# Patient Record
Sex: MALE | Race: OTHER | NOT HISPANIC OR LATINO | URBAN - METROPOLITAN AREA
[De-identification: names, ages, dates, MRNs, and addresses within clinical notes are randomized per-mention and may not be internally consistent; named-entity substitution may affect disease eponyms.]

---

## 2017-08-23 ENCOUNTER — EMERGENCY (EMERGENCY)
Facility: HOSPITAL | Age: 32
LOS: 1 days | Discharge: HOME | End: 2017-08-23
Attending: EMERGENCY MEDICINE
Payer: MEDICAID

## 2017-08-23 VITALS
HEART RATE: 80 BPM | HEIGHT: 68 IN | TEMPERATURE: 99 F | OXYGEN SATURATION: 99 % | RESPIRATION RATE: 18 BRPM | WEIGHT: 184.97 LBS | SYSTOLIC BLOOD PRESSURE: 113 MMHG | DIASTOLIC BLOOD PRESSURE: 69 MMHG

## 2017-08-23 LAB — ETHANOL SERPL-MCNC: 88 MG/DL — SIGNIFICANT CHANGE UP

## 2017-08-23 PROCEDURE — 80307 DRUG TEST PRSMV CHEM ANLYZR: CPT

## 2017-08-23 PROCEDURE — 73130 X-RAY EXAM OF HAND: CPT | Mod: 26,50

## 2017-08-23 PROCEDURE — 99283 EMERGENCY DEPT VISIT LOW MDM: CPT

## 2017-08-23 PROCEDURE — 99285 EMERGENCY DEPT VISIT HI MDM: CPT | Mod: 25

## 2017-08-23 PROCEDURE — 36415 COLL VENOUS BLD VENIPUNCTURE: CPT

## 2017-08-23 PROCEDURE — 73130 X-RAY EXAM OF HAND: CPT

## 2017-08-23 NOTE — SBIRT NOTE. - NSSBIRTSERVICES_GEN_A_ED_FT
Provided SBIRT services: Full screen positive. Referral to Treatment Performed. Screening results were reviewed with the patient and patient was provided information about healthy guidelines and potential negative consequences associated with level of risk. Motivation and readiness to reduce or stop use was discussed and goals and activities to make changes were suggested/offered.  Referral for complete assessment and level of care determination at a certified treatment facility was completed by contacting the treatment facility via phone, and add apt info as noted below:  Appt confirmed at: [facility]- 8/25/17 9:30am  Audit Score: 16  DAST Score: 3  Duration = 60 Minutes

## 2017-08-23 NOTE — ED PROVIDER NOTE - PROGRESS NOTE DETAILS
pt is walking and talking and understanding normally and is safe for discharge at this time will rec f/u pmd and d/c as per sign out and reeval
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
## 2018-02-01 ENCOUNTER — OUTPATIENT (OUTPATIENT)
Dept: OUTPATIENT SERVICES | Facility: HOSPITAL | Age: 33
LOS: 1 days | End: 2018-02-01
Payer: MEDICAID

## 2018-02-01 PROCEDURE — G9001: CPT

## 2018-02-10 ENCOUNTER — EMERGENCY (EMERGENCY)
Facility: HOSPITAL | Age: 33
LOS: 1 days | Discharge: HOME | End: 2018-02-10
Attending: EMERGENCY MEDICINE
Payer: MEDICAID

## 2018-02-10 VITALS
HEART RATE: 86 BPM | HEIGHT: 69 IN | DIASTOLIC BLOOD PRESSURE: 68 MMHG | RESPIRATION RATE: 22 BRPM | SYSTOLIC BLOOD PRESSURE: 113 MMHG | WEIGHT: 210.1 LBS | OXYGEN SATURATION: 98 % | TEMPERATURE: 98 F

## 2018-02-10 VITALS
OXYGEN SATURATION: 100 % | HEART RATE: 88 BPM | SYSTOLIC BLOOD PRESSURE: 134 MMHG | TEMPERATURE: 99 F | RESPIRATION RATE: 20 BRPM | DIASTOLIC BLOOD PRESSURE: 88 MMHG

## 2018-02-10 DIAGNOSIS — F10.10 ALCOHOL ABUSE, UNCOMPLICATED: ICD-10-CM

## 2018-02-10 DIAGNOSIS — F43.10 POST-TRAUMATIC STRESS DISORDER, UNSPECIFIED: ICD-10-CM

## 2018-02-10 DIAGNOSIS — F12.10 CANNABIS ABUSE, UNCOMPLICATED: ICD-10-CM

## 2018-02-10 DIAGNOSIS — F31.70 BIPOLAR DISORDER, CURRENTLY IN REMISSION, MOST RECENT EPISODE UNSPECIFIED: ICD-10-CM

## 2018-02-10 LAB
ALBUMIN SERPL ELPH-MCNC: 4.6 G/DL — SIGNIFICANT CHANGE UP (ref 3.3–5.2)
ALP SERPL-CCNC: 78 U/L — SIGNIFICANT CHANGE UP (ref 40–120)
ALT FLD-CCNC: 22 U/L — SIGNIFICANT CHANGE UP
ANION GAP SERPL CALC-SCNC: 18 MMOL/L — HIGH (ref 5–17)
AST SERPL-CCNC: 23 U/L — SIGNIFICANT CHANGE UP
BASOPHILS # BLD AUTO: 0 K/UL — SIGNIFICANT CHANGE UP (ref 0–0.2)
BASOPHILS NFR BLD AUTO: 0.2 % — SIGNIFICANT CHANGE UP (ref 0–2)
BILIRUB SERPL-MCNC: 0.5 MG/DL — SIGNIFICANT CHANGE UP (ref 0.4–2)
BUN SERPL-MCNC: 12 MG/DL — SIGNIFICANT CHANGE UP (ref 8–20)
CALCIUM SERPL-MCNC: 8.9 MG/DL — SIGNIFICANT CHANGE UP (ref 8.6–10.2)
CHLORIDE SERPL-SCNC: 105 MMOL/L — SIGNIFICANT CHANGE UP (ref 98–107)
CO2 SERPL-SCNC: 21 MMOL/L — LOW (ref 22–29)
CREAT SERPL-MCNC: 0.73 MG/DL — SIGNIFICANT CHANGE UP (ref 0.5–1.3)
EOSINOPHIL # BLD AUTO: 0.1 K/UL — SIGNIFICANT CHANGE UP (ref 0–0.5)
EOSINOPHIL NFR BLD AUTO: 1.6 % — SIGNIFICANT CHANGE UP (ref 0–5)
ETHANOL SERPL-MCNC: 245 MG/DL — SIGNIFICANT CHANGE UP
GLUCOSE SERPL-MCNC: 122 MG/DL — HIGH (ref 70–115)
HCT VFR BLD CALC: 44.8 % — SIGNIFICANT CHANGE UP (ref 42–52)
HGB BLD-MCNC: 15.8 G/DL — SIGNIFICANT CHANGE UP (ref 14–18)
LYMPHOCYTES # BLD AUTO: 3.1 K/UL — SIGNIFICANT CHANGE UP (ref 1–4.8)
LYMPHOCYTES # BLD AUTO: 51.1 % — SIGNIFICANT CHANGE UP (ref 20–55)
MCHC RBC-ENTMCNC: 29.3 PG — SIGNIFICANT CHANGE UP (ref 27–31)
MCHC RBC-ENTMCNC: 35.3 G/DL — SIGNIFICANT CHANGE UP (ref 32–36)
MCV RBC AUTO: 83 FL — SIGNIFICANT CHANGE UP (ref 80–94)
MONOCYTES # BLD AUTO: 0.3 K/UL — SIGNIFICANT CHANGE UP (ref 0–0.8)
MONOCYTES NFR BLD AUTO: 5.2 % — SIGNIFICANT CHANGE UP (ref 3–10)
NEUTROPHILS # BLD AUTO: 2.5 K/UL — SIGNIFICANT CHANGE UP (ref 1.8–8)
NEUTROPHILS NFR BLD AUTO: 41.7 % — SIGNIFICANT CHANGE UP (ref 37–73)
PLATELET # BLD AUTO: 252 K/UL — SIGNIFICANT CHANGE UP (ref 150–400)
POTASSIUM SERPL-MCNC: 3.8 MMOL/L — SIGNIFICANT CHANGE UP (ref 3.5–5.3)
POTASSIUM SERPL-SCNC: 3.8 MMOL/L — SIGNIFICANT CHANGE UP (ref 3.5–5.3)
PROT SERPL-MCNC: 7.7 G/DL — SIGNIFICANT CHANGE UP (ref 6.6–8.7)
RBC # BLD: 5.4 M/UL — SIGNIFICANT CHANGE UP (ref 4.6–6.2)
RBC # FLD: 12.9 % — SIGNIFICANT CHANGE UP (ref 11–15.6)
SODIUM SERPL-SCNC: 144 MMOL/L — SIGNIFICANT CHANGE UP (ref 135–145)
WBC # BLD: 6.1 K/UL — SIGNIFICANT CHANGE UP (ref 4.8–10.8)
WBC # FLD AUTO: 6.1 K/UL — SIGNIFICANT CHANGE UP (ref 4.8–10.8)

## 2018-02-10 PROCEDURE — 96375 TX/PRO/DX INJ NEW DRUG ADDON: CPT | Mod: XU

## 2018-02-10 PROCEDURE — 70486 CT MAXILLOFACIAL W/O DYE: CPT

## 2018-02-10 PROCEDURE — 99285 EMERGENCY DEPT VISIT HI MDM: CPT | Mod: 25

## 2018-02-10 PROCEDURE — 85027 COMPLETE CBC AUTOMATED: CPT

## 2018-02-10 PROCEDURE — 96374 THER/PROPH/DIAG INJ IV PUSH: CPT | Mod: XU

## 2018-02-10 PROCEDURE — 80307 DRUG TEST PRSMV CHEM ANLYZR: CPT

## 2018-02-10 PROCEDURE — 80053 COMPREHEN METABOLIC PANEL: CPT

## 2018-02-10 PROCEDURE — 70486 CT MAXILLOFACIAL W/O DYE: CPT | Mod: 26

## 2018-02-10 PROCEDURE — 12011 RPR F/E/E/N/L/M 2.5 CM/<: CPT

## 2018-02-10 PROCEDURE — 70450 CT HEAD/BRAIN W/O DYE: CPT | Mod: 26

## 2018-02-10 PROCEDURE — 72125 CT NECK SPINE W/O DYE: CPT

## 2018-02-10 PROCEDURE — 72125 CT NECK SPINE W/O DYE: CPT | Mod: 26

## 2018-02-10 PROCEDURE — 36415 COLL VENOUS BLD VENIPUNCTURE: CPT

## 2018-02-10 PROCEDURE — 90792 PSYCH DIAG EVAL W/MED SRVCS: CPT

## 2018-02-10 PROCEDURE — 70450 CT HEAD/BRAIN W/O DYE: CPT

## 2018-02-10 RX ORDER — CEPHALEXIN 500 MG
1 CAPSULE ORAL
Qty: 14 | Refills: 0 | OUTPATIENT
Start: 2018-02-10 | End: 2018-02-16

## 2018-02-10 RX ORDER — CEFAZOLIN SODIUM 1 G
1000 VIAL (EA) INJECTION ONCE
Qty: 0 | Refills: 0 | Status: COMPLETED | OUTPATIENT
Start: 2018-02-10 | End: 2018-02-10

## 2018-02-10 RX ORDER — SODIUM CHLORIDE 9 MG/ML
3 INJECTION INTRAMUSCULAR; INTRAVENOUS; SUBCUTANEOUS ONCE
Qty: 0 | Refills: 0 | Status: COMPLETED | OUTPATIENT
Start: 2018-02-10 | End: 2018-02-10

## 2018-02-10 RX ORDER — HALOPERIDOL DECANOATE 100 MG/ML
5 INJECTION INTRAMUSCULAR ONCE
Qty: 0 | Refills: 0 | Status: COMPLETED | OUTPATIENT
Start: 2018-02-10 | End: 2018-02-10

## 2018-02-10 RX ORDER — DIPHENHYDRAMINE HCL 50 MG
25 CAPSULE ORAL ONCE
Qty: 0 | Refills: 0 | Status: COMPLETED | OUTPATIENT
Start: 2018-02-10 | End: 2018-02-10

## 2018-02-10 RX ADMIN — HALOPERIDOL DECANOATE 5 MILLIGRAM(S): 100 INJECTION INTRAMUSCULAR at 03:15

## 2018-02-10 RX ADMIN — Medication 2 MILLIGRAM(S): at 03:15

## 2018-02-10 RX ADMIN — Medication 25 MILLIGRAM(S): at 03:15

## 2018-02-10 RX ADMIN — Medication 100 MILLIGRAM(S): at 06:26

## 2018-02-10 RX ADMIN — SODIUM CHLORIDE 3 MILLILITER(S): 9 INJECTION INTRAMUSCULAR; INTRAVENOUS; SUBCUTANEOUS at 03:15

## 2018-02-10 NOTE — ED ADULT NURSE REASSESSMENT NOTE - NS ED NURSE REASSESS COMMENT FT1
Assumed care of patient at this time, patient is resting in bed at this time, patient denies any pain. NAD noted. Patient is calm and cooperative. Will continue to monitor. Patient given warm blankets
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
## 2018-02-10 NOTE — ED PROVIDER NOTE - PROGRESS NOTE DETAILS
Lac repaired. Pt reports up to date tetanus. Lac repaired. Pt reports up to date tetanus. Pt now clinically sober;  laceration repaired by me at patient's request.  Pt given suture care instructions and I reviewed patient's results with patient and allowed the opportunity for questions.  I provided the patient with anticipatory guidance, advised followup with PCP, and gave return precautions. Patient reported that they were feeling well for discharge and expressed understanding of instructions.   PSych called for clearance. Pt cleared by psychiatry. Denies SI/ HI. Pending SW for appointment with [facility].
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
## 2018-02-10 NOTE — ED BEHAVIORAL HEALTH ASSESSMENT NOTE - RISK ASSESSMENT
Pt has suicidal and legal hx, he is using substances, acts impulsively and recklessly, but has no intent or plan to harm self and others. he is future oriented and wants to get help.  PT is not at imminent risk to harm self  and others, His long term risk is high and he needs treatment in community.

## 2018-02-10 NOTE — ED BEHAVIORAL HEALTH ASSESSMENT NOTE - SUICIDE RISK FACTORS
Highly impulsive behavior/Substance abuse/dependence/Access to means (pills, firearms, etc.)/History of abuse/trauma/Mood episode

## 2018-02-14 DIAGNOSIS — R69 ILLNESS, UNSPECIFIED: ICD-10-CM
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
## 2019-02-24 ENCOUNTER — EMERGENCY (EMERGENCY)
Facility: HOSPITAL | Age: 34
LOS: 1 days | Discharge: HOME | End: 2019-02-24
Attending: STUDENT IN AN ORGANIZED HEALTH CARE EDUCATION/TRAINING PROGRAM
Payer: MEDICAID

## 2019-02-24 VITALS
RESPIRATION RATE: 18 BRPM | TEMPERATURE: 98 F | HEART RATE: 78 BPM | SYSTOLIC BLOOD PRESSURE: 130 MMHG | DIASTOLIC BLOOD PRESSURE: 72 MMHG | OXYGEN SATURATION: 97 %

## 2019-02-24 LAB
ALBUMIN SERPL ELPH-MCNC: 4.4 G/DL — SIGNIFICANT CHANGE UP (ref 3.3–5.2)
ALP SERPL-CCNC: 61 U/L — SIGNIFICANT CHANGE UP (ref 40–120)
ALT FLD-CCNC: 20 U/L — SIGNIFICANT CHANGE UP
ANION GAP SERPL CALC-SCNC: 13 MMOL/L — SIGNIFICANT CHANGE UP (ref 5–17)
AST SERPL-CCNC: 19 U/L — SIGNIFICANT CHANGE UP
BASOPHILS # BLD AUTO: 0 K/UL — SIGNIFICANT CHANGE UP (ref 0–0.2)
BASOPHILS NFR BLD AUTO: 0.2 % — SIGNIFICANT CHANGE UP (ref 0–2)
BILIRUB SERPL-MCNC: 0.3 MG/DL — LOW (ref 0.4–2)
BUN SERPL-MCNC: 13 MG/DL — SIGNIFICANT CHANGE UP (ref 8–20)
CALCIUM SERPL-MCNC: 8.9 MG/DL — SIGNIFICANT CHANGE UP (ref 8.6–10.2)
CHLORIDE SERPL-SCNC: 110 MMOL/L — HIGH (ref 98–107)
CO2 SERPL-SCNC: 21 MMOL/L — LOW (ref 22–29)
CREAT SERPL-MCNC: 0.63 MG/DL — SIGNIFICANT CHANGE UP (ref 0.5–1.3)
EOSINOPHIL # BLD AUTO: 0.1 K/UL — SIGNIFICANT CHANGE UP (ref 0–0.5)
EOSINOPHIL NFR BLD AUTO: 2 % — SIGNIFICANT CHANGE UP (ref 0–5)
ETHANOL SERPL-MCNC: 229 MG/DL — SIGNIFICANT CHANGE UP
GLUCOSE SERPL-MCNC: 102 MG/DL — SIGNIFICANT CHANGE UP (ref 70–115)
HCT VFR BLD CALC: 41.1 % — LOW (ref 42–52)
HGB BLD-MCNC: 14.3 G/DL — SIGNIFICANT CHANGE UP (ref 14–18)
LYMPHOCYTES # BLD AUTO: 1.6 K/UL — SIGNIFICANT CHANGE UP (ref 1–4.8)
LYMPHOCYTES # BLD AUTO: 31.6 % — SIGNIFICANT CHANGE UP (ref 20–55)
MCHC RBC-ENTMCNC: 29.4 PG — SIGNIFICANT CHANGE UP (ref 27–31)
MCHC RBC-ENTMCNC: 34.8 G/DL — SIGNIFICANT CHANGE UP (ref 32–36)
MCV RBC AUTO: 84.6 FL — SIGNIFICANT CHANGE UP (ref 80–94)
MONOCYTES # BLD AUTO: 0.2 K/UL — SIGNIFICANT CHANGE UP (ref 0–0.8)
MONOCYTES NFR BLD AUTO: 3.3 % — SIGNIFICANT CHANGE UP (ref 3–10)
NEUTROPHILS # BLD AUTO: 3.1 K/UL — SIGNIFICANT CHANGE UP (ref 1.8–8)
NEUTROPHILS NFR BLD AUTO: 62.7 % — SIGNIFICANT CHANGE UP (ref 37–73)
PLATELET # BLD AUTO: 225 K/UL — SIGNIFICANT CHANGE UP (ref 150–400)
POTASSIUM SERPL-MCNC: 3.7 MMOL/L — SIGNIFICANT CHANGE UP (ref 3.5–5.3)
POTASSIUM SERPL-SCNC: 3.7 MMOL/L — SIGNIFICANT CHANGE UP (ref 3.5–5.3)
PROT SERPL-MCNC: 7.3 G/DL — SIGNIFICANT CHANGE UP (ref 6.6–8.7)
RBC # BLD: 4.86 M/UL — SIGNIFICANT CHANGE UP (ref 4.6–6.2)
RBC # FLD: 12.8 % — SIGNIFICANT CHANGE UP (ref 11–15.6)
SODIUM SERPL-SCNC: 144 MMOL/L — SIGNIFICANT CHANGE UP (ref 135–145)
WBC # BLD: 4.9 K/UL — SIGNIFICANT CHANGE UP (ref 4.8–10.8)
WBC # FLD AUTO: 4.9 K/UL — SIGNIFICANT CHANGE UP (ref 4.8–10.8)

## 2019-02-24 PROCEDURE — 99284 EMERGENCY DEPT VISIT MOD MDM: CPT

## 2019-02-24 PROCEDURE — 36415 COLL VENOUS BLD VENIPUNCTURE: CPT

## 2019-02-24 PROCEDURE — 99285 EMERGENCY DEPT VISIT HI MDM: CPT | Mod: 25

## 2019-02-24 PROCEDURE — 80053 COMPREHEN METABOLIC PANEL: CPT

## 2019-02-24 PROCEDURE — 96372 THER/PROPH/DIAG INJ SC/IM: CPT

## 2019-02-24 PROCEDURE — 85027 COMPLETE CBC AUTOMATED: CPT

## 2019-02-24 PROCEDURE — 80307 DRUG TEST PRSMV CHEM ANLYZR: CPT

## 2019-02-24 RX ADMIN — Medication 2 MILLIGRAM(S): at 19:49

## 2019-02-24 NOTE — ED ADULT NURSE NOTE - NSIMPLEMENTINTERV_GEN_ALL_ED
Implemented All Fall with Harm Risk Interventions:  [place] to call system. Call bell, personal items and telephone within reach. Instruct patient to call for assistance. Room bathroom lighting operational. Non-slip footwear when patient is off stretcher. Physically safe environment: no spills, clutter or unnecessary equipment. Stretcher in lowest position, wheels locked, appropriate side rails in place. Provide visual cue, wrist band, yellow gown, etc. Monitor gait and stability. Monitor for mental status changes and reorient to person, place, and time. Review medications for side effects contributing to fall risk. Reinforce activity limits and safety measures with patient and family. Provide visual clues: red socks.

## 2019-02-24 NOTE — ED PROVIDER NOTE - CLINICAL SUMMARY MEDICAL DECISION MAKING FREE TEXT BOX
Pt brought in for alcohol intoxication and agitation.  will check labs and give ativan and watch until sober. Pt brought in for alcohol intoxication and agitation.  will check labs and give ativan and watch until sober.  Pt clinically sober and ambulating in ED without difficulty and acting appropriately. will d/c to home with instructions to f/up with pcp in 1-2 days. instructed to return for any new/concerning symptoms.
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
## 2019-02-24 NOTE — ED PROVIDER NOTE - PROGRESS NOTE DETAILS
labs reviewed. Pt states that he still feels foggy from the ativan. Pt signed out to DR. Palma pending sobriety and discharge.
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
## 2019-02-25 VITALS
RESPIRATION RATE: 19 BRPM | DIASTOLIC BLOOD PRESSURE: 66 MMHG | SYSTOLIC BLOOD PRESSURE: 135 MMHG | HEART RATE: 74 BPM | OXYGEN SATURATION: 98 % | TEMPERATURE: 98 F

## 2019-02-25 PROBLEM — F41.9 ANXIETY DISORDER, UNSPECIFIED: Chronic | Status: ACTIVE | Noted: 2017-08-23

## 2019-02-25 PROBLEM — F43.10 POST-TRAUMATIC STRESS DISORDER, UNSPECIFIED: Chronic | Status: ACTIVE | Noted: 2017-08-23

## 2019-03-01 ENCOUNTER — OUTPATIENT (OUTPATIENT)
Dept: OUTPATIENT SERVICES | Facility: HOSPITAL | Age: 34
LOS: 1 days | End: 2019-03-01
Payer: MEDICAID

## 2019-03-01 PROCEDURE — G9001: CPT

## 2019-03-04 DIAGNOSIS — Z71.89 OTHER SPECIFIED COUNSELING: ICD-10-CM

## 2019-03-06 ENCOUNTER — INPATIENT (INPATIENT)
Facility: HOSPITAL | Age: 34
LOS: 5 days | Discharge: PSYCHIATRIC OR SUBSTANCE USE FACILITY | DRG: 580 | End: 2019-03-12
Attending: SURGERY | Admitting: SURGERY
Payer: MEDICAID

## 2019-03-06 VITALS
HEART RATE: 96 BPM | SYSTOLIC BLOOD PRESSURE: 119 MMHG | RESPIRATION RATE: 17 BRPM | OXYGEN SATURATION: 99 % | DIASTOLIC BLOOD PRESSURE: 89 MMHG

## 2019-03-06 DIAGNOSIS — S11.91XA LACERATION WITHOUT FOREIGN BODY OF UNSPECIFIED PART OF NECK, INITIAL ENCOUNTER: ICD-10-CM

## 2019-03-06 LAB
ALBUMIN SERPL ELPH-MCNC: 4.9 G/DL — SIGNIFICANT CHANGE UP (ref 3.3–5.2)
ALP SERPL-CCNC: 82 U/L — SIGNIFICANT CHANGE UP (ref 40–120)
ALT FLD-CCNC: 33 U/L — SIGNIFICANT CHANGE UP
AMPHET UR-MCNC: NEGATIVE — SIGNIFICANT CHANGE UP
ANION GAP SERPL CALC-SCNC: 15 MMOL/L — SIGNIFICANT CHANGE UP (ref 5–17)
APTT BLD: 31.4 SEC — SIGNIFICANT CHANGE UP (ref 27.5–36.3)
AST SERPL-CCNC: 42 U/L — HIGH
BARBITURATES UR SCN-MCNC: NEGATIVE — SIGNIFICANT CHANGE UP
BASE EXCESS BLDV CALC-SCNC: 0.7 MMOL/L — SIGNIFICANT CHANGE UP (ref -2–2)
BASOPHILS # BLD AUTO: 0 K/UL — SIGNIFICANT CHANGE UP (ref 0–0.2)
BASOPHILS NFR BLD AUTO: 0.2 % — SIGNIFICANT CHANGE UP (ref 0–2)
BENZODIAZ UR-MCNC: NEGATIVE — SIGNIFICANT CHANGE UP
BILIRUB SERPL-MCNC: 0.4 MG/DL — SIGNIFICANT CHANGE UP (ref 0.4–2)
BLD GP AB SCN SERPL QL: SIGNIFICANT CHANGE UP
BUN SERPL-MCNC: 13 MG/DL — SIGNIFICANT CHANGE UP (ref 8–20)
CA-I SERPL-SCNC: 1.06 MMOL/L — LOW (ref 1.15–1.33)
CALCIUM SERPL-MCNC: 8.9 MG/DL — SIGNIFICANT CHANGE UP (ref 8.6–10.2)
CHLORIDE BLDV-SCNC: 110 MMOL/L — HIGH (ref 98–107)
CHLORIDE SERPL-SCNC: 106 MMOL/L — SIGNIFICANT CHANGE UP (ref 98–107)
CO2 SERPL-SCNC: 23 MMOL/L — SIGNIFICANT CHANGE UP (ref 22–29)
COCAINE METAB.OTHER UR-MCNC: NEGATIVE — SIGNIFICANT CHANGE UP
CREAT SERPL-MCNC: 0.78 MG/DL — SIGNIFICANT CHANGE UP (ref 0.5–1.3)
EOSINOPHIL # BLD AUTO: 0.2 K/UL — SIGNIFICANT CHANGE UP (ref 0–0.5)
EOSINOPHIL NFR BLD AUTO: 2.7 % — SIGNIFICANT CHANGE UP (ref 0–5)
ETHANOL SERPL-MCNC: 348 MG/DL — SIGNIFICANT CHANGE UP
GAS PNL BLDV: 149 MMOL/L — HIGH (ref 135–145)
GAS PNL BLDV: SIGNIFICANT CHANGE UP
GAS PNL BLDV: SIGNIFICANT CHANGE UP
GLUCOSE BLDV-MCNC: 128 MG/DL — HIGH (ref 70–99)
GLUCOSE SERPL-MCNC: 137 MG/DL — HIGH (ref 70–115)
HCO3 BLDV-SCNC: 25 MMOL/L — SIGNIFICANT CHANGE UP (ref 20–26)
HCT VFR BLD CALC: 44.2 % — SIGNIFICANT CHANGE UP (ref 42–52)
HCT VFR BLDA CALC: 49 — SIGNIFICANT CHANGE UP (ref 39–50)
HGB BLD CALC-MCNC: 16.1 G/DL — SIGNIFICANT CHANGE UP (ref 13–17)
HGB BLD-MCNC: 15.6 G/DL — SIGNIFICANT CHANGE UP (ref 14–18)
INR BLD: 0.96 RATIO — SIGNIFICANT CHANGE UP (ref 0.88–1.16)
LACTATE BLDV-MCNC: 2.3 MMOL/L — HIGH (ref 0.5–2)
LIDOCAIN IGE QN: 85 U/L — HIGH (ref 22–51)
LYMPHOCYTES # BLD AUTO: 2.3 K/UL — SIGNIFICANT CHANGE UP (ref 1–4.8)
LYMPHOCYTES # BLD AUTO: 38.8 % — SIGNIFICANT CHANGE UP (ref 20–55)
MCHC RBC-ENTMCNC: 29.9 PG — SIGNIFICANT CHANGE UP (ref 27–31)
MCHC RBC-ENTMCNC: 35.3 G/DL — SIGNIFICANT CHANGE UP (ref 32–36)
MCV RBC AUTO: 84.7 FL — SIGNIFICANT CHANGE UP (ref 80–94)
METHADONE UR-MCNC: NEGATIVE — SIGNIFICANT CHANGE UP
MONOCYTES # BLD AUTO: 0.3 K/UL — SIGNIFICANT CHANGE UP (ref 0–0.8)
MONOCYTES NFR BLD AUTO: 4.4 % — SIGNIFICANT CHANGE UP (ref 3–10)
NEUTROPHILS # BLD AUTO: 3.1 K/UL — SIGNIFICANT CHANGE UP (ref 1.8–8)
NEUTROPHILS NFR BLD AUTO: 53.4 % — SIGNIFICANT CHANGE UP (ref 37–73)
OPIATES UR-MCNC: NEGATIVE — SIGNIFICANT CHANGE UP
OTHER CELLS CSF MANUAL: 21 ML/DL — SIGNIFICANT CHANGE UP (ref 18–22)
PCO2 BLDV: 46 MMHG — SIGNIFICANT CHANGE UP (ref 35–50)
PCP SPEC-MCNC: SIGNIFICANT CHANGE UP
PCP UR-MCNC: NEGATIVE — SIGNIFICANT CHANGE UP
PH BLDV: 7.37 — SIGNIFICANT CHANGE UP (ref 7.32–7.43)
PLATELET # BLD AUTO: 245 K/UL — SIGNIFICANT CHANGE UP (ref 150–400)
PO2 BLDV: 84 MMHG — HIGH (ref 25–45)
POTASSIUM BLDV-SCNC: 3.3 MMOL/L — LOW (ref 3.4–4.5)
POTASSIUM SERPL-MCNC: 3.6 MMOL/L — SIGNIFICANT CHANGE UP (ref 3.5–5.3)
POTASSIUM SERPL-SCNC: 3.6 MMOL/L — SIGNIFICANT CHANGE UP (ref 3.5–5.3)
PROT SERPL-MCNC: 7.5 G/DL — SIGNIFICANT CHANGE UP (ref 6.6–8.7)
PROTHROM AB SERPL-ACNC: 11 SEC — SIGNIFICANT CHANGE UP (ref 10–12.9)
RBC # BLD: 5.22 M/UL — SIGNIFICANT CHANGE UP (ref 4.6–6.2)
RBC # FLD: 13.1 % — SIGNIFICANT CHANGE UP (ref 11–15.6)
SAO2 % BLDV: 95 % — SIGNIFICANT CHANGE UP
SODIUM SERPL-SCNC: 144 MMOL/L — SIGNIFICANT CHANGE UP (ref 135–145)
THC UR QL: POSITIVE
WBC # BLD: 5.9 K/UL — SIGNIFICANT CHANGE UP (ref 4.8–10.8)
WBC # FLD AUTO: 5.9 K/UL — SIGNIFICANT CHANGE UP (ref 4.8–10.8)

## 2019-03-06 PROCEDURE — 70450 CT HEAD/BRAIN W/O DYE: CPT | Mod: 26

## 2019-03-06 PROCEDURE — 99291 CRITICAL CARE FIRST HOUR: CPT

## 2019-03-06 PROCEDURE — 70498 CT ANGIOGRAPHY NECK: CPT | Mod: 26

## 2019-03-06 PROCEDURE — 71045 X-RAY EXAM CHEST 1 VIEW: CPT | Mod: 26

## 2019-03-06 RX ORDER — CHLORHEXIDINE GLUCONATE 213 G/1000ML
1 SOLUTION TOPICAL DAILY
Qty: 0 | Refills: 0 | Status: DISCONTINUED | OUTPATIENT
Start: 2019-03-06 | End: 2019-03-08

## 2019-03-06 RX ORDER — FENTANYL CITRATE 50 UG/ML
100 INJECTION INTRAVENOUS ONCE
Qty: 0 | Refills: 0 | Status: DISCONTINUED | OUTPATIENT
Start: 2019-03-06 | End: 2019-03-06

## 2019-03-06 RX ORDER — TETANUS TOXOID, REDUCED DIPHTHERIA TOXOID AND ACELLULAR PERTUSSIS VACCINE, ADSORBED 5; 2.5; 8; 8; 2.5 [IU]/.5ML; [IU]/.5ML; UG/.5ML; UG/.5ML; UG/.5ML
0.5 SUSPENSION INTRAMUSCULAR ONCE
Qty: 0 | Refills: 0 | Status: COMPLETED | OUTPATIENT
Start: 2019-03-06 | End: 2019-03-06

## 2019-03-06 RX ORDER — SODIUM CHLORIDE 9 MG/ML
1000 INJECTION, SOLUTION INTRAVENOUS
Qty: 0 | Refills: 0 | Status: DISCONTINUED | OUTPATIENT
Start: 2019-03-06 | End: 2019-03-07

## 2019-03-06 RX ORDER — CHLORHEXIDINE GLUCONATE 213 G/1000ML
15 SOLUTION TOPICAL
Qty: 0 | Refills: 0 | Status: DISCONTINUED | OUTPATIENT
Start: 2019-03-06 | End: 2019-03-07

## 2019-03-06 RX ORDER — HYDROMORPHONE HYDROCHLORIDE 2 MG/ML
1 INJECTION INTRAMUSCULAR; INTRAVENOUS; SUBCUTANEOUS ONCE
Qty: 0 | Refills: 0 | Status: DISCONTINUED | OUTPATIENT
Start: 2019-03-06 | End: 2019-03-06

## 2019-03-06 RX ORDER — HYDROMORPHONE HYDROCHLORIDE 2 MG/ML
2 INJECTION INTRAMUSCULAR; INTRAVENOUS; SUBCUTANEOUS ONCE
Qty: 0 | Refills: 0 | Status: DISCONTINUED | OUTPATIENT
Start: 2019-03-06 | End: 2019-03-06

## 2019-03-06 RX ORDER — TETANUS AND DIPHTHERIA TOXOIDS ADSORBED 2; 2 [LF]/.5ML; [LF]/.5ML
0.5 INJECTION INTRAMUSCULAR ONCE
Qty: 0 | Refills: 0 | Status: DISCONTINUED | OUTPATIENT
Start: 2019-03-06 | End: 2019-03-06

## 2019-03-06 RX ORDER — ETOMIDATE 2 MG/ML
20 INJECTION INTRAVENOUS ONCE
Qty: 0 | Refills: 0 | Status: COMPLETED | OUTPATIENT
Start: 2019-03-06 | End: 2019-03-06

## 2019-03-06 RX ORDER — ACETAMINOPHEN 500 MG
650 TABLET ORAL EVERY 6 HOURS
Qty: 0 | Refills: 0 | Status: DISCONTINUED | OUTPATIENT
Start: 2019-03-06 | End: 2019-03-12

## 2019-03-06 RX ORDER — SUCCINYLCHOLINE CHLORIDE 100 MG/5ML
100 SYRINGE (ML) INTRAVENOUS ONCE
Qty: 0 | Refills: 0 | Status: COMPLETED | OUTPATIENT
Start: 2019-03-06 | End: 2019-03-06

## 2019-03-06 RX ORDER — HYDROMORPHONE HYDROCHLORIDE 2 MG/ML
1 INJECTION INTRAMUSCULAR; INTRAVENOUS; SUBCUTANEOUS
Qty: 0 | Refills: 0 | Status: DISCONTINUED | OUTPATIENT
Start: 2019-03-06 | End: 2019-03-08

## 2019-03-06 RX ORDER — DEXAMETHASONE 0.5 MG/5ML
10 ELIXIR ORAL ONCE
Qty: 0 | Refills: 0 | Status: COMPLETED | OUTPATIENT
Start: 2019-03-06 | End: 2019-03-06

## 2019-03-06 RX ORDER — SODIUM CHLORIDE 9 MG/ML
1000 INJECTION, SOLUTION INTRAVENOUS ONCE
Qty: 0 | Refills: 0 | Status: COMPLETED | OUTPATIENT
Start: 2019-03-06 | End: 2019-03-06

## 2019-03-06 RX ORDER — SODIUM CHLORIDE 9 MG/ML
1000 INJECTION INTRAMUSCULAR; INTRAVENOUS; SUBCUTANEOUS ONCE
Qty: 0 | Refills: 0 | Status: COMPLETED | OUTPATIENT
Start: 2019-03-06 | End: 2019-03-06

## 2019-03-06 RX ORDER — THIAMINE MONONITRATE (VIT B1) 100 MG
500 TABLET ORAL DAILY
Qty: 0 | Refills: 0 | Status: DISCONTINUED | OUTPATIENT
Start: 2019-03-06 | End: 2019-03-08

## 2019-03-06 RX ORDER — PROPOFOL 10 MG/ML
20 INJECTION, EMULSION INTRAVENOUS
Qty: 1000 | Refills: 0 | Status: DISCONTINUED | OUTPATIENT
Start: 2019-03-06 | End: 2019-03-07

## 2019-03-06 RX ORDER — PANTOPRAZOLE SODIUM 20 MG/1
40 TABLET, DELAYED RELEASE ORAL DAILY
Qty: 0 | Refills: 0 | Status: DISCONTINUED | OUTPATIENT
Start: 2019-03-06 | End: 2019-03-10

## 2019-03-06 RX ORDER — ENOXAPARIN SODIUM 100 MG/ML
40 INJECTION SUBCUTANEOUS DAILY
Qty: 0 | Refills: 0 | Status: DISCONTINUED | OUTPATIENT
Start: 2019-03-07 | End: 2019-03-12

## 2019-03-06 RX ORDER — PROPOFOL 10 MG/ML
20 INJECTION, EMULSION INTRAVENOUS ONCE
Qty: 0 | Refills: 0 | Status: COMPLETED | OUTPATIENT
Start: 2019-03-06 | End: 2019-03-06

## 2019-03-06 RX ORDER — FOLIC ACID 0.8 MG
1 TABLET ORAL DAILY
Qty: 0 | Refills: 0 | Status: DISCONTINUED | OUTPATIENT
Start: 2019-03-06 | End: 2019-03-12

## 2019-03-06 RX ORDER — PROPOFOL 10 MG/ML
30 INJECTION, EMULSION INTRAVENOUS
Qty: 1000 | Refills: 0 | Status: DISCONTINUED | OUTPATIENT
Start: 2019-03-06 | End: 2019-03-06

## 2019-03-06 RX ADMIN — Medication 105 MILLIGRAM(S): at 18:40

## 2019-03-06 RX ADMIN — HYDROMORPHONE HYDROCHLORIDE 1 MILLIGRAM(S): 2 INJECTION INTRAMUSCULAR; INTRAVENOUS; SUBCUTANEOUS at 20:51

## 2019-03-06 RX ADMIN — SODIUM CHLORIDE 125 MILLILITER(S): 9 INJECTION, SOLUTION INTRAVENOUS at 18:40

## 2019-03-06 RX ADMIN — HYDROMORPHONE HYDROCHLORIDE 1 MILLIGRAM(S): 2 INJECTION INTRAMUSCULAR; INTRAVENOUS; SUBCUTANEOUS at 21:12

## 2019-03-06 RX ADMIN — PROPOFOL 10.8 MICROGRAM(S)/KG/MIN: 10 INJECTION, EMULSION INTRAVENOUS at 14:00

## 2019-03-06 RX ADMIN — FENTANYL CITRATE 100 MICROGRAM(S): 50 INJECTION INTRAVENOUS at 17:17

## 2019-03-06 RX ADMIN — SODIUM CHLORIDE 1000 MILLILITER(S): 9 INJECTION, SOLUTION INTRAVENOUS at 21:03

## 2019-03-06 RX ADMIN — TETANUS TOXOID, REDUCED DIPHTHERIA TOXOID AND ACELLULAR PERTUSSIS VACCINE, ADSORBED 0.5 MILLILITER(S): 5; 2.5; 8; 8; 2.5 SUSPENSION INTRAMUSCULAR at 16:10

## 2019-03-06 RX ADMIN — PROPOFOL 16.2 MICROGRAM(S)/KG/MIN: 10 INJECTION, EMULSION INTRAVENOUS at 12:00

## 2019-03-06 RX ADMIN — Medication 1 MILLIGRAM(S): at 16:59

## 2019-03-06 RX ADMIN — Medication 2 MILLIGRAM(S): at 17:30

## 2019-03-06 RX ADMIN — Medication 2 MILLIGRAM(S): at 22:49

## 2019-03-06 RX ADMIN — CHLORHEXIDINE GLUCONATE 15 MILLILITER(S): 213 SOLUTION TOPICAL at 17:04

## 2019-03-06 RX ADMIN — PROPOFOL 10.8 MICROGRAM(S)/KG/MIN: 10 INJECTION, EMULSION INTRAVENOUS at 16:24

## 2019-03-06 RX ADMIN — FENTANYL CITRATE 100 MICROGRAM(S): 50 INJECTION INTRAVENOUS at 17:02

## 2019-03-06 RX ADMIN — PANTOPRAZOLE SODIUM 40 MILLIGRAM(S): 20 TABLET, DELAYED RELEASE ORAL at 16:59

## 2019-03-06 RX ADMIN — HYDROMORPHONE HYDROCHLORIDE 2 MILLIGRAM(S): 2 INJECTION INTRAMUSCULAR; INTRAVENOUS; SUBCUTANEOUS at 12:41

## 2019-03-06 RX ADMIN — Medication 102 MILLIGRAM(S): at 20:25

## 2019-03-06 RX ADMIN — Medication 2 MILLIGRAM(S): at 19:47

## 2019-03-06 RX ADMIN — HYDROMORPHONE HYDROCHLORIDE 2 MILLIGRAM(S): 2 INJECTION INTRAMUSCULAR; INTRAVENOUS; SUBCUTANEOUS at 13:00

## 2019-03-06 RX ADMIN — CHLORHEXIDINE GLUCONATE 1 APPLICATION(S): 213 SOLUTION TOPICAL at 14:54

## 2019-03-06 RX ADMIN — PROPOFOL 10.8 MICROGRAM(S)/KG/MIN: 10 INJECTION, EMULSION INTRAVENOUS at 21:21

## 2019-03-06 RX ADMIN — SODIUM CHLORIDE 4000 MILLILITER(S): 9 INJECTION INTRAMUSCULAR; INTRAVENOUS; SUBCUTANEOUS at 12:00
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
## 2019-03-06 NOTE — PATIENT PROFILE ADULT - OVER THE PAST TWO WEEKS HAVE YOU FELT DOWN, DEPRESSED OR HOPELESS?
mother states patient fears people in general and does not trust anyone due to his pysch history ... mother stated that patient has been battling with depression for years since childhood/yes

## 2019-03-06 NOTE — ED PROVIDER NOTE - PHYSICAL EXAMINATION
Airway intact, slurring speech/hoarse voice, not answering questions appropriately   BS b/l   equal pulses all 4 extremities   GCS 14   moving all extremities, combative   +12cm laceration to left side of neck no active bleeding, no hematoma, no tracheal deviation, no bubbling, violates platysma   no trauma to chest/abdomen/back   no spinal step offs   no other wounds noted
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
## 2019-03-06 NOTE — H&P ADULT - NSHPREVIEWOFSYSTEMS_GEN_ALL_CORE
Unable to obtain. Patient combative and uncooperative.    He does deny taking any medications. Denies illicits.
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
## 2019-03-06 NOTE — H&P ADULT - NSHPLABSRESULTS_GEN_ALL_CORE
LABS:                        15.6   5.9   )-----------( 245      ( 06 Mar 2019 10:59 )             44.2     03-06    144  |  106  |  13.0  ----------------------------<  137<H>  3.6   |  23.0  |  0.78    Ca    8.9      06 Mar 2019 10:59    TPro  7.5  /  Alb  4.9  /  TBili  0.4  /  DBili  x   /  AST  42<H>  /  ALT  33  /  AlkPhos  82  03-06    PT/INR - ( 06 Mar 2019 10:59 )   PT: 11.0 sec;   INR: 0.96 ratio         PTT - ( 06 Mar 2019 10:59 )  PTT:31.4 sec

## 2019-03-06 NOTE — H&P ADULT - NSHPPHYSICALEXAM_GEN_ALL_CORE
INTERVAL HPI/OVERNIGHT EVENTS:    SUBJECTIVE:      MEDICATIONS  (STANDING):  etomidate Injectable 20 milliGRAM(s) IV Push Once  propofol Infusion 30 MICROgram(s)/kG/Min (16.2 mL/Hr) IV Continuous <Continuous>  propofol Injectable 20 milliGRAM(s) IV Push once  sodium chloride 0.9% Bolus 1000 milliLiter(s) IV Bolus once  succinylcholine Injectable 100 milliGRAM(s) IV Push Once    MEDICATIONS  (PRN):      Vital Signs Last 24 Hrs  T(C): --  T(F): --  HR: --  BP: --  BP(mean): --  RR: --  SpO2: --    PE  Gen: combative. intoxicated. intubated in trauma bay.   Pulm: CTAB. Symmetrical chest movements. No injuries to chest noted  CV: RRR nontachy.  Abd: Soft NTND. Pelvis stable  : Normal male ext genitalia. No blood on meatus  Ext: WWP all extremities. Superficial abrasions to both ant legs and L 2nd knuckle  Vasc: WWP 2+ pulses femoral and bilateral radial/DP/PTs  Neuro: Combative, belligerent. But nonfocal exam. He moves all 4x extremities on command. No facial droop. CN grossly intact. PERRLA Pupils 2-3mm equally bilaterally reactive. EOMI
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
## 2019-03-06 NOTE — ED PROVIDER NOTE - CLINICAL SUMMARY MEDICAL DECISION MAKING FREE TEXT BOX
patient with large laceration to neck zone 2/3, no hard signs, questionable change in speech related to wound vs intoxication. patient agitative and combative. intubated for medical management and possible tracheal injury. intubated without difficulty, no pooling/swelling/deviation in airway. patient sedated and paralyzed, to CTA and SICU

## 2019-03-07 DIAGNOSIS — F31.30 BIPOLAR DISORDER, CURRENT EPISODE DEPRESSED, MILD OR MODERATE SEVERITY, UNSPECIFIED: ICD-10-CM

## 2019-03-07 DIAGNOSIS — F10.231 ALCOHOL DEPENDENCE WITH WITHDRAWAL DELIRIUM: ICD-10-CM

## 2019-03-07 DIAGNOSIS — S11.91XA LACERATION WITHOUT FOREIGN BODY OF UNSPECIFIED PART OF NECK, INITIAL ENCOUNTER: ICD-10-CM
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
## 2019-03-07 DIAGNOSIS — F10.10 ALCOHOL ABUSE, UNCOMPLICATED: ICD-10-CM

## 2019-03-07 LAB
ABO RH CONFIRMATION: SIGNIFICANT CHANGE UP
ANION GAP SERPL CALC-SCNC: 14 MMOL/L — SIGNIFICANT CHANGE UP (ref 5–17)
BASOPHILS # BLD AUTO: 0 K/UL — SIGNIFICANT CHANGE UP (ref 0–0.2)
BASOPHILS NFR BLD AUTO: 0.1 % — SIGNIFICANT CHANGE UP (ref 0–2)
BUN SERPL-MCNC: 13 MG/DL — SIGNIFICANT CHANGE UP (ref 8–20)
CALCIUM SERPL-MCNC: 7.6 MG/DL — LOW (ref 8.6–10.2)
CHLORIDE SERPL-SCNC: 103 MMOL/L — SIGNIFICANT CHANGE UP (ref 98–107)
CO2 SERPL-SCNC: 25 MMOL/L — SIGNIFICANT CHANGE UP (ref 22–29)
CREAT SERPL-MCNC: 0.63 MG/DL — SIGNIFICANT CHANGE UP (ref 0.5–1.3)
EOSINOPHIL # BLD AUTO: 0 K/UL — SIGNIFICANT CHANGE UP (ref 0–0.5)
EOSINOPHIL NFR BLD AUTO: 1 % — SIGNIFICANT CHANGE UP (ref 0–6)
GAS PNL BLDA: SIGNIFICANT CHANGE UP
GLUCOSE SERPL-MCNC: 140 MG/DL — HIGH (ref 70–115)
HCT VFR BLD CALC: 38.8 % — LOW (ref 42–52)
HGB BLD-MCNC: 13.2 G/DL — LOW (ref 14–18)
LYMPHOCYTES # BLD AUTO: 0.6 K/UL — LOW (ref 1–4.8)
LYMPHOCYTES # BLD AUTO: 3 % — LOW (ref 20–55)
MAGNESIUM SERPL-MCNC: 2 MG/DL — SIGNIFICANT CHANGE UP (ref 1.6–2.6)
MCHC RBC-ENTMCNC: 29.1 PG — SIGNIFICANT CHANGE UP (ref 27–31)
MCHC RBC-ENTMCNC: 34 G/DL — SIGNIFICANT CHANGE UP (ref 32–36)
MCV RBC AUTO: 85.7 FL — SIGNIFICANT CHANGE UP (ref 80–94)
MONOCYTES # BLD AUTO: 0.1 K/UL — SIGNIFICANT CHANGE UP (ref 0–0.8)
MONOCYTES NFR BLD AUTO: 2 % — LOW (ref 3–10)
NEUTROPHILS # BLD AUTO: 7.8 K/UL — SIGNIFICANT CHANGE UP (ref 1.8–8)
NEUTROPHILS NFR BLD AUTO: 93 % — HIGH (ref 37–73)
NEUTS BAND # BLD: 1 % — SIGNIFICANT CHANGE UP (ref 0–8)
PHOSPHATE SERPL-MCNC: 2.4 MG/DL — SIGNIFICANT CHANGE UP (ref 2.4–4.7)
PLAT MORPH BLD: NORMAL — SIGNIFICANT CHANGE UP
PLATELET # BLD AUTO: 221 K/UL — SIGNIFICANT CHANGE UP (ref 150–400)
POTASSIUM SERPL-MCNC: 4 MMOL/L — SIGNIFICANT CHANGE UP (ref 3.5–5.3)
POTASSIUM SERPL-SCNC: 4 MMOL/L — SIGNIFICANT CHANGE UP (ref 3.5–5.3)
RBC # BLD: 4.53 M/UL — LOW (ref 4.6–6.2)
RBC # FLD: 13.5 % — SIGNIFICANT CHANGE UP (ref 11–15.6)
RBC BLD AUTO: NORMAL — SIGNIFICANT CHANGE UP
SODIUM SERPL-SCNC: 142 MMOL/L — SIGNIFICANT CHANGE UP (ref 135–145)
WBC # BLD: 8.5 K/UL — SIGNIFICANT CHANGE UP (ref 4.8–10.8)
WBC # FLD AUTO: 8.5 K/UL — SIGNIFICANT CHANGE UP (ref 4.8–10.8)

## 2019-03-07 PROCEDURE — 99232 SBSQ HOSP IP/OBS MODERATE 35: CPT

## 2019-03-07 PROCEDURE — 99222 1ST HOSP IP/OBS MODERATE 55: CPT

## 2019-03-07 RX ORDER — DEXMEDETOMIDINE HYDROCHLORIDE IN 0.9% SODIUM CHLORIDE 4 UG/ML
0.3 INJECTION INTRAVENOUS
Qty: 200 | Refills: 0 | Status: DISCONTINUED | OUTPATIENT
Start: 2019-03-07 | End: 2019-03-08

## 2019-03-07 RX ORDER — SODIUM CHLORIDE 9 MG/ML
2000 INJECTION, SOLUTION INTRAVENOUS ONCE
Qty: 0 | Refills: 0 | Status: COMPLETED | OUTPATIENT
Start: 2019-03-07 | End: 2019-03-07

## 2019-03-07 RX ADMIN — Medication 2 MILLIGRAM(S): at 05:03

## 2019-03-07 RX ADMIN — DEXMEDETOMIDINE HYDROCHLORIDE IN 0.9% SODIUM CHLORIDE 6.16 MICROGRAM(S)/KG/HR: 4 INJECTION INTRAVENOUS at 11:26

## 2019-03-07 RX ADMIN — Medication 1 PATCH: at 11:26

## 2019-03-07 RX ADMIN — CHLORHEXIDINE GLUCONATE 1 APPLICATION(S): 213 SOLUTION TOPICAL at 11:27

## 2019-03-07 RX ADMIN — Medication 2 MILLIGRAM(S): at 08:50

## 2019-03-07 RX ADMIN — Medication 62.5 MILLIMOLE(S): at 05:58

## 2019-03-07 RX ADMIN — HYDROMORPHONE HYDROCHLORIDE 1 MILLIGRAM(S): 2 INJECTION INTRAMUSCULAR; INTRAVENOUS; SUBCUTANEOUS at 15:15

## 2019-03-07 RX ADMIN — SODIUM CHLORIDE 2000 MILLILITER(S): 9 INJECTION, SOLUTION INTRAVENOUS at 03:35

## 2019-03-07 RX ADMIN — Medication 2 MILLIGRAM(S): at 12:00

## 2019-03-07 RX ADMIN — Medication 1 TABLET(S): at 11:27

## 2019-03-07 RX ADMIN — HYDROMORPHONE HYDROCHLORIDE 1 MILLIGRAM(S): 2 INJECTION INTRAMUSCULAR; INTRAVENOUS; SUBCUTANEOUS at 14:56

## 2019-03-07 RX ADMIN — SODIUM CHLORIDE 125 MILLILITER(S): 9 INJECTION, SOLUTION INTRAVENOUS at 04:18

## 2019-03-07 RX ADMIN — Medication 105 MILLIGRAM(S): at 11:34

## 2019-03-07 RX ADMIN — HYDROMORPHONE HYDROCHLORIDE 1 MILLIGRAM(S): 2 INJECTION INTRAMUSCULAR; INTRAVENOUS; SUBCUTANEOUS at 03:47

## 2019-03-07 RX ADMIN — PANTOPRAZOLE SODIUM 40 MILLIGRAM(S): 20 TABLET, DELAYED RELEASE ORAL at 11:27

## 2019-03-07 RX ADMIN — PROPOFOL 10.8 MICROGRAM(S)/KG/MIN: 10 INJECTION, EMULSION INTRAVENOUS at 04:17

## 2019-03-07 RX ADMIN — HYDROMORPHONE HYDROCHLORIDE 1 MILLIGRAM(S): 2 INJECTION INTRAMUSCULAR; INTRAVENOUS; SUBCUTANEOUS at 00:24

## 2019-03-07 RX ADMIN — Medication 1 PATCH: at 18:03

## 2019-03-07 RX ADMIN — ENOXAPARIN SODIUM 40 MILLIGRAM(S): 100 INJECTION SUBCUTANEOUS at 11:26

## 2019-03-07 RX ADMIN — Medication 1 MILLIGRAM(S): at 11:27

## 2019-03-07 RX ADMIN — HYDROMORPHONE HYDROCHLORIDE 1 MILLIGRAM(S): 2 INJECTION INTRAMUSCULAR; INTRAVENOUS; SUBCUTANEOUS at 00:17

## 2019-03-07 RX ADMIN — HYDROMORPHONE HYDROCHLORIDE 1 MILLIGRAM(S): 2 INJECTION INTRAMUSCULAR; INTRAVENOUS; SUBCUTANEOUS at 03:31

## 2019-03-07 RX ADMIN — Medication 4 MILLIGRAM(S): at 06:38

## 2019-03-07 RX ADMIN — DEXMEDETOMIDINE HYDROCHLORIDE IN 0.9% SODIUM CHLORIDE 6.16 MICROGRAM(S)/KG/HR: 4 INJECTION INTRAVENOUS at 16:30
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
## 2019-03-07 NOTE — BEHAVIORAL HEALTH ASSESSMENT NOTE - NSBHCHARTREVIEWLAB_PSY_A_CORE FT
13.2   8.5   )-----------( 221      ( 07 Mar 2019 03:59 )             38.8     03-07  142  |  103  |  13.0  ----------------------------<  140<H>  4.0   |  25.0  |  0.63    Ca    7.6<L>      07 Mar 2019 03:59  Phos  2.4     03-07  Mg     2.0     03-07    TPro  7.5  /  Alb  4.9  /  TBili  0.4  /  DBili  x   /  AST  42<H>  /  ALT  33  /  AlkPhos  82  03-06    LIVER FUNCTIONS - ( 06 Mar 2019 10:59 )  Alb: 4.9 g/dL / Pro: 7.5 g/dL / ALK PHOS: 82 U/L / ALT: 33 U/L / AST: 42 U/L / GGT: x           PT/INR - ( 06 Mar 2019 10:59 )   PT: 11.0 sec;   INR: 0.96 ratio       PTT - ( 06 Mar 2019 10:59 )  PTT:31.4 sec

## 2019-03-07 NOTE — BEHAVIORAL HEALTH ASSESSMENT NOTE - HOMICIDALITY / AGGRESSION (CURRENT/PAST)
Unable to assess Pt has advanced OA on X-ray.  They have failed all forms of conservative treatment including PT and Meds and Injections as necessary.  They are her for Total Joint Replacement.
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
## 2019-03-07 NOTE — BEHAVIORAL HEALTH ASSESSMENT NOTE - NSBHCHARTREVIEWVS_PSY_A_CORE FT
T(C): 36.9 (07 Mar 2019 12:00), Max: 37.7 (06 Mar 2019 20:48)  T(F): 98.4 (07 Mar 2019 12:00), Max: 99.9 (06 Mar 2019 20:48)  HR: 93 (07 Mar 2019 12:00) (64 - 120)  BP: 151/75 (07 Mar 2019 12:00) (94/51 - 151/75)  BP(mean): 91 (07 Mar 2019 12:00) (67 - 99)  RR: 34 (07 Mar 2019 12:00) (10 - 34)  SpO2: 99% (07 Mar 2019 12:00) (94% - 100%)
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
## 2019-03-07 NOTE — BEHAVIORAL HEALTH ASSESSMENT NOTE - NSBHCONSULTMEDS_PSY_A_CORE FT
clonidine Patch 0.1 mG/24Hr(s) 1 patch Transdermal every 7 days  dexmedetomidine Infusion 0.3 MICROgram(s)/kG/Hr (6.157 mL/Hr) IV Continuous <Continuous>

## 2019-03-07 NOTE — CHART NOTE - NSCHARTNOTEFT_GEN_A_CORE
Pt self extubated at 06:00 while on propofol and receiving Dilaudid and Ativan IV pushes for agitation.  Pt remains with hoarse voice as reported prior to intubation but without stridor and without respiratory distress.  PT tolerating secretions.  1:1 constant observation ordered for possible suicide attempt.  Placed on CIWA to monitor for withdrawal.  Propofol changed to precedex.  Dr. Hernandez notified.
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
## 2019-03-07 NOTE — BEHAVIORAL HEALTH ASSESSMENT NOTE - ACCESS TO FIREARM
----- Message from Fozia Esparza sent at 1/10/2017  9:09 AM CST -----  Regarding: Past Thyroid Ultrasound For Comparison  Contact: [phone]    [facility]: [facility] of [address] [address]           Type/Exam: Ultrasound/US Thyroid           Pt. Name: FOZIA ESPARZA Sex: F : 1970      MRN: [id number] Account#: [id number]      Ordering Provider: HARDY GEORGE      Exam Date and Time: 11/12/15 1255 Location: US           Result -           PROCEDURE: US THYROID           COMPARISON: None.           INDICATIONS: SOFT TISSUE MASS IN THROAT, ENLARGED THYROID           TECHNIQUE: High-resolution ultrasound examination of the thyroid gland was      performed.           FINDINGS:      RIGHT LOBE: Right lobe measures 5.0 centimeters along its long axis. There      is a complex 8 millimeter x 4 millimeter upper pole cystic structure as well as      a hypoechoic complex cystic structure along the inferior aspect measuring 0.8 x      0.7 x 0.5 centimeters. There is some scattered smaller cystic foci within the      mid aspect of the right lobe of the thyroid.      LEFT LOBE: There is a dominant mostly solid heterogeneous focus which is      seen within the superior aspect of the left lobe of the thyroid measuring 2.1 x      1.0 centimeters. There is some other tiny cystic foci she there is truly      inferiorly.      ISTHMUS: 0.6 centimeter      OTHER: None.           CONCLUSION: Dominant solid abnormality within the superior aspect of the      left lobe of the thyroid. Recommend fine-needle aspiration to further evaluate.                     Dictated by: Clement Leo M.D. on 2015 at 13:59      Electronically Signed By: Clement Leo M.D. on 2015 at 14:01      Thank you,    Fozia Esparza  
Message given to Luis  
Unable to assess

## 2019-03-08 LAB
ANION GAP SERPL CALC-SCNC: 12 MMOL/L — SIGNIFICANT CHANGE UP (ref 5–17)
BASOPHILS # BLD AUTO: 0 K/UL — SIGNIFICANT CHANGE UP (ref 0–0.2)
BASOPHILS NFR BLD AUTO: 0.2 % — SIGNIFICANT CHANGE UP (ref 0–2)
BUN SERPL-MCNC: 9 MG/DL — SIGNIFICANT CHANGE UP (ref 8–20)
CALCIUM SERPL-MCNC: 8.1 MG/DL — LOW (ref 8.6–10.2)
CHLORIDE SERPL-SCNC: 107 MMOL/L — SIGNIFICANT CHANGE UP (ref 98–107)
CO2 SERPL-SCNC: 25 MMOL/L — SIGNIFICANT CHANGE UP (ref 22–29)
CREAT SERPL-MCNC: 0.63 MG/DL — SIGNIFICANT CHANGE UP (ref 0.5–1.3)
EOSINOPHIL # BLD AUTO: 0 K/UL — SIGNIFICANT CHANGE UP (ref 0–0.5)
EOSINOPHIL NFR BLD AUTO: 0.2 % — SIGNIFICANT CHANGE UP (ref 0–5)
GLUCOSE SERPL-MCNC: 126 MG/DL — HIGH (ref 70–115)
HCT VFR BLD CALC: 35.7 % — LOW (ref 42–52)
HGB BLD-MCNC: 12.3 G/DL — LOW (ref 14–18)
LYMPHOCYTES # BLD AUTO: 1.9 K/UL — SIGNIFICANT CHANGE UP (ref 1–4.8)
LYMPHOCYTES # BLD AUTO: 30.1 % — SIGNIFICANT CHANGE UP (ref 20–55)
MAGNESIUM SERPL-MCNC: 2 MG/DL — SIGNIFICANT CHANGE UP (ref 1.6–2.6)
MCHC RBC-ENTMCNC: 29.6 PG — SIGNIFICANT CHANGE UP (ref 27–31)
MCHC RBC-ENTMCNC: 34.5 G/DL — SIGNIFICANT CHANGE UP (ref 32–36)
MCV RBC AUTO: 85.8 FL — SIGNIFICANT CHANGE UP (ref 80–94)
MONOCYTES # BLD AUTO: 0.5 K/UL — SIGNIFICANT CHANGE UP (ref 0–0.8)
MONOCYTES NFR BLD AUTO: 7.5 % — SIGNIFICANT CHANGE UP (ref 3–10)
NEUTROPHILS # BLD AUTO: 3.9 K/UL — SIGNIFICANT CHANGE UP (ref 1.8–8)
NEUTROPHILS NFR BLD AUTO: 61.8 % — SIGNIFICANT CHANGE UP (ref 37–73)
PHOSPHATE SERPL-MCNC: 1.8 MG/DL — LOW (ref 2.4–4.7)
PLATELET # BLD AUTO: 180 K/UL — SIGNIFICANT CHANGE UP (ref 150–400)
POTASSIUM SERPL-MCNC: 3.5 MMOL/L — SIGNIFICANT CHANGE UP (ref 3.5–5.3)
POTASSIUM SERPL-SCNC: 3.5 MMOL/L — SIGNIFICANT CHANGE UP (ref 3.5–5.3)
RBC # BLD: 4.16 M/UL — LOW (ref 4.6–6.2)
RBC # FLD: 13.1 % — SIGNIFICANT CHANGE UP (ref 11–15.6)
SODIUM SERPL-SCNC: 144 MMOL/L — SIGNIFICANT CHANGE UP (ref 135–145)
WBC # BLD: 6.3 K/UL — SIGNIFICANT CHANGE UP (ref 4.8–10.8)
WBC # FLD AUTO: 6.3 K/UL — SIGNIFICANT CHANGE UP (ref 4.8–10.8)

## 2019-03-08 RX ORDER — OXYCODONE HYDROCHLORIDE 5 MG/1
5 TABLET ORAL EVERY 4 HOURS
Qty: 0 | Refills: 0 | Status: DISCONTINUED | OUTPATIENT
Start: 2019-03-08 | End: 2019-03-12

## 2019-03-08 RX ORDER — POTASSIUM CHLORIDE 20 MEQ
40 PACKET (EA) ORAL ONCE
Qty: 0 | Refills: 0 | Status: COMPLETED | OUTPATIENT
Start: 2019-03-08 | End: 2019-03-08

## 2019-03-08 RX ORDER — POTASSIUM PHOSPHATE, MONOBASIC POTASSIUM PHOSPHATE, DIBASIC 236; 224 MG/ML; MG/ML
30 INJECTION, SOLUTION INTRAVENOUS ONCE
Qty: 0 | Refills: 0 | Status: COMPLETED | OUTPATIENT
Start: 2019-03-08 | End: 2019-03-08

## 2019-03-08 RX ORDER — THIAMINE MONONITRATE (VIT B1) 100 MG
100 TABLET ORAL DAILY
Qty: 0 | Refills: 0 | Status: DISCONTINUED | OUTPATIENT
Start: 2019-03-09 | End: 2019-03-10

## 2019-03-08 RX ADMIN — Medication 1 TABLET(S): at 11:30

## 2019-03-08 RX ADMIN — Medication 1 PATCH: at 13:41

## 2019-03-08 RX ADMIN — Medication 40 MILLIEQUIVALENT(S): at 07:10

## 2019-03-08 RX ADMIN — PANTOPRAZOLE SODIUM 40 MILLIGRAM(S): 20 TABLET, DELAYED RELEASE ORAL at 11:30

## 2019-03-08 RX ADMIN — Medication 1 MILLIGRAM(S): at 11:30

## 2019-03-08 RX ADMIN — Medication 650 MILLIGRAM(S): at 10:05

## 2019-03-08 RX ADMIN — ENOXAPARIN SODIUM 40 MILLIGRAM(S): 100 INJECTION SUBCUTANEOUS at 11:30

## 2019-03-08 RX ADMIN — HYDROMORPHONE HYDROCHLORIDE 1 MILLIGRAM(S): 2 INJECTION INTRAMUSCULAR; INTRAVENOUS; SUBCUTANEOUS at 10:32

## 2019-03-08 RX ADMIN — Medication 1 PATCH: at 07:10

## 2019-03-08 RX ADMIN — HYDROMORPHONE HYDROCHLORIDE 1 MILLIGRAM(S): 2 INJECTION INTRAMUSCULAR; INTRAVENOUS; SUBCUTANEOUS at 10:06

## 2019-03-08 RX ADMIN — Medication 105 MILLIGRAM(S): at 12:08

## 2019-03-08 RX ADMIN — POTASSIUM PHOSPHATE, MONOBASIC POTASSIUM PHOSPHATE, DIBASIC 83.33 MILLIMOLE(S): 236; 224 INJECTION, SOLUTION INTRAVENOUS at 07:10

## 2019-03-08 RX ADMIN — Medication 650 MILLIGRAM(S): at 10:32
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
## 2019-03-09 RX ORDER — POTASSIUM CHLORIDE 20 MEQ
40 PACKET (EA) ORAL ONCE
Qty: 0 | Refills: 0 | Status: COMPLETED | OUTPATIENT
Start: 2019-03-09 | End: 2019-03-09

## 2019-03-09 RX ADMIN — ENOXAPARIN SODIUM 40 MILLIGRAM(S): 100 INJECTION SUBCUTANEOUS at 13:38

## 2019-03-09 RX ADMIN — PANTOPRAZOLE SODIUM 40 MILLIGRAM(S): 20 TABLET, DELAYED RELEASE ORAL at 13:39

## 2019-03-09 RX ADMIN — Medication 1 MILLIGRAM(S): at 13:38

## 2019-03-09 RX ADMIN — Medication 85 MILLIMOLE(S): at 13:37

## 2019-03-09 RX ADMIN — Medication 100 MILLIGRAM(S): at 13:38

## 2019-03-09 RX ADMIN — Medication 40 MILLIEQUIVALENT(S): at 13:39

## 2019-03-09 RX ADMIN — Medication 1 TABLET(S): at 13:38

## 2019-03-09 NOTE — PROGRESS NOTE ADULT - SUBJECTIVE AND OBJECTIVE BOX
Acute Care Surgery/Trauma Surgery Progress Note:  Patient seen s/p downgrade from SICU. No acute events reported. Patient afebrile, VSS. Pain well controlled. Tolerating diet. Denies n/v/f/c/cp/sob. Remains on 1:1.     Diet, Regular (03-07-19 @ 09:46)      Scheduled Medications:   enoxaparin Injectable 40 milliGRAM(s) SubCutaneous daily  folic acid 1 milliGRAM(s) Oral daily  multivitamin 1 Tablet(s) Oral daily  pantoprazole  Injectable 40 milliGRAM(s) IV Push daily  thiamine Injectable 100 milliGRAM(s) IV Push daily    PRN Medications:  acetaminophen    Suspension .. 650 milliGRAM(s) Oral every 6 hours PRN Mild Pain (1 - 3)  oxyCODONE    IR 5 milliGRAM(s) Oral every 4 hours PRN Moderate Pain (4 - 6)      Objective:   T(F): 98.9 (03-09 @ 00:00), Max: 99.5 (03-08 @ 04:00)  HR: 54 (03-09 @ 00:00) (54 - 94)  BP: 105/61 (03-09 @ 00:00) (105/61 - 137/84)  BP(mean): 79 (03-08 @ 14:00) (79 - 105)  ABP: --  ABP(mean): --  RR: 18 (03-09 @ 00:00) (15 - 25)  SpO2: 100% (03-09 @ 00:00) (95% - 100%)      Physical Exam:   GEN: patient resting comfortably in bed, in no acute distress  RESP: respirations are unlabored, no accessory muscle use, no conversational dyspnea  CVS: RRR  GI: Abdomen soft, non-tender, non-distended, no rebound tenderness / guarding    I&O's    03-07 @ 07:01  -  03-08 @ 07:00  --------------------------------------------------------  IN:    dexmedetomidine Infusion: 133.6 mL    multiple electrolytes Injection Type 1multiple electrolytes Injection Type 1: 1000 mL    Oral Fluid: 780 mL    Solution: 270.8 mL    Solution: 100 mL  Total IN: 2284.4 mL    OUT:    Incontinent per Condom Catheter: 6100 mL    Indwelling Catheter - Urethral: 410 mL    Voided: 2000 mL  Total OUT: 8510 mL    Total NET: -6225.6 mL      03-08 @ 07:01  -  03-09 @ 02:58  --------------------------------------------------------  IN:    Oral Fluid: 240 mL    Solution: 416.5 mL    Solution: 100 mL  Total IN: 756.5 mL    OUT:    Voided: 550 mL  Total OUT: 550 mL    Total NET: 206.5 mL          LABS:                        12.3   6.3   )-----------( 180      ( 08 Mar 2019 04:28 )             35.7     03-08    144  |  107  |  9.0  ----------------------------<  126<H>  3.5   |  25.0  |  0.63    Ca    8.1<L>      08 Mar 2019 04:28  Phos  1.8     03-08  Mg     2.0     03-08            MICROBIOLOGY:       PATHOLOGY: Acute Care Surgery/Trauma Surgery Progress Note:  Patient seen s/p downgrade from SICU. No acute events reported. Patient afebrile, VSS. Pain well controlled. Tolerating diet. Denies n/v/f/c/cp/sob. Remains on 1:1.     Diet, Regular (03-07-19 @ 09:46)      Scheduled Medications:   enoxaparin Injectable 40 milliGRAM(s) SubCutaneous daily  folic acid 1 milliGRAM(s) Oral daily  multivitamin 1 Tablet(s) Oral daily  pantoprazole  Injectable 40 milliGRAM(s) IV Push daily  thiamine Injectable 100 milliGRAM(s) IV Push daily    PRN Medications:  acetaminophen    Suspension .. 650 milliGRAM(s) Oral every 6 hours PRN Mild Pain (1 - 3)  oxyCODONE    IR 5 milliGRAM(s) Oral every 4 hours PRN Moderate Pain (4 - 6)      Objective:   T(F): 98.9 (03-09 @ 00:00), Max: 99.5 (03-08 @ 04:00)  HR: 54 (03-09 @ 00:00) (54 - 94)  BP: 105/61 (03-09 @ 00:00) (105/61 - 137/84)  BP(mean): 79 (03-08 @ 14:00) (79 - 105)  ABP: --  ABP(mean): --  RR: 18 (03-09 @ 00:00) (15 - 25)  SpO2: 100% (03-09 @ 00:00) (95% - 100%)      Physical Exam:   GEN: patient resting comfortably in bed, in no acute distress  HEENT: Neck laceration healing well.   RESP: respirations are unlabored, no accessory muscle use, no conversational dyspnea  CVS: RRR  GI: Abdomen soft, non-tender, non-distended, no rebound tenderness / guarding    I&O's    03-07 @ 07:01  -  03-08 @ 07:00  --------------------------------------------------------  IN:    dexmedetomidine Infusion: 133.6 mL    multiple electrolytes Injection Type 1multiple electrolytes Injection Type 1: 1000 mL    Oral Fluid: 780 mL    Solution: 270.8 mL    Solution: 100 mL  Total IN: 2284.4 mL    OUT:    Incontinent per Condom Catheter: 6100 mL    Indwelling Catheter - Urethral: 410 mL    Voided: 2000 mL  Total OUT: 8510 mL    Total NET: -6225.6 mL      03-08 @ 07:01  -  03-09 @ 02:58  --------------------------------------------------------  IN:    Oral Fluid: 240 mL    Solution: 416.5 mL    Solution: 100 mL  Total IN: 756.5 mL    OUT:    Voided: 550 mL  Total OUT: 550 mL    Total NET: 206.5 mL          LABS:                        12.3   6.3   )-----------( 180      ( 08 Mar 2019 04:28 )             35.7     03-08    144  |  107  |  9.0  ----------------------------<  126<H>  3.5   |  25.0  |  0.63    Ca    8.1<L>      08 Mar 2019 04:28  Phos  1.8     03-08  Mg     2.0     03-08            MICROBIOLOGY:       PATHOLOGY:
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
## 2019-03-10 PROCEDURE — 99232 SBSQ HOSP IP/OBS MODERATE 35: CPT

## 2019-03-10 RX ORDER — THIAMINE MONONITRATE (VIT B1) 100 MG
100 TABLET ORAL DAILY
Qty: 0 | Refills: 0 | Status: DISCONTINUED | OUTPATIENT
Start: 2019-03-10 | End: 2019-03-12

## 2019-03-10 RX ORDER — PANTOPRAZOLE SODIUM 20 MG/1
40 TABLET, DELAYED RELEASE ORAL
Qty: 0 | Refills: 0 | Status: DISCONTINUED | OUTPATIENT
Start: 2019-03-10 | End: 2019-03-12

## 2019-03-10 RX ADMIN — Medication 100 MILLIGRAM(S): at 17:03

## 2019-03-10 RX ADMIN — ENOXAPARIN SODIUM 40 MILLIGRAM(S): 100 INJECTION SUBCUTANEOUS at 11:13

## 2019-03-10 RX ADMIN — PANTOPRAZOLE SODIUM 40 MILLIGRAM(S): 20 TABLET, DELAYED RELEASE ORAL at 11:13

## 2019-03-10 RX ADMIN — Medication 1 MILLIGRAM(S): at 11:13

## 2019-03-10 RX ADMIN — Medication 1 TABLET(S): at 11:13

## 2019-03-10 NOTE — PROGRESS NOTE ADULT - SUBJECTIVE AND OBJECTIVE BOX
INTERVAL HPI/OVERNIGHT EVENTS: No acute events overnight      MEDICATIONS  (STANDING):  enoxaparin Injectable 40 milliGRAM(s) SubCutaneous daily  folic acid 1 milliGRAM(s) Oral daily  multivitamin 1 Tablet(s) Oral daily  pantoprazole    Tablet 40 milliGRAM(s) Oral before breakfast  thiamine 100 milliGRAM(s) Oral daily    MEDICATIONS  (PRN):  acetaminophen    Suspension .. 650 milliGRAM(s) Oral every 6 hours PRN Mild Pain (1 - 3)  oxyCODONE    IR 5 milliGRAM(s) Oral every 4 hours PRN Moderate Pain (4 - 6)      Vital Signs Last 24 Hrs  T(C): 36.7 (10 Mar 2019 09:05), Max: 36.7 (10 Mar 2019 09:05)  T(F): 98 (10 Mar 2019 09:05), Max: 98 (10 Mar 2019 09:05)  HR: 56 (10 Mar 2019 09:05) (56 - 56)  BP: 126/72 (10 Mar 2019 09:05) (126/72 - 126/72)  BP(mean): --  RR: 18 (10 Mar 2019 09:05) (18 - 18)  SpO2: 95% (10 Mar 2019 09:05) (95% - 95%)    Physical Exam:   GEN: patient resting comfortably in bed, in no acute distress  HEENT: Neck laceration healing well.   RESP: respirations are unlabored, no accessory muscle use, no conversational dyspnea  CVS: RRR  GI: Abdomen soft, non-tender, non-distended, no rebound tenderness / guarding      I&O's Detail    09 Mar 2019 06:01  -  10 Mar 2019 07:00  --------------------------------------------------------  IN:    Oral Fluid: 710 mL  Total IN: 710 mL    OUT:  Total OUT: 0 mL    Total NET: 710 mL      10 Mar 2019 07:01  -  10 Mar 2019 16:32  --------------------------------------------------------  IN:  Total IN: 0 mL    OUT:    Voided: 2 mL  Total OUT: 2 mL    Total NET: -2 mL          LABS:                RADIOLOGY & ADDITIONAL STUDIES:
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
## 2019-03-11 ENCOUNTER — TRANSCRIPTION ENCOUNTER (OUTPATIENT)
Age: 34
End: 2019-03-11

## 2019-03-11 DIAGNOSIS — F29 UNSPECIFIED PSYCHOSIS NOT DUE TO A SUBSTANCE OR KNOWN PHYSIOLOGICAL CONDITION: ICD-10-CM

## 2019-03-11 PROCEDURE — 99233 SBSQ HOSP IP/OBS HIGH 50: CPT

## 2019-03-11 PROCEDURE — 99231 SBSQ HOSP IP/OBS SF/LOW 25: CPT

## 2019-03-11 RX ORDER — HALOPERIDOL DECANOATE 100 MG/ML
2 INJECTION INTRAMUSCULAR AT BEDTIME
Qty: 0 | Refills: 0 | Status: DISCONTINUED | OUTPATIENT
Start: 2019-03-11 | End: 2019-03-12

## 2019-03-11 RX ORDER — HALOPERIDOL DECANOATE 100 MG/ML
2 INJECTION INTRAMUSCULAR AT BEDTIME
Qty: 0 | Refills: 0 | Status: DISCONTINUED | OUTPATIENT
Start: 2019-03-11 | End: 2019-03-11

## 2019-03-11 RX ORDER — HALOPERIDOL DECANOATE 100 MG/ML
2.5 INJECTION INTRAMUSCULAR EVERY 6 HOURS
Qty: 0 | Refills: 0 | Status: DISCONTINUED | OUTPATIENT
Start: 2019-03-11 | End: 2019-03-12

## 2019-03-11 RX ADMIN — Medication 1 MILLIGRAM(S): at 11:35

## 2019-03-11 RX ADMIN — Medication 1 TABLET(S): at 11:35

## 2019-03-11 RX ADMIN — Medication 100 MILLIGRAM(S): at 11:35

## 2019-03-11 RX ADMIN — ENOXAPARIN SODIUM 40 MILLIGRAM(S): 100 INJECTION SUBCUTANEOUS at 11:35

## 2019-03-11 NOTE — PROGRESS NOTE ADULT - SUBJECTIVE AND OBJECTIVE BOX
INTERVAL HPI/OVERNIGHT EVENTS:    Patient seen and examined. No events overnight.   Tolerating diet. No pain complaints  Evaluated by Psych and cleared to go home.   Patient still on 1:1 today    MEDICATIONS  (STANDING):  enoxaparin Injectable 40 milliGRAM(s) SubCutaneous daily  folic acid 1 milliGRAM(s) Oral daily  multivitamin 1 Tablet(s) Oral daily  pantoprazole    Tablet 40 milliGRAM(s) Oral before breakfast  thiamine 100 milliGRAM(s) Oral daily    MEDICATIONS  (PRN):  acetaminophen    Suspension .. 650 milliGRAM(s) Oral every 6 hours PRN Mild Pain (1 - 3)  oxyCODONE    IR 5 milliGRAM(s) Oral every 4 hours PRN Moderate Pain (4 - 6)      Vital Signs Last 24 Hrs  T(C): 36.4 (11 Mar 2019 08:53), Max: 36.4 (11 Mar 2019 08:53)  T(F): 97.6 (11 Mar 2019 08:53), Max: 97.6 (11 Mar 2019 08:53)  HR: 67 (11 Mar 2019 08:53) (58 - 71)  BP: 105/68 (11 Mar 2019 08:53) (105/68 - 128/84)  BP(mean): --  RR: 18 (11 Mar 2019 08:53) (18 - 18)  SpO2: 99% (11 Mar 2019 08:53) (96% - 99%)    Physical Exam:  Neuro: NAD, AOx3. Pleasant   GEN: patient resting comfortably in bed, in no acute distress  HEENT: Neck laceration healing well. Sutures in place  RESP: respirations are unlabored, no accessory muscle use, no conversational dyspnea  CVS: RRR  GI: Abdomen soft, non-tender, non-distended, no rebound tenderness / guarding      I&O's Detail    10 Mar 2019 07:01  -  11 Mar 2019 07:00  --------------------------------------------------------  IN:  Total IN: 0 mL    OUT:    Voided: 3 mL  Total OUT: 3 mL    Total NET: -3 mL          LABS:                RADIOLOGY & ADDITIONAL STUDIES:
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
## 2019-03-11 NOTE — DISCHARGE NOTE ADULT - PLAN OF CARE
alleviation of pain and symptoms Sutures were removed prior to discharge. Please follow up with your primary care physician regarding your hospitalization. Follow up with psychiatry as outpatient. Return to ED if unable to tolerate pain, difficulty breathing or inability to perform usual activities of daily living. Sutures were removed prior to discharge. Apply bacitracin once daily to wound.  Ok to leave uncovered.  Clean with soap and water daily.  Please follow up with your primary care physician regarding your hospitalization. Follow up with psychiatry as outpatient. Return to ED if unable to tolerate pain, difficulty breathing or inability to perform usual activities of daily living. Sutures were removed prior to discharge, steristrips were applied.  Ok to leave steristrips uncovered.  Clean with soap and water daily.  Allow steristrips to fall off on their own, do not pull off.   Please follow up with your primary care physician regarding your hospitalization. Follow up with psychiatry as outpatient. Return to ED if unable to tolerate pain, difficulty breathing or inability to perform usual activities of daily living.
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
## 2019-03-11 NOTE — DISCHARGE NOTE ADULT - CARE PROVIDER_API CALL
Houston Hermosillo)  Psychiatry  [address] [place] [address]  Phone: [phone]  Fax: [phone]  Follow Up Time: Sathish Patrick)  Psych  Inpatient  [address] [place] [address]  Phone: [phone]  Fax: [phone]  Follow Up Time:

## 2019-03-11 NOTE — DISCHARGE NOTE ADULT - PATIENT PORTAL LINK FT
You can access the [facility] Patient Portal, offered by [facility], by registering with the following website: [email or web address]
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
## 2019-03-11 NOTE — PROGRESS NOTE ADULT - ATTENDING COMMENTS
No new issues.  Remains on 1:1 for suicide attempt.  Await further psych input.  Resume home antipsychotics but await further input from psych on this too.
Patient seen and examined,   neck wound healing well.  Ok to remove sutures  Psych to asses for safe discharge  no trauma contraindication liudmila d/c
Agree with above assessment.  The patient is without pain and states that he is feeling better overall.  The left neck wound is with intact sutures and no signs of swelling, no hematoma, and no signs of infection.  The patient is to be reevaluated by Psych.  Disposition planning pending Psych eval.
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
## 2019-03-12 ENCOUNTER — INPATIENT (INPATIENT)
Facility: HOSPITAL | Age: 34
LOS: 9 days | Discharge: HOME | End: 2019-03-22
Attending: PSYCHIATRY & NEUROLOGY | Admitting: PSYCHIATRY & NEUROLOGY
Payer: MEDICAID

## 2019-03-12 VITALS — WEIGHT: 177.91 LBS | HEIGHT: 68 IN | TEMPERATURE: 98 F

## 2019-03-12 VITALS
OXYGEN SATURATION: 96 % | DIASTOLIC BLOOD PRESSURE: 76 MMHG | RESPIRATION RATE: 18 BRPM | TEMPERATURE: 98 F | HEART RATE: 78 BPM | SYSTOLIC BLOOD PRESSURE: 122 MMHG

## 2019-03-12 DIAGNOSIS — F23 BRIEF PSYCHOTIC DISORDER: ICD-10-CM

## 2019-03-12 PROCEDURE — 70498 CT ANGIOGRAPHY NECK: CPT

## 2019-03-12 PROCEDURE — 94002 VENT MGMT INPAT INIT DAY: CPT

## 2019-03-12 PROCEDURE — 84100 ASSAY OF PHOSPHORUS: CPT

## 2019-03-12 PROCEDURE — 93010 ELECTROCARDIOGRAM REPORT: CPT

## 2019-03-12 PROCEDURE — 31500 INSERT EMERGENCY AIRWAY: CPT

## 2019-03-12 PROCEDURE — 82330 ASSAY OF CALCIUM: CPT

## 2019-03-12 PROCEDURE — 83605 ASSAY OF LACTIC ACID: CPT

## 2019-03-12 PROCEDURE — 82435 ASSAY OF BLOOD CHLORIDE: CPT

## 2019-03-12 PROCEDURE — 80048 BASIC METABOLIC PNL TOTAL CA: CPT

## 2019-03-12 PROCEDURE — 99291 CRITICAL CARE FIRST HOUR: CPT | Mod: 25

## 2019-03-12 PROCEDURE — G0390: CPT

## 2019-03-12 PROCEDURE — 85610 PROTHROMBIN TIME: CPT

## 2019-03-12 PROCEDURE — 82947 ASSAY GLUCOSE BLOOD QUANT: CPT

## 2019-03-12 PROCEDURE — 85014 HEMATOCRIT: CPT

## 2019-03-12 PROCEDURE — 36415 COLL VENOUS BLD VENIPUNCTURE: CPT

## 2019-03-12 PROCEDURE — 86900 BLOOD TYPING SEROLOGIC ABO: CPT

## 2019-03-12 PROCEDURE — 83690 ASSAY OF LIPASE: CPT

## 2019-03-12 PROCEDURE — 80307 DRUG TEST PRSMV CHEM ANLYZR: CPT

## 2019-03-12 PROCEDURE — 90715 TDAP VACCINE 7 YRS/> IM: CPT

## 2019-03-12 PROCEDURE — 84295 ASSAY OF SERUM SODIUM: CPT

## 2019-03-12 PROCEDURE — 70450 CT HEAD/BRAIN W/O DYE: CPT

## 2019-03-12 PROCEDURE — 93005 ELECTROCARDIOGRAM TRACING: CPT

## 2019-03-12 PROCEDURE — 86901 BLOOD TYPING SEROLOGIC RH(D): CPT

## 2019-03-12 PROCEDURE — 71045 X-RAY EXAM CHEST 1 VIEW: CPT

## 2019-03-12 PROCEDURE — 85730 THROMBOPLASTIN TIME PARTIAL: CPT

## 2019-03-12 PROCEDURE — 85027 COMPLETE CBC AUTOMATED: CPT

## 2019-03-12 PROCEDURE — 84132 ASSAY OF SERUM POTASSIUM: CPT

## 2019-03-12 PROCEDURE — 80053 COMPREHEN METABOLIC PANEL: CPT

## 2019-03-12 PROCEDURE — 82803 BLOOD GASES ANY COMBINATION: CPT

## 2019-03-12 PROCEDURE — 90792 PSYCH DIAG EVAL W/MED SRVCS: CPT

## 2019-03-12 PROCEDURE — 94003 VENT MGMT INPAT SUBQ DAY: CPT

## 2019-03-12 PROCEDURE — 86850 RBC ANTIBODY SCREEN: CPT

## 2019-03-12 PROCEDURE — 36600 WITHDRAWAL OF ARTERIAL BLOOD: CPT

## 2019-03-12 PROCEDURE — 83735 ASSAY OF MAGNESIUM: CPT

## 2019-03-12 PROCEDURE — 99232 SBSQ HOSP IP/OBS MODERATE 35: CPT

## 2019-03-12 RX ORDER — LANOLIN ALCOHOL/MO/W.PET/CERES
3 CREAM (GRAM) TOPICAL AT BEDTIME
Qty: 0 | Refills: 0 | Status: DISCONTINUED | OUTPATIENT
Start: 2019-03-12 | End: 2019-03-12

## 2019-03-12 RX ORDER — HALOPERIDOL DECANOATE 100 MG/ML
1 INJECTION INTRAMUSCULAR
Qty: 0 | Refills: 0 | COMMUNITY
Start: 2019-03-12

## 2019-03-12 RX ORDER — LANOLIN ALCOHOL/MO/W.PET/CERES
1 CREAM (GRAM) TOPICAL
Qty: 0 | Refills: 0 | COMMUNITY
Start: 2019-03-12

## 2019-03-12 RX ORDER — LANOLIN ALCOHOL/MO/W.PET/CERES
3 CREAM (GRAM) TOPICAL ONCE
Qty: 0 | Refills: 0 | Status: COMPLETED | OUTPATIENT
Start: 2019-03-12 | End: 2019-03-12

## 2019-03-12 RX ADMIN — PANTOPRAZOLE SODIUM 40 MILLIGRAM(S): 20 TABLET, DELAYED RELEASE ORAL at 04:27

## 2019-03-12 RX ADMIN — Medication 1 MILLIGRAM(S): at 11:17

## 2019-03-12 RX ADMIN — Medication 2 MILLIGRAM(S): at 21:15

## 2019-03-12 RX ADMIN — Medication 3 MILLIGRAM(S): at 04:27

## 2019-03-12 RX ADMIN — Medication 1 TABLET(S): at 11:17

## 2019-03-12 RX ADMIN — Medication 100 MILLIGRAM(S): at 11:17

## 2019-03-12 RX ADMIN — HALOPERIDOL DECANOATE 2 MILLIGRAM(S): 100 INJECTION INTRAMUSCULAR at 01:26

## 2019-03-12 RX ADMIN — ENOXAPARIN SODIUM 40 MILLIGRAM(S): 100 INJECTION SUBCUTANEOUS at 11:17

## 2019-03-12 NOTE — PROGRESS NOTE BEHAVIORAL HEALTH - NSBHCHARTREVIEWLAB_PSY_A_CORE FT
No recent labs
13.2   8.5   )-----------( 221      ( 07 Mar 2019 03:59 )             38.8     03-07  142  |  103  |  13.0  ----------------------------<  140<H>  4.0   |  25.0  |  0.63    Ca    7.6<L>      07 Mar 2019 03:59  Phos  2.4     03-07  Mg     2.0     03-07    TPro  7.5  /  Alb  4.9  /  TBili  0.4  /  DBili  x   /  AST  42<H>  /  ALT  33  /  AlkPhos  82  03-06    LIVER FUNCTIONS - ( 06 Mar 2019 10:59 )  Alb: 4.9 g/dL / Pro: 7.5 g/dL / ALK PHOS: 82 U/L / ALT: 33 U/L / AST: 42 U/L / GGT: x           PT/INR - ( 06 Mar 2019 10:59 )   PT: 11.0 sec;   INR: 0.96 ratio       PTT - ( 06 Mar 2019 10:59 )  PTT:31.4 sec
No recent lab work

## 2019-03-12 NOTE — PROGRESS NOTE BEHAVIORAL HEALTH - NSBHCHARTREVIEWVS_PSY_A_CORE FT
T(C): 36.4 (11 Mar 2019 23:43), Max: 36.7 (11 Mar 2019 16:50)  T(F): 97.5 (11 Mar 2019 23:43), Max: 98 (11 Mar 2019 16:50)  HR: 74 (11 Mar 2019 23:43) (74 - 95)  BP: 110/56 (11 Mar 2019 23:43) (110/56 - 135/92)  RR: 20 (11 Mar 2019 23:43) (18 - 20)  SpO2: 97% (11 Mar 2019 23:43) (97% - 98%)
T(C): 36.9 (07 Mar 2019 12:00), Max: 37.7 (06 Mar 2019 20:48)  T(F): 98.4 (07 Mar 2019 12:00), Max: 99.9 (06 Mar 2019 20:48)  HR: 93 (07 Mar 2019 12:00) (64 - 120)  BP: 151/75 (07 Mar 2019 12:00) (94/51 - 151/75)  BP(mean): 91 (07 Mar 2019 12:00) (67 - 99)  RR: 34 (07 Mar 2019 12:00) (10 - 34)  SpO2: 99% (07 Mar 2019 12:00) (94% - 100%)
T(C): 36.4 (11 Mar 2019 08:53), Max: 36.4 (11 Mar 2019 08:53)  T(F): 97.6 (11 Mar 2019 08:53), Max: 97.6 (11 Mar 2019 08:53)  HR: 67 (11 Mar 2019 08:53) (58 - 71)  BP: 105/68 (11 Mar 2019 08:53) (105/68 - 128/84)  RR: 18 (11 Mar 2019 08:53) (18 - 18)  SpO2: 99% (11 Mar 2019 08:53) (96% - 99%)
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
## 2019-03-12 NOTE — PROGRESS NOTE BEHAVIORAL HEALTH - RISK ASSESSMENT
High risk: Possible suicide attempt, self-extubation, patient in restraints
High Risk: High suspicion of self inflicted laceration, extensive psych hx, paranoid delusions, noncompliant with treatment,
High Risk: High suspicion of self inflicted laceration, extensive psych hx, paranoid delusions, noncompliant with treatment,

## 2019-03-12 NOTE — PROGRESS NOTE BEHAVIORAL HEALTH - NSBHCONSFOLLOWNEEDS_PSY_A_CORE
no psychiatric contraindications to discharge
Patient needs further psychiatric safety assessment prior to discharge
no psychiatric contraindications to discharge

## 2019-03-13 PROCEDURE — 99223 1ST HOSP IP/OBS HIGH 75: CPT

## 2019-03-13 PROCEDURE — 90853 GROUP PSYCHOTHERAPY: CPT

## 2019-03-13 PROCEDURE — 99231 SBSQ HOSP IP/OBS SF/LOW 25: CPT | Mod: 25

## 2019-03-13 RX ORDER — HYDROXYZINE HCL 10 MG
50 TABLET ORAL EVERY 6 HOURS
Qty: 0 | Refills: 0 | Status: DISCONTINUED | OUTPATIENT
Start: 2019-03-13 | End: 2019-03-22

## 2019-03-13 RX ORDER — FLUOXETINE HCL 10 MG
10 CAPSULE ORAL DAILY
Qty: 0 | Refills: 0 | Status: DISCONTINUED | OUTPATIENT
Start: 2019-03-13 | End: 2019-03-15

## 2019-03-13 RX ORDER — DIPHENHYDRAMINE HCL 50 MG
50 CAPSULE ORAL AT BEDTIME
Qty: 0 | Refills: 0 | Status: DISCONTINUED | OUTPATIENT
Start: 2019-03-13 | End: 2019-03-14

## 2019-03-13 RX ADMIN — Medication 2 MILLIGRAM(S): at 20:27
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
## 2019-03-14 LAB
ALBUMIN SERPL ELPH-MCNC: 4.5 G/DL — SIGNIFICANT CHANGE UP (ref 3.3–5)
ALP SERPL-CCNC: 56 U/L — SIGNIFICANT CHANGE UP (ref 40–120)
ALT FLD-CCNC: 92 U/L — HIGH (ref 4–41)
ANION GAP SERPL CALC-SCNC: 17 MMO/L — HIGH (ref 7–14)
AST SERPL-CCNC: 73 U/L — HIGH (ref 4–40)
BILIRUB SERPL-MCNC: 0.8 MG/DL — SIGNIFICANT CHANGE UP (ref 0.2–1.2)
BUN SERPL-MCNC: 15 MG/DL — SIGNIFICANT CHANGE UP (ref 7–23)
CALCIUM SERPL-MCNC: 9.3 MG/DL — SIGNIFICANT CHANGE UP (ref 8.4–10.5)
CHLORIDE SERPL-SCNC: 101 MMOL/L — SIGNIFICANT CHANGE UP (ref 98–107)
CO2 SERPL-SCNC: 22 MMOL/L — SIGNIFICANT CHANGE UP (ref 22–31)
CREAT SERPL-MCNC: 0.85 MG/DL — SIGNIFICANT CHANGE UP (ref 0.5–1.3)
GLUCOSE SERPL-MCNC: 52 MG/DL — LOW (ref 70–99)
HCT VFR BLD CALC: 42.3 % — SIGNIFICANT CHANGE UP (ref 39–50)
HGB BLD-MCNC: 14.2 G/DL — SIGNIFICANT CHANGE UP (ref 13–17)
MCHC RBC-ENTMCNC: 28.8 PG — SIGNIFICANT CHANGE UP (ref 27–34)
MCHC RBC-ENTMCNC: 33.6 % — SIGNIFICANT CHANGE UP (ref 32–36)
MCV RBC AUTO: 85.8 FL — SIGNIFICANT CHANGE UP (ref 80–100)
NRBC # FLD: 0 K/UL — LOW (ref 25–125)
PHOSPHATE SERPL-MCNC: 3.5 MG/DL — SIGNIFICANT CHANGE UP (ref 2.5–4.5)
PLATELET # BLD AUTO: 272 K/UL — SIGNIFICANT CHANGE UP (ref 150–400)
PMV BLD: 10 FL — SIGNIFICANT CHANGE UP (ref 7–13)
POTASSIUM SERPL-MCNC: 3.8 MMOL/L — SIGNIFICANT CHANGE UP (ref 3.5–5.3)
POTASSIUM SERPL-SCNC: 3.8 MMOL/L — SIGNIFICANT CHANGE UP (ref 3.5–5.3)
PROT SERPL-MCNC: 7 G/DL — SIGNIFICANT CHANGE UP (ref 6–8.3)
RBC # BLD: 4.93 M/UL — SIGNIFICANT CHANGE UP (ref 4.2–5.8)
RBC # FLD: 12.6 % — SIGNIFICANT CHANGE UP (ref 10.3–14.5)
SODIUM SERPL-SCNC: 140 MMOL/L — SIGNIFICANT CHANGE UP (ref 135–145)
WBC # BLD: 5.16 K/UL — SIGNIFICANT CHANGE UP (ref 3.8–10.5)
WBC # FLD AUTO: 5.16 K/UL — SIGNIFICANT CHANGE UP (ref 3.8–10.5)

## 2019-03-14 PROCEDURE — 99232 SBSQ HOSP IP/OBS MODERATE 35: CPT

## 2019-03-14 RX ORDER — DIPHENHYDRAMINE HCL 50 MG
50 CAPSULE ORAL AT BEDTIME
Qty: 0 | Refills: 0 | Status: DISCONTINUED | OUTPATIENT
Start: 2019-03-14 | End: 2019-03-22

## 2019-03-14 RX ADMIN — Medication 10 MILLIGRAM(S): at 09:02

## 2019-03-14 RX ADMIN — Medication 50 MILLIGRAM(S): at 03:54

## 2019-03-14 RX ADMIN — Medication 50 MILLIGRAM(S): at 22:26
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
## 2019-03-14 NOTE — PROGRESS NOTE ADULT - SUBJECTIVE AND OBJECTIVE BOX
Patient is a 33y old  Male who presents with a chief complaint of anemia    SUBJECTIVE / OVERNIGHT EVENTS: no events or complaints     ROS:  No HA/DZ  No Vision changes   No CP, SOB  No N/V/D  No Edema  No Rash  NO weakness, numbness    MEDICATIONS  (STANDING):  diphenhydrAMINE 50 milliGRAM(s) Oral at bedtime  FLUoxetine 10 milliGRAM(s) Oral daily    MEDICATIONS  (PRN):  hydrOXYzine hydrochloride 50 milliGRAM(s) Oral every 6 hours PRN Anxiety  LORazepam   Injectable 2 milliGRAM(s) IntraMuscular once PRN Agitation      T(C): 36.7 (03-14-19 @ 06:49)  HR: 90 (03-13-19 @ 22:00)  BP: 118/75 (03-13-19 @ 22:00)  RR: --  SpO2: --  CAPILLARY BLOOD GLUCOSE        I&O's Summary      PHYSICAL EXAM:  GENERAL: NAD, well-developed, AOx3  HEAD:  Atraumatic, Normocephalic  EYES: EOMI, PERRL, conjunctiva and sclera clear  NECK: Supple, No JVD  CHEST/LUNG: Clear to auscultation bilaterally  HEART: Regular rate and rhythm; No murmurs, rubs, or gallops, No Edema  ABDOMEN: Soft, Nontender, Nondistended; Bowel sounds present  EXTREMITIES:  2+ Peripheral Pulses, No clubbing, cyanosis  PSYCH: No SI/HI  NEUROLOGY: non-focal  SKIN: No rashes or lesions    LABS:                        14.2   5.16  )-----------( 272      ( 14 Mar 2019 09:31 )             42.3     03-14    140  |  101  |  15  ----------------------------<  52<L>  3.8   |  22  |  0.85    Ca    9.3      14 Mar 2019 09:31  Phos  3.5     03-14    TPro  7.0  /  Alb  4.5  /  TBili  0.8  /  DBili  x   /  AST  73<H>  /  ALT  92<H>  /  AlkPhos  56  03-14                  RADIOLOGY & ADDITIONAL TESTS:    Imaging Personally Reviewed:    Consultant(s) Notes Reviewed:      Care Discussed with Consultants/Other Providers: Psych - Dr Peterson

## 2019-03-15 PROCEDURE — 90853 GROUP PSYCHOTHERAPY: CPT

## 2019-03-15 PROCEDURE — 99231 SBSQ HOSP IP/OBS SF/LOW 25: CPT | Mod: 25

## 2019-03-15 RX ORDER — FLUOXETINE HCL 10 MG
10 CAPSULE ORAL ONCE
Qty: 0 | Refills: 0 | Status: COMPLETED | OUTPATIENT
Start: 2019-03-15 | End: 2019-03-15

## 2019-03-15 RX ORDER — FLUOXETINE HCL 10 MG
20 CAPSULE ORAL DAILY
Qty: 0 | Refills: 0 | Status: DISCONTINUED | OUTPATIENT
Start: 2019-03-15 | End: 2019-03-18

## 2019-03-15 RX ADMIN — Medication 10 MILLIGRAM(S): at 08:24

## 2019-03-15 RX ADMIN — Medication 50 MILLIGRAM(S): at 21:05

## 2019-03-15 RX ADMIN — Medication 10 MILLIGRAM(S): at 12:18

## 2019-03-16 PROCEDURE — 99232 SBSQ HOSP IP/OBS MODERATE 35: CPT

## 2019-03-16 RX ADMIN — Medication 20 MILLIGRAM(S): at 08:31

## 2019-03-17 PROCEDURE — 99232 SBSQ HOSP IP/OBS MODERATE 35: CPT

## 2019-03-17 RX ADMIN — Medication 50 MILLIGRAM(S): at 21:51

## 2019-03-17 RX ADMIN — Medication 20 MILLIGRAM(S): at 10:39

## 2019-03-18 PROCEDURE — 99231 SBSQ HOSP IP/OBS SF/LOW 25: CPT

## 2019-03-18 PROCEDURE — 90832 PSYTX W PT 30 MINUTES: CPT

## 2019-03-18 RX ORDER — FLUOXETINE HCL 10 MG
30 CAPSULE ORAL DAILY
Qty: 0 | Refills: 0 | Status: DISCONTINUED | OUTPATIENT
Start: 2019-03-18 | End: 2019-03-22

## 2019-03-18 RX ADMIN — Medication 20 MILLIGRAM(S): at 08:41

## 2019-03-18 RX ADMIN — Medication 50 MILLIGRAM(S): at 21:53

## 2019-03-19 PROCEDURE — 99231 SBSQ HOSP IP/OBS SF/LOW 25: CPT | Mod: 25

## 2019-03-19 RX ADMIN — Medication 30 MILLIGRAM(S): at 08:24

## 2019-03-20 PROCEDURE — 90853 GROUP PSYCHOTHERAPY: CPT

## 2019-03-20 RX ADMIN — Medication 30 MILLIGRAM(S): at 08:44

## 2019-03-20 RX ADMIN — Medication 50 MILLIGRAM(S): at 19:47

## 2019-03-21 PROCEDURE — 99231 SBSQ HOSP IP/OBS SF/LOW 25: CPT

## 2019-03-21 RX ADMIN — Medication 50 MILLIGRAM(S): at 21:28

## 2019-03-21 RX ADMIN — Medication 30 MILLIGRAM(S): at 08:57

## 2019-03-22 VITALS — TEMPERATURE: 98 F

## 2019-03-22 RX ORDER — HYDROXYZINE HCL 10 MG
1 TABLET ORAL
Qty: 56 | Refills: 0 | OUTPATIENT
Start: 2019-03-22 | End: 2019-04-04

## 2019-03-22 RX ORDER — LANOLIN ALCOHOL/MO/W.PET/CERES
1 CREAM (GRAM) TOPICAL
Qty: 30 | Refills: 0 | OUTPATIENT
Start: 2019-03-22 | End: 2019-04-20

## 2019-03-22 RX ORDER — FLUOXETINE HCL 10 MG
3 CAPSULE ORAL
Qty: 90 | Refills: 0 | OUTPATIENT
Start: 2019-03-22 | End: 2019-04-20

## 2019-03-22 RX ADMIN — Medication 30 MILLIGRAM(S): at 08:55

## 2019-04-09 RX ORDER — LANOLIN ALCOHOL/MO/W.PET/CERES
1 CREAM (GRAM) TOPICAL
Qty: 30 | Refills: 0 | OUTPATIENT
Start: 2019-04-09 | End: 2019-05-08

## 2019-04-09 RX ORDER — FLUOXETINE HCL 10 MG
3 CAPSULE ORAL
Qty: 90 | Refills: 0 | OUTPATIENT
Start: 2019-04-09 | End: 2019-05-08

## 2019-04-09 RX ORDER — HYDROXYZINE HCL 10 MG
1 TABLET ORAL
Qty: 56 | Refills: 0 | OUTPATIENT
Start: 2019-04-09 | End: 2019-04-22

## 2019-04-26 RX ORDER — FLUOXETINE HCL 10 MG
3 CAPSULE ORAL
Qty: 90 | Refills: 0
Start: 2019-04-26 | End: 2019-05-25

## 2019-04-26 RX ORDER — LANOLIN ALCOHOL/MO/W.PET/CERES
1 CREAM (GRAM) TOPICAL
Qty: 30 | Refills: 0
Start: 2019-04-26 | End: 2019-05-25

## 2019-04-26 RX ORDER — HYDROXYZINE HCL 10 MG
1 TABLET ORAL
Qty: 56 | Refills: 0
Start: 2019-04-26 | End: 2019-05-09

## 2019-07-29 NOTE — ED ADULT NURSE NOTE - TEMPLATE
Spoke with the patient, explained that Nicci from [facility] has been calling her to give her information about a motorized wheelchair.  Pt is very interested. Advised that I will call Nicci & have her call her back.  Gave the patient the 2 telephone numbers that she would be calling from & she said she will answer the phone.  If she doesn't recognize the telephone number she won't answer it.    Left a detailed message on Nicci's voicemail to call the patient & left the telephone number to call the patient.    Psych/Behavioral

## 2020-02-01 ENCOUNTER — EMERGENCY (EMERGENCY)
Facility: HOSPITAL | Age: 35
LOS: 1 days | Discharge: HOME | End: 2020-02-01
Attending: EMERGENCY MEDICINE
Payer: MEDICAID

## 2020-02-01 VITALS
TEMPERATURE: 98 F | WEIGHT: 175.05 LBS | HEIGHT: 67 IN | RESPIRATION RATE: 20 BRPM | SYSTOLIC BLOOD PRESSURE: 135 MMHG | DIASTOLIC BLOOD PRESSURE: 76 MMHG | OXYGEN SATURATION: 96 % | HEART RATE: 111 BPM

## 2020-02-01 PROCEDURE — 70160 X-RAY EXAM OF NASAL BONES: CPT | Mod: 26

## 2020-02-01 PROCEDURE — 99284 EMERGENCY DEPT VISIT MOD MDM: CPT | Mod: 25

## 2020-02-01 PROCEDURE — 70160 X-RAY EXAM OF NASAL BONES: CPT

## 2020-02-01 PROCEDURE — 12011 RPR F/E/E/N/L/M 2.5 CM/<: CPT

## 2020-02-01 PROCEDURE — 99285 EMERGENCY DEPT VISIT HI MDM: CPT | Mod: 25

## 2020-02-01 RX ORDER — IBUPROFEN 200 MG
600 TABLET ORAL ONCE
Refills: 0 | Status: COMPLETED | OUTPATIENT
Start: 2020-02-01 | End: 2020-02-01

## 2020-02-01 RX ADMIN — Medication 600 MILLIGRAM(S): at 14:29

## 2020-02-01 NOTE — ED STATDOCS - NSFOLLOWUPINSTRUCTIONS_ED_ALL_ED_FT
Keep stitches clean and dry  See your doctor in 5 days(Thursday) to get stitches out. If you have any difficulty you can return to the ER for evaluation  Over the counter acetaminophen or ibuprofen for pain. Two pills every 6 hours  Return immediately for redness, pus or swelling  Keflex one pill every 6 hours for ten days
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
## 2020-02-01 NOTE — ED STATDOCS - PATIENT PORTAL LINK FT
You can access the FollowMyHealth Patient Portal offered by [facility] by registering at the following website: [email or web address]. By joining [employer]’s FollowMyHealth portal, you will also be able to view your health information using other applications (apps) compatible with our system.
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
## 2020-02-10 ENCOUNTER — EMERGENCY (EMERGENCY)
Facility: HOSPITAL | Age: 35
LOS: 1 days | Discharge: HOME | End: 2020-02-10
Attending: STUDENT IN AN ORGANIZED HEALTH CARE EDUCATION/TRAINING PROGRAM
Payer: MEDICAID

## 2020-02-10 VITALS
RESPIRATION RATE: 20 BRPM | DIASTOLIC BLOOD PRESSURE: 50 MMHG | TEMPERATURE: 98 F | HEIGHT: 67 IN | SYSTOLIC BLOOD PRESSURE: 97 MMHG | HEART RATE: 59 BPM | WEIGHT: 179.9 LBS | OXYGEN SATURATION: 99 %

## 2020-02-10 PROCEDURE — 99282 EMERGENCY DEPT VISIT SF MDM: CPT

## 2020-02-10 NOTE — ED PROVIDER NOTE - ATTENDING CONTRIBUTION TO CARE
I performed a face to face history and physical exam of the patient and discussed their management with the resident/ACP. I reviewed the resident/ACP's note and agree with the documented findings and plan of care. I performed a face to face history and physical exam of the patient and discussed their management with the resident/ACP. I reviewed the resident/ACP's note and agree with the documented findings and plan of care.    pt here for suture removal. wound c/d/i. sutures removed.

## 2020-02-10 NOTE — ED PROVIDER NOTE - PATIENT PORTAL LINK FT
You can access the FollowMyHealth Patient Portal offered by [facility] by registering at the following website: [email or web address]. By joining [employer]’s FollowMyHealth portal, you will also be able to view your health information using other applications (apps) compatible with our system.

## 2020-02-10 NOTE — ED PROVIDER NOTE - PHYSICAL EXAMINATION
left forehead healing laceration/abrasions with 3 sutures in place. No erythema, sts or d/c.. 2 sutures to nasal bridge, well healed

## 2020-02-10 NOTE — ED PROVIDER NOTE - CARDIAC, MLM
detailed exam Normal rate, regular rhythm.  Heart sounds S1, S2.  No murmurs, rubs or gallops. details…

## 2020-02-10 NOTE — ED PROVIDER NOTE - NSFOLLOWUPINSTRUCTIONS_ED_ALL_ED_FT
1. TAKE ALL MEDICATIONS AS DIRECTED.  FOR PAIN YOU CAN TAKE IBUPROFEN (MOTRIN, ADVIL) OR ACETAMINOPHEN (TYLENOL) AS NEEDED, AS DIRECTED ON PACKAGING.  2. FOLLOW UP WITH ___PMD_______ AS DIRECTED.  YOU WERE GIVEN COPIES OF ALL LABS AND IMAGING RESULTS FROM YOUR ER VISIT--PLEASE TAKE THEM WITH YOU TO YOUR APPOINTMENT.  3. IF NEEDED, CALL [phone] TO FIND A PRIMARY CARE PHYSICIAN.  OR CALL [phone] TO MAKE AN APPOINTMENT WITH THE MEDICAL CLINIC.  4. RETURN TO THE ER FOR ANY WORSENING SYMPTOMS.    Suture/Staple Removal    After having your stitches or staples removed it is typical to have minor discomfort, swelling, or redness in the area. The wound is still healing so continue to protect it from injury. Keep the wound dry and if given creams, ointments, or medication, take as instructed to by your health care professional.    SEEK IMMEDIATE MEDICAL CARE IF YOU HAVE ANY OF THE FOLLOWING SYMPTOMS: increasing redness/swelling/pain in the wound, pus coming from the wound, bad smell coming from the wound, or fever.
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
## 2020-12-14 NOTE — ED PROVIDER NOTE - ATTESTATION, MLM
I have reviewed and confirmed nurses' notes for patient's medications, allergies, medical history, and surgical history. Hypoxia

## 2021-06-08 ENCOUNTER — APPOINTMENT (OUTPATIENT)
Dept: FAMILY MEDICINE | Facility: CLINIC | Age: 36
End: 2021-06-08
Payer: MEDICAID

## 2021-06-08 VITALS
OXYGEN SATURATION: 97 % | TEMPERATURE: 98.4 F | WEIGHT: 216 LBS | SYSTOLIC BLOOD PRESSURE: 118 MMHG | BODY MASS INDEX: 34.72 KG/M2 | HEIGHT: 66 IN | DIASTOLIC BLOOD PRESSURE: 82 MMHG | RESPIRATION RATE: 14 BRPM | HEART RATE: 86 BPM

## 2021-06-08 DIAGNOSIS — Z56.0 UNEMPLOYMENT, UNSPECIFIED: ICD-10-CM

## 2021-06-08 DIAGNOSIS — F19.90 OTHER PSYCHOACTIVE SUBSTANCE USE, UNSPECIFIED, UNCOMPLICATED: ICD-10-CM

## 2021-06-08 DIAGNOSIS — Z72.89 OTHER PROBLEMS RELATED TO LIFESTYLE: ICD-10-CM

## 2021-06-08 PROCEDURE — 99406 BEHAV CHNG SMOKING 3-10 MIN: CPT

## 2021-06-08 PROCEDURE — 99385 PREV VISIT NEW AGE 18-39: CPT | Mod: 25

## 2021-06-08 PROCEDURE — G0447 BEHAVIOR COUNSEL OBESITY 15M: CPT

## 2021-06-08 PROCEDURE — G0443: CPT

## 2021-06-08 RX ORDER — HALOPERIDOL 5 MG/1
5 TABLET ORAL
Refills: 0 | Status: ACTIVE | COMMUNITY

## 2021-06-08 RX ORDER — GABAPENTIN 100 MG/1
100 CAPSULE ORAL
Refills: 0 | Status: ACTIVE | COMMUNITY

## 2021-06-08 RX ORDER — OLANZAPINE 20 MG/1
TABLET ORAL
Refills: 0 | Status: ACTIVE | COMMUNITY

## 2021-06-08 RX ORDER — FENOFIBRATE 145 MG/1
TABLET ORAL
Refills: 0 | Status: ACTIVE | COMMUNITY

## 2021-06-08 RX ORDER — DOXEPIN HYDROCHLORIDE 75 MG/1
CAPSULE ORAL
Refills: 0 | Status: ACTIVE | COMMUNITY

## 2021-06-08 RX ORDER — CHROMIUM 200 MCG
TABLET ORAL
Refills: 0 | Status: ACTIVE | COMMUNITY

## 2021-06-08 RX ORDER — DIVALPROEX SODIUM 500 MG/1
500 TABLET, DELAYED RELEASE ORAL
Refills: 0 | Status: ACTIVE | COMMUNITY

## 2021-06-08 RX ORDER — LORAZEPAM 0.5 MG/1
0.5 TABLET ORAL
Refills: 0 | Status: ACTIVE | COMMUNITY

## 2021-06-08 SDOH — ECONOMIC STABILITY - INCOME SECURITY: UNEMPLOYMENT, UNSPECIFIED: Z56.0

## 2021-06-08 NOTE — HISTORY OF PRESENT ILLNESS
[FreeTextEntry1] : CPE [de-identified] : 36 y.o male with PmHx of tobacco used, alcohol used, drug used, obesity.  Here today for CPE, initial visit.   Last PE done several years ago.  Reported was hospitalized from April to May at [facility] for psych problems.   Reported feeling better mentally, taking the psych medications every day.  Following with psychiatrist.  Denies cp, palpitation, sob, dizziness, edema, n/v, headaches.

## 2021-06-08 NOTE — PLAN
[FreeTextEntry1] : CPE/Initial visit:  Done\par \par Obesity:  Discussed diet and exercise.  Discussed the risk of obesity and the health benefits of loosing weight.  Encouraged to eat smaller portions 3-4 times/day, keep food diary, weight self weekly, move around more.\par \par Tobacco used:  Smoking cessation counseling given.  Discussed the risk of smoking and the benefits of smoking cessation.  Encouraged to go to smoking cessation classes, Information given for classes.\par \par Drug used:  Drug cessation:  Discussed the risk of drug and the benefits of drug cessation.  Encouraged to go to drug cessation programs.  information given for programs.\par \par HM:  HM:  Discussed Life style modification, healthy diet, low salts, fats, sweets, less juices/sodas, butter, cheese, fried food.  More water, fruits vegetables.  Eat smaller portions 3-4 times per day, keep food diary, weight self weekly, move around more.  Exercise, walking 30-60 minutes per day.  Encouraged to sleep 8-9 hours per night, get plenty of rest.  Discussed dental hygiene, floss, brush after each meals, dental check every 6 months.  Annual eye check.  Encouraged frequent hands washing, wear mask, keep social distance.  Continue to f/u with specialists.  Discussed the importance of taking medications as ordered.  Lab requisition:  CBC with diff, cmp, lipids, tsh, hgbA1c, HIV.\par \par Depression:  Continue with psych medications, continue to f/u with psych/therapist.\par \par Follow up in 12-14 weeks or prn.\par

## 2021-06-08 NOTE — PHYSICAL EXAM
[Normal Sclera/Conjunctiva] : normal sclera/conjunctiva [PERRL] : pupils equal round and reactive to light [Normal] : affect was normal and insight and judgment were intact [de-identified] : wears glasses [de-identified] : L

## 2021-06-08 NOTE — HEALTH RISK ASSESSMENT
[Good] : ~his/her~  mood as  good [Intercurrent hospitalizations] : was admitted to the hospital  [] : Yes [30 or more] : 30 or more [Yes] : In the past 12 months have you used drugs other than those required for medical reasons? Yes [No falls in past year] : Patient reported no falls in the past year [0] : 1) Little interest or pleasure doing things: Not at all (0) [1] : 2) Feeling down, depressed, or hopeless for several days (1) [HIV Test offered] : HIV Test offered [Hepatitis C test declined] : Hepatitis C test declined [None] : None [With Family] : lives with family [# of Members in Household ___] :  household currently consist of [unfilled] member(s) [Unemployed] : unemployed [High School] : high school [Single] : single [# Of Children ___] : has [unfilled] children [Feels Safe at Home] : Feels safe at home [Fully functional (bathing, dressing, toileting, transferring, walking, feeding)] : Fully functional (bathing, dressing, toileting, transferring, walking, feeding) [Fully functional (using the telephone, shopping, preparing meals, housekeeping, doing laundry, using] : Fully functional and needs no help or supervision to perform IADLs (using the telephone, shopping, preparing meals, housekeeping, doing laundry, using transportation, managing medications and managing finances) [Reports changes in vision] : Reports changes in vision [Reports normal functional visual acuity (ie: able to read med bottle)] : Reports normal functional visual acuity [Smoke Detector] : smoke detector [Carbon Monoxide Detector] : carbon monoxide detector [Safety elements used in home] : safety elements used in home [Seat Belt] :  uses seat belt [Sunscreen] : uses sunscreen [With Patient/Caregiver] : With Patient/Caregiver [de-identified] : hospitalized for several weeks at [facility] for psychiatric problems. [de-identified] : reported no alcohol for 2 weeks [de-identified] : Reported no drugs for 2 weeks. [de-identified] : no [de-identified] : no [Change in mental status noted] : No change in mental status noted [Language] : denies difficulty with language [Behavior] : denies difficulty with behavior [Learning/Retaining New Information] : denies difficulty learning/retaining new information [Handling Complex Tasks] : denies difficulty handling complex tasks [Reasoning] : denies difficulty with reasoning [Spatial Ability and Orientation] : denies difficulty with spatial ability and orientation [Sexually Active] : not sexually active [High Risk Behavior] : no high risk behavior [Reports changes in hearing] : Reports no changes in hearing [Reports changes in dental health] : Reports no changes in dental health [Guns at Home] : no guns at home [Travel to Developing Areas] : does not  travel to developing areas [TB Exposure] : is not being exposed to tuberculosis [Caregiver Concerns] : does not have caregiver concerns [de-identified] : living with mother and father [de-identified] : wears glasses [AdvancecareDate] : 06/08/21

## 2021-06-08 NOTE — REVIEW OF SYSTEMS
[Fever] : no fever [Chills] : no chills [Fatigue] : no fatigue [Hot Flashes] : no hot flashes [Night Sweats] : no night sweats [Recent Change In Weight] : ~T no recent weight change [Discharge] : no discharge [Pain] : no pain [Redness] : no redness [Dryness] : no dryness [Vision Problems] : no vision problems [Itching] : no itching [Earache] : no earache [Hearing Loss] : no hearing loss [Nosebleeds] : no nosebleeds [Postnasal Drip] : no postnasal drip [Nasal Discharge] : no nasal discharge [Sore Throat] : no sore throat [Hoarseness] : no hoarseness [Chest Pain] : no chest pain [Palpitations] : no palpitations [Claudication] : no  leg claudication [Lower Ext Edema] : no lower extremity edema [Orthopena] : no orthopnea [Paroxysmal Nocturnal Dyspnea] : no paroxysmal nocturnal dyspnea [Shortness Of Breath] : no shortness of breath [Wheezing] : no wheezing [Cough] : no cough [Dyspnea on Exertion] : not dyspnea on exertion [Abdominal Pain] : no abdominal pain [Nausea] : no nausea [Constipation] : no constipation [Diarrhea] : no diarrhea [Vomiting] : no vomiting [Heartburn] : no heartburn [Melena] : no melena [Dysuria] : no dysuria [Incontinence] : no incontinence [Hesitancy] : no hesitancy [Nocturia] : no nocturia [Hematuria] : no hematuria [Frequency] : no frequency [Impotence] : no impotency [Poor Libido] : libido not poor [Joint Pain] : no joint pain [Joint Stiffness] : no joint stiffness [Muscle Pain] : no muscle pain [Muscle Weakness] : no muscle weakness [Back Pain] : no back pain [Joint Swelling] : no joint swelling [Itching] : no itching [Mole Changes] : no mole changes [Nail Changes] : no nail changes [Hair Changes] : no hair changes [Skin Rash] : no skin rash [Headache] : no headache [Dizziness] : no dizziness [Fainting] : no fainting [Confusion] : no confusion [Unsteady Walk] : no ataxia [Memory Loss] : no memory loss [Suicidal] : not suicidal [Insomnia] : no insomnia [Anxiety] : no anxiety [Depression] : no depression [Easy Bleeding] : no easy bleeding [Easy Bruising] : no easy bruising [Swollen Glands] : no swollen glands

## 2021-06-08 NOTE — COUNSELING
[Risk of tobacco use and health benefits of smoking cessation discussed] : Risk of tobacco use and health benefits of smoking cessation discussed [Cessation strategies including cessation program discussed] : Cessation strategies including cessation program discussed [Use of nicotine replacement therapies and other medications discussed] : Use of nicotine replacement therapies and other medications discussed [Encouraged to pick a quit date and identify support needed to quit] : Encouraged to pick a quit date and identify support needed to quit [Tobacco Use Cessation Intermediate Greater Than 3 Minutes Up to 10 Minutes] : Tobacco Use Cessation Intermediate Greater Than 3 Minutes Up to 10 Minutes [AUDIT-C Screening administered and reviewed] : AUDIT-C Screening administered and reviewed [Hazards of at-risk alcohol use discussed] : Hazards of at-risk alcohol use discussed [Strategies to reduce or eliminate alcohol use discussed] : Strategies to reduce or eliminate alcohol use discussed [Support options provided] : Support options provided [Potential consequences of obesity discussed] : Potential consequences of obesity discussed [Benefits of weight loss discussed] : Benefits of weight loss discussed [Structured Weight Management Program suggested:] : Structured weight management program suggested [Encouraged to maintain food diary] : Encouraged to maintain food diary [Encouraged to increase physical activity] : Encouraged to increase physical activity [Encouraged to use exercise tracking device] : Encouraged to use exercise tracking device [Target Wt Loss Goal ___] : Weight Loss Goals: Target weight loss goal [unfilled] lbs [Decrease Portions] : decrease portions [____ min/wk Activity] : [unfilled] min/wk activity [Keep Food Diary] : keep food diary [Patient motivation] : Patient motivation [Needs reinforcement, provided] : Patient needs reinforcement on understanding of lifestyle changes and steps needed to achieve self management goal; reinforcement was provided [Behavioral health counseling provided] : Behavioral health counseling provided [Sleep ___ hours/day] : Sleep [unfilled] hours/day [Engage in a relaxing activity] : Engage in a relaxing activity [Plan in advance] : Plan in advance [Willing to Quit Smoking] : Not willing to quit smoking [FreeTextEntry2] : No alcohol for 2 weeks per patient [FreeTextEntry1] : 15 [Weigh Self Weekly] : weigh self weekly [FreeTextEntry4] : 15

## 2021-07-20 ENCOUNTER — APPOINTMENT (OUTPATIENT)
Dept: FAMILY MEDICINE | Facility: CLINIC | Age: 36
End: 2021-07-20
Payer: MEDICAID

## 2021-07-20 VITALS
OXYGEN SATURATION: 97 % | HEIGHT: 66 IN | TEMPERATURE: 98.4 F | HEART RATE: 82 BPM | WEIGHT: 228 LBS | RESPIRATION RATE: 14 BRPM | BODY MASS INDEX: 36.64 KG/M2

## 2021-07-20 VITALS — SYSTOLIC BLOOD PRESSURE: 116 MMHG | DIASTOLIC BLOOD PRESSURE: 76 MMHG

## 2021-07-20 DIAGNOSIS — E78.5 HYPERLIPIDEMIA, UNSPECIFIED: ICD-10-CM

## 2021-07-20 DIAGNOSIS — E66.9 OBESITY, UNSPECIFIED: ICD-10-CM

## 2021-07-20 DIAGNOSIS — Z72.0 TOBACCO USE: ICD-10-CM

## 2021-07-20 DIAGNOSIS — Z00.00 ENCOUNTER FOR GENERAL ADULT MEDICAL EXAMINATION W/OUT ABNORMAL FINDINGS: ICD-10-CM

## 2021-07-20 PROCEDURE — 99214 OFFICE O/P EST MOD 30 MIN: CPT | Mod: 25

## 2021-07-20 PROCEDURE — G0447 BEHAVIOR COUNSEL OBESITY 15M: CPT | Mod: 59

## 2021-07-20 PROCEDURE — 99406 BEHAV CHNG SMOKING 3-10 MIN: CPT

## 2021-07-20 NOTE — COUNSELING
[Behavioral health counseling provided] : Behavioral health counseling provided [Sleep ___ hours/day] : Sleep [unfilled] hours/day [Engage in a relaxing activity] : Engage in a relaxing activity [Plan in advance] : Plan in advance [Risk of tobacco use and health benefits of smoking cessation discussed] : Risk of tobacco use and health benefits of smoking cessation discussed [Cessation strategies including cessation program discussed] : Cessation strategies including cessation program discussed [Use of nicotine replacement therapies and other medications discussed] : Use of nicotine replacement therapies and other medications discussed [Encouraged to pick a quit date and identify support needed to quit] : Encouraged to pick a quit date and identify support needed to quit [Willing to Quit Smoking] : Willing to quit smoking [Tobacco Use Cessation Intermediate Greater Than 3 Minutes Up to 10 Minutes] : Tobacco Use Cessation Intermediate Greater Than 3 Minutes Up to 10 Minutes [Potential consequences of obesity discussed] : Potential consequences of obesity discussed [Benefits of weight loss discussed] : Benefits of weight loss discussed [Structured Weight Management Program suggested:] : Structured weight management program suggested [Encouraged to maintain food diary] : Encouraged to maintain food diary [Encouraged to increase physical activity] : Encouraged to increase physical activity [Encouraged to use exercise tracking device] : Encouraged to use exercise tracking device [Target Wt Loss Goal ___] : Weight Loss Goals: Target weight loss goal [unfilled] lbs [Weigh Self Weekly] : weigh self weekly [Decrease Portions] : decrease portions [____ min/wk Activity] : [unfilled] min/wk activity [Keep Food Diary] : keep food diary [Patient motivation] : Patient motivation [Needs reinforcement, provided] : Patient needs reinforcement on understanding of lifestyle changes and steps needed to achieve self management goal; reinforcement was provided [FreeTextEntry1] : 5 [FreeTextEntry4] : 15

## 2021-07-20 NOTE — PLAN
Normal vision: sees adequately in most situations; can see medication labels, newsprint
[FreeTextEntry1] : Dyslipidemia:  Does not want to start on medications at this time.  would like to try life style modification.  Discussed diet and exercise.  Repeat labs in 8 to 10 weeks,  Explained to pt if no improvement, needs to start on cholesterol medications.\par \par Obesity:  Gained 12 lbs from 6/8/21 to 7/20/21.  Discussed diet and exercise.  Discussed the risk of obesity and the health benefits of loosing weight.  Encouraged to eat smaller portions 3-4 times/day, keep food diary, weight self weekly, move around more.\par \par Tobacco used:  Smoking cessation counseling given.  Discussed the risk of smoking and the benefits of smoking cessation.  Encouraged to go to smoking cessation classes, Information given for classes.\par \par Elevated BUN and creat:  Repeat labs:  BUN, Creatine,  if labs abnormal, Nephrology consult for eval and treatment.  \par HM:  Discussed Life style modification, healthy diet, low salts, fats, sweets, less juices/sodas, butter, cheese, fried food.  More water, fruits vegetables.  Eat smaller portions 3-4 times per day, keep food diary, weight self weekly, move around more.  Exercise, walking 30-60 minutes per day.  Encouraged to sleep 8-9 hours per night, get plenty of rest.  Discussed dental hygiene, floss, brush after each meals, dental check every 6 months.  Annual eye check.  Encouraged frequent hands washing, wear mask, keep social distance.  Continue to f/u with specialists.  Discussed the importance of taking medications as ordered.  Lab requisition: cmp, lipids.\par \par Depression:  Continue with psych medications, continue to f/u with psych/therapist.\par \par Follow up in 12-14 weeks or prn.\par \par \par

## 2021-07-20 NOTE — HEALTH RISK ASSESSMENT
[] : Yes [15-19] : 15-19 [No falls in past year] : Patient reported no falls in the past year [0] : 1) Little interest or pleasure doing things: Not at all (0) [de-identified] : not exercising much [1] : 2) Feeling down, depressed, or hopeless for several days (1) [With Patient/Caregiver] : , with patient/caregiver [de-identified] : trying to watch diet [AdvancecareDate] : 7/20/21

## 2021-07-20 NOTE — HISTORY OF PRESENT ILLNESS
[FreeTextEntry1] : Follow up visit [de-identified] : 36 y.o male with PMHx of Tobacco used, alcohol and drug used, depression.  Here today for f/u visit and to discuss labs.  Reported feeling better, had f/u with psych, medications readjusted and some meds changed.  Feeling better now.  Denies cp, palpitation, sob, dizziness, edema, n/v

## 2021-12-21 NOTE — ED PROVIDER NOTE - NSTIMEPROVIDERCAREINITIATE_GEN_ER
Appt made
Patient requesting order for left shoulder x-ray - do you need to see her or refer her to ortho or would you order x-ray per her request  - please advise    Dr. Cristy Watson, Good morning. I'm wondering if you can order an X-ray from my left shoulder. First I just thought it was my arm and fibromyalgia and muscles but continue to get worst.  I'm not sure what I did to hurt it. Maybe picking up boxes but I don't remember doing anything specific I just thought I was having muscle issues and was waiting on my next massage which is Thursday. But it's worse when I lay down and I can't hardly move my left shoulder all the way to my elbow. That's really bad and my forearm is like a little muscle there. it gets better when I'm standing with My arms straight. I can still do a little bit of exercises with it but it's like it gives out. It almost locks up when I turn in bed down to my elbow. I can come in tomorrow. I'm out of town today. I'm a little scared because it is progressively getting worse daily.  Thank you Jasbir Velasco
10-Feb-2018 03:05

## 2022-06-30 ENCOUNTER — EMERGENCY (EMERGENCY)
Facility: HOSPITAL | Age: 37
LOS: 1 days | Discharge: HOME | End: 2022-06-30
Attending: EMERGENCY MEDICINE
Payer: MEDICAID

## 2022-06-30 VITALS
TEMPERATURE: 98 F | HEIGHT: 67 IN | SYSTOLIC BLOOD PRESSURE: 111 MMHG | RESPIRATION RATE: 18 BRPM | DIASTOLIC BLOOD PRESSURE: 73 MMHG | HEART RATE: 101 BPM | OXYGEN SATURATION: 97 %

## 2022-06-30 PROCEDURE — 99285 EMERGENCY DEPT VISIT HI MDM: CPT | Mod: 25

## 2022-06-30 PROCEDURE — 99283 EMERGENCY DEPT VISIT LOW MDM: CPT

## 2022-06-30 NOTE — ED PROVIDER NOTE - NSICDXPASTMEDICALHX_GEN_ALL_CORE_FT
PAST MEDICAL HISTORY:  Anxiety     No pertinent past medical history     Post traumatic stress disorder (PTSD)

## 2022-06-30 NOTE — ED ADULT NURSE NOTE - NSIMPLEMENTINTERV_GEN_ALL_ED
Implemented All Fall Risk Interventions:  [place] to call system. Call bell, personal items and telephone within reach. Instruct patient to call for assistance. Room bathroom lighting operational. Non-slip footwear when patient is off stretcher. Physically safe environment: no spills, clutter or unnecessary equipment. Stretcher in lowest position, wheels locked, appropriate side rails in place. Provide visual cue, wrist band, yellow gown, etc. Monitor gait and stability. Monitor for mental status changes and reorient to person, place, and time. Review medications for side effects contributing to fall risk. Reinforce activity limits and safety measures with patient and family.

## 2022-06-30 NOTE — ED PROVIDER NOTE - PATIENT PORTAL LINK FT
stretcher You can access the FollowMyHealth Patient Portal offered by [facility] by registering at the following website: [email or web address]. By joining [employer]’s FollowMyHealth portal, you will also be able to view your health information using other applications (apps) compatible with our system. [email or web address]

## 2022-06-30 NOTE — ED PROVIDER NOTE - CLINICAL SUMMARY MEDICAL DECISION MAKING FREE TEXT BOX
Presented intoxicated with no acute complaints, observed overnight until returned to normal mental status to ensure safe dc. Has depressive symptoms but no suicidality or concern for self harm.

## 2022-06-30 NOTE — ED ADULT TRIAGE NOTE - CHIEF COMPLAINT QUOTE
BIBEMS from home reporting drinking one pint of vodka and his father "not liking that he is a drunk a**hole." Patient denies drinking daily, on Klonopin for anxiety. Patient denies pain, fall, A/Ox4. Calm and cooperative. Patient
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
## 2022-07-01 VITALS
TEMPERATURE: 98 F | RESPIRATION RATE: 17 BRPM | SYSTOLIC BLOOD PRESSURE: 113 MMHG | DIASTOLIC BLOOD PRESSURE: 76 MMHG | HEART RATE: 91 BPM | OXYGEN SATURATION: 96 %

## 2022-08-02 NOTE — H&P ADULT - GLASGOW COMA SCALE: BEST VERBAL RESPONSE, MLM
School Note:    The patient listed above is under my care  They are being discharged from the hospital after undergoing surgery for scoliosis  They should be excused from school for 4-6 weeks following surgery  If the parents request, they should be provided accommodations for virtual schooling  Keep in mind they will not be able to complete large assignments for at least 2-3 weeks after surgery  Per parent discretion, they may return to school as early as 4 weeks postoperatively with the following accommodations between postoperative weeks 4-6:  Option for a shortened school day and/or half-day between 4-6 weeks after surgery  Allow breaks from sitting to stand / stretch in the back of the room  Elevator access (if one is present at the school)  Arrange for child to leave class 5-10 minutes early and transition safely  Provide two sets of books (one for school and one for home)  Light backpack  (5-10-pound weight limit)    The child should be excused from physical education for 3 months after surgery  The child should be excused from sports until cleared by their surgeon  Post-Operative Clinic Visits:    If not already scheduled, call the orthopedic clinic to schedule your postoperative appointment for 2 weeks after surgery  Postoperative visits are usually 2 weeks, 6 weeks, 3 months, and 6 months after surgery - then yearly  At each visit starting at 6 weeks, you will have x-rays taken of your back to check healing and the position of your hardware  If possible, schedule a morning visit to avoid longer X-ray wait times  Dressings / Incision:    Can shower 7 days after surgery  Do not submerge the incision in tub water  Remove any non-water proof bandage not supplied by [facility] prior to the shower  If the bandage is water proof you may leave it on and remove after shower, pat skin dry, then reapply new dressing  Leave Steri-Strips on  Do not remove    Steri-Strips should fall off on their own  May need chair in shower to sit on for the first shower  Do not sit or stand with the shower head spraying directly onto the incision  The heat from the water may cause the dissolvable stitches under the skin to melt too soon  Do not scrub the incision  Let soapy water run down then rinse with water  Gently pat dry  There will be a small dressing/scab to the right of your incision  This is where the drainage tube was removed  You can cover this with a bandage or a bandaid  If this scab comes off by either accidentally scratching it or after the shower there is a possibility of a gush of clear brownish to clear slightly tinged yellowish fluid that may come out of the drain site  It may continue to drain for 24-36 hours, if this occurs cover with 4x4 gauze that should be folded into a quarter size then cover with a small bandage  The dressing may need to be changed several times a day if dressing becomes soiled  May remove once drainage stops  This does not always happen but you should be aware if it occurs  Call the clinic nurse if you notice any drainage from your incision (white, yellow, or green), incision comes open, or you develop a fever over 100 1 degrees F  Special Purple Dressing (Incisional VAC): If you have the long purple dressing with the tube, remember to plug in and charge the small purse-size machine occasionally  The black charging cord plugs into the bottom of the machine  This machine will automatically turn off 7 days after it was placed  For example, if you had the dressing placed during a Tuesday surgery, the machine will automatically turn off sometime the following Tuesday  There are lights on the power button that denote how many days are left  Green lights denote days and when the yellow light appears on the power button [place] it means hours         When 7 days have passed and the machine shuts off you can remove the dressing, shower, then put on the Mepilex dressings supplied to you when you left the hospital (they look like long waterproof bandaids)  Please clean your hands before removing the patient's dressing  Remove the borders of the dressing carefully - it is VERY sticky! Remove the dressing from top to bottom  When you get the very top portion off the skin, use finger pressure on the skin just above the sticky tape on each side to ease removal  Call the office if there are any concerns about the incision  If there are concerns, it also helps to take pictures of the incision and send them via [employer]  If the machine starts beeping or has issues while it's on, you may choose to troubleshoot based on the panel or just turn it off  You can leave the dressing with the tube on or remove the purple dressing and use the Mepilex dressings  Call the office if there are any questions or concerns about the incision  General Instructions:    Some patients experience a temporary numb sensation on the outer thigh post operatively  This occurs from pressure on the femoral nerve from lying on the operating room table during surgery  When the femoral nerve recovers or wakes up it causes a tingling sensation, this is normal  The tingling sensation may take several weeks to resolve  Walk regularly as tolerated, this will help with achiness and promote good gastrointestinal motility (bowel function) related to constipation  Eat healthy  Drink plenty of water often and include fiber in your diet to prevent constipation such as fruit, vegetables, salads, bran cereal or bran muffins are examples  Appetite will be decreased for several weeks after surgery  Five small meals are recommended  The patient may graze for the next several weeks, this is normal   A supply of pain medication will be given for home use at the time of discharge from the hospital  Take your pain medicine as needed for pain as directed   May supplement ibuprofen and/or Tylenol every 6 hours as needed for pain  Take your medications with food to prevent an upset stomach  Iron can help recover blood values  Foods rich in iron are red meat, egg yolks, dark  leafy greens (spinach, collards), dried fruit (prunes, raisins), iron-enriched cereals and grains (check the labels), mollusks (oysters, clams, scallops), turkey or chicken giblets, beans, lentils, chick peas and soybeans, liver, and artichokes to list a few  If you eat iron-rich foods along with foods that provide plenty of vitamin C (orange juice, oranges, and grapefruit), the body can better absorb the iron  Allowed to ride in the car  Allowed to climb stairs (with assistance if needed)  Allowed to lift up to 5 lbs  Example: gallon of milk is 9 5 pounds   Rest often  Listen to your back  No bending and twisting at waist   No housework  No driving  No sports (no P E)  Please do not hesitate to call the clinic nurses if you have any questions or concerns when you are home recovering  ACTIVITY 1 week 1 month 3 months 6 months  Shower Yes     Walking Yes     Swimming No Yes    Lifting 10-20 lbs  (book bag) No Yes    Light shoulder/arm exercise No Yes    Driving No Yes    School No Yes    Treadmill/stationary bike No Yes    Dance / Yoga No when comfortable   Bicycling No No Yes   Light jogging No No Yes   Easy gym class No No Yes   Non-contact sports No No Yes   Skating No No Yes   Lifting more than 20 lbs   No No Yes   Horse riding - no jumping No No Yes   Surfing No No Yes   Skiing - water & snow No No No Yes  Bowling No No No Yes  Amusement park rides No No No Yes  Gymnastics No No No Yes  Parachuting No No No Yes  Dirt bike racing No No No Yes  Contact sports No No No Yes  Rollerblading No No No Yes  Skateboarding No No No Yes  Snowboarding              No No No Yes (V4) confused

## 2022-08-21 ENCOUNTER — EMERGENCY (EMERGENCY)
Facility: HOSPITAL | Age: 37
LOS: 1 days | Discharge: HOME | End: 2022-08-21
Attending: STUDENT IN AN ORGANIZED HEALTH CARE EDUCATION/TRAINING PROGRAM
Payer: MEDICAID

## 2022-08-21 VITALS
OXYGEN SATURATION: 97 % | DIASTOLIC BLOOD PRESSURE: 87 MMHG | WEIGHT: 220.02 LBS | HEART RATE: 87 BPM | SYSTOLIC BLOOD PRESSURE: 132 MMHG | TEMPERATURE: 98 F | HEIGHT: 67 IN | RESPIRATION RATE: 18 BRPM

## 2022-08-21 LAB
AMPHET UR-MCNC: NEGATIVE — SIGNIFICANT CHANGE UP
ANION GAP SERPL CALC-SCNC: 14 MMOL/L — SIGNIFICANT CHANGE UP (ref 5–17)
APAP SERPL-MCNC: <3 UG/ML — LOW (ref 10–26)
APPEARANCE UR: CLEAR — SIGNIFICANT CHANGE UP
BARBITURATES UR SCN-MCNC: NEGATIVE — SIGNIFICANT CHANGE UP
BASOPHILS # BLD AUTO: 0.02 K/UL — SIGNIFICANT CHANGE UP (ref 0–0.2)
BASOPHILS NFR BLD AUTO: 0.2 % — SIGNIFICANT CHANGE UP (ref 0–2)
BENZODIAZ UR-MCNC: NEGATIVE — SIGNIFICANT CHANGE UP
BILIRUB UR-MCNC: NEGATIVE — SIGNIFICANT CHANGE UP
BUN SERPL-MCNC: 13.5 MG/DL — SIGNIFICANT CHANGE UP (ref 8–20)
CALCIUM SERPL-MCNC: 9.5 MG/DL — SIGNIFICANT CHANGE UP (ref 8.4–10.5)
CHLORIDE SERPL-SCNC: 100 MMOL/L — SIGNIFICANT CHANGE UP (ref 98–107)
CO2 SERPL-SCNC: 22 MMOL/L — SIGNIFICANT CHANGE UP (ref 22–29)
COCAINE METAB.OTHER UR-MCNC: NEGATIVE — SIGNIFICANT CHANGE UP
COLOR SPEC: YELLOW — SIGNIFICANT CHANGE UP
CREAT SERPL-MCNC: 0.68 MG/DL — SIGNIFICANT CHANGE UP (ref 0.5–1.3)
DIFF PNL FLD: NEGATIVE — SIGNIFICANT CHANGE UP
EGFR: 123 ML/MIN/1.73M2 — SIGNIFICANT CHANGE UP
EOSINOPHIL # BLD AUTO: 0.02 K/UL — SIGNIFICANT CHANGE UP (ref 0–0.5)
EOSINOPHIL NFR BLD AUTO: 0.2 % — SIGNIFICANT CHANGE UP (ref 0–6)
ETHANOL SERPL-MCNC: <10 MG/DL — SIGNIFICANT CHANGE UP (ref 0–9)
GLUCOSE SERPL-MCNC: 113 MG/DL — HIGH (ref 70–99)
GLUCOSE UR QL: NEGATIVE MG/DL — SIGNIFICANT CHANGE UP
HCT VFR BLD CALC: 40.4 % — SIGNIFICANT CHANGE UP (ref 39–50)
HGB BLD-MCNC: 14.3 G/DL — SIGNIFICANT CHANGE UP (ref 13–17)
IMM GRANULOCYTES NFR BLD AUTO: 0.3 % — SIGNIFICANT CHANGE UP (ref 0–1.5)
KETONES UR-MCNC: ABNORMAL
LEUKOCYTE ESTERASE UR-ACNC: NEGATIVE — SIGNIFICANT CHANGE UP
LYMPHOCYTES # BLD AUTO: 1.38 K/UL — SIGNIFICANT CHANGE UP (ref 1–3.3)
LYMPHOCYTES # BLD AUTO: 15.4 % — SIGNIFICANT CHANGE UP (ref 13–44)
MCHC RBC-ENTMCNC: 28.9 PG — SIGNIFICANT CHANGE UP (ref 27–34)
MCHC RBC-ENTMCNC: 35.4 GM/DL — SIGNIFICANT CHANGE UP (ref 32–36)
MCV RBC AUTO: 81.8 FL — SIGNIFICANT CHANGE UP (ref 80–100)
METHADONE UR-MCNC: NEGATIVE — SIGNIFICANT CHANGE UP
MONOCYTES # BLD AUTO: 0.41 K/UL — SIGNIFICANT CHANGE UP (ref 0–0.9)
MONOCYTES NFR BLD AUTO: 4.6 % — SIGNIFICANT CHANGE UP (ref 2–14)
NEUTROPHILS # BLD AUTO: 7.09 K/UL — SIGNIFICANT CHANGE UP (ref 1.8–7.4)
NEUTROPHILS NFR BLD AUTO: 79.3 % — HIGH (ref 43–77)
NITRITE UR-MCNC: NEGATIVE — SIGNIFICANT CHANGE UP
OPIATES UR-MCNC: NEGATIVE — SIGNIFICANT CHANGE UP
PCP SPEC-MCNC: SIGNIFICANT CHANGE UP
PCP UR-MCNC: NEGATIVE — SIGNIFICANT CHANGE UP
PH UR: 6 — SIGNIFICANT CHANGE UP (ref 5–8)
PLATELET # BLD AUTO: 300 K/UL — SIGNIFICANT CHANGE UP (ref 150–400)
POTASSIUM SERPL-MCNC: 3.5 MMOL/L — SIGNIFICANT CHANGE UP (ref 3.5–5.3)
POTASSIUM SERPL-SCNC: 3.5 MMOL/L — SIGNIFICANT CHANGE UP (ref 3.5–5.3)
PROT UR-MCNC: NEGATIVE — SIGNIFICANT CHANGE UP
RBC # BLD: 4.94 M/UL — SIGNIFICANT CHANGE UP (ref 4.2–5.8)
RBC # FLD: 13 % — SIGNIFICANT CHANGE UP (ref 10.3–14.5)
SALICYLATES SERPL-MCNC: <0.6 MG/DL — LOW (ref 10–20)
SODIUM SERPL-SCNC: 136 MMOL/L — SIGNIFICANT CHANGE UP (ref 135–145)
SP GR SPEC: 1.02 — SIGNIFICANT CHANGE UP (ref 1.01–1.02)
THC UR QL: POSITIVE
UROBILINOGEN FLD QL: NEGATIVE MG/DL — SIGNIFICANT CHANGE UP
WBC # BLD: 8.95 K/UL — SIGNIFICANT CHANGE UP (ref 3.8–10.5)
WBC # FLD AUTO: 8.95 K/UL — SIGNIFICANT CHANGE UP (ref 3.8–10.5)

## 2022-08-21 PROCEDURE — 99285 EMERGENCY DEPT VISIT HI MDM: CPT

## 2022-08-21 PROCEDURE — 93010 ELECTROCARDIOGRAM REPORT: CPT

## 2022-08-21 NOTE — ED PROVIDER NOTE - CLINICAL SUMMARY MEDICAL DECISION MAKING FREE TEXT BOX
Concern for decompensated bipolar with psychosis, required involuntary meds for agitation, will hold for assessment by psychiatry for disposition planning.

## 2022-08-21 NOTE — ED PROVIDER NOTE - CROS ED PSYCH ALL NEG
History  Chief Complaint   Patient presents with    Rectal Bleeding     Pt reports hx of hemmrhoids  Pt reports having a colonoscopy on tuesday   Pt states even prior to the colonoscopy she was having a lot of bright red blood in stool with clots  pt reports pain with bowel movements      A 27-year-old female with past medical history of anemia; presents with bright red blood per rectum  Patient states she has been having bloody stools for the past several weeks and underwent a colonoscopy and endoscopy on 2/11  Patient was diagnosed with several large hemorrhoids, and was referred to Colorectal   Patient does have an appointment with colorectal surgery on March 3rd  Patient presents today as she continues to bleed, and would like it to stop  Patient has not tried any medications for her bleeding hemorrhoids  Patient does complain of pain with bowel movements however denies abdominal pain  Patient does also complain of chest pain, which began last evening  Patient states it occurs over the midsternal region and left chest   Pain is nonradiating  Patient denies associated shortness of breath  Patient otherwise denies fever, chills, abdominal pain, nausea, vomiting, diarrhea, dysuria, hematuria, peripheral edema and rashes  A/P:  Bright red blood per rectum, likely secondary to recently diagnosed hemorrhoids  Given patient's history of anemia, will check CBC  Will plan to start Proctofoam for symptomatic relief  Chest pain, constant since last evening  EKG is within normal limits  Possibly related to anemia vs anxiety, lower suspicion for ACS however given that symptoms have been constant since last night will obtain a one time troponin  History provided by:  Patient and medical records    Prior to Admission Medications   Prescriptions Last Dose Informant Patient Reported? Taking?    Multiple Vitamin (MULTIVITAMIN) tablet   Yes No   Sig: Take 1 tablet by mouth daily   ferrous gluconate (FERGON) 324 mg tablet   No No   Sig: Take 1 tablet (324 mg total) by mouth 2 (two) times a day before meals      Facility-Administered Medications: None       Past Medical History:   Diagnosis Date    Anemia     iron def    History of transfusion     12/2019    Irritable bowel syndrome        Past Surgical History:   Procedure Laterality Date    COLONOSCOPY      EXPLORATORY LAPAROTOMY      FL COLONOSCOPY FLX DX W/COLLJ SPEC WHEN PFRMD N/A 8/1/2018    Procedure: COLONOSCOPY;  Surgeon: Nayeli Perez MD;  Location: [facility]; Service: Colorectal       Family History   Problem Relation Age of Onset    Diabetes Mother     HIV Father      I have reviewed and agree with the history as documented  Social History     Tobacco Use    Smoking status: Never Smoker    Smokeless tobacco: Never Used   Substance Use Topics    Alcohol use: No    Drug use: No       Review of Systems   Cardiovascular: Positive for chest pain  Gastrointestinal: Positive for blood in stool  All other systems reviewed and are negative        Physical Exam  Physical Exam  General Appearance: alert and oriented, nad, non toxic appearing  Skin:  Warm, dry, intact  HEENT: atraumatic, normocephalic  Neck: Supple, trachea midline  Cardiac: RRR; no murmurs, rub, gallops  Pulmonary: lungs CTAB; no wheezes, rales, rhonchi  Gastrointestinal: abdomen soft, nontender, nondistended; no guarding or rebound tenderness; good bowel sounds, no mass or bruits  Extremities:  no pedal edema, 2+ pulses; no calf tenderness, no clubbing, no cyanosis  Neuro:  no focal motor or sensory deficits, CN 2-12 grossly intact  Psych:  Normal mood and affect, normal judgement and insight      Vital Signs  ED Triage Vitals   Temperature Pulse Respirations Blood Pressure SpO2   02/14/20 0802 02/14/20 0802 02/14/20 0802 02/14/20 0802 02/14/20 0802   98 4 °F (36 9 °C) 99 18 117/81 100 %      Temp src Heart Rate Source Patient Position - Orthostatic VS BP Location FiO2 (%)   -- 02/14/20 1001 02/14/20 1001 02/14/20 1001 --    Monitor Lying Right arm       Pain Score       02/14/20 1001       6           Vitals:    02/14/20 0830 02/14/20 0845 02/14/20 0945 02/14/20 1001   BP:    117/65   Pulse: 96 92 90 84   Patient Position - Orthostatic VS:    Lying         Visual Acuity      ED Medications  Medications - No data to display    Diagnostic Studies  Results Reviewed     Procedure Component Value Units Date/Time    Troponin I [[id number]]  (Normal) Collected:  02/14/20 0850    Lab Status:  Final result Specimen:  Blood from Arm, Left Updated:  02/14/20 0925     Troponin I <0 02 ng/mL     Basic metabolic panel [[id number]] Collected:  02/14/20 0840    Lab Status:  Final result Specimen:  Blood from Arm, Left Updated:  02/14/20 0903     Sodium 141 mmol/L      Potassium 3 7 mmol/L      Chloride 103 mmol/L      CO2 30 mmol/L      ANION GAP 8 mmol/L      BUN 11 mg/dL      Creatinine 0 86 mg/dL      Glucose 95 mg/dL      Calcium 9 5 mg/dL      eGFR 91 ml/min/1 73sq m     Narrative:       [place] guidelines for Chronic Kidney Disease (CKD):     Stage 1 with normal or high GFR (GFR > 90 mL/min/1 73 square meters)    Stage 2 Mild CKD (GFR = 60-89 mL/min/1 73 square meters)    Stage 3A Moderate CKD (GFR = 45-59 mL/min/1 73 square meters)    Stage 3B Moderate CKD (GFR = 30-44 mL/min/1 73 square meters)    Stage 4 Severe CKD (GFR = 15-29 mL/min/1 73 square meters)    Stage 5 End Stage CKD (GFR <15 mL/min/1 73 square meters)  Note: GFR calculation is accurate only with a steady state creatinine    CBC and differential [[id number]]  (Abnormal) Collected:  02/14/20 0840    Lab Status:  Final result Specimen:  Blood from Arm, Left Updated:  02/14/20 0847     WBC 4 27 Thousand/uL      RBC 4 72 Million/uL      Hemoglobin 12 6 g/dL      Hematocrit 39 9 %      MCV 85 fL      MCH 26 7 pg      MCHC 31 6 g/dL      RDW 19 1 %      Platelets 245 Thousands/uL      nRBC 0 /100 WBCs Neutrophils Relative 58 %      Immat GRANS % 0 %      Lymphocytes Relative 32 %      Monocytes Relative 8 %      Eosinophils Relative 1 %      Basophils Relative 1 %      Neutrophils Absolute 2 50 Thousands/µL      Immature Grans Absolute 0 01 Thousand/uL      Lymphocytes Absolute 1 35 Thousands/µL      Monocytes Absolute 0 34 Thousand/µL      Eosinophils Absolute 0 03 Thousand/µL      Basophils Absolute 0 04 Thousands/µL                  No orders to display              Procedures  Procedures         ED Course  ED Course as of Feb 14 1422   Fri Feb 14, 2020   0900 Similar to labs obtained 2 weeks ago   Hemoglobin: 12 6   0943 Remainder of labs within normal limits                                  MDM      Disposition  Final diagnoses:   BRBPR (bright red blood per rectum)     Time reflects when diagnosis was documented in both MDM as applicable and the Disposition within this note     Time User Action Codes Description Comment    2/14/2020 10:24 AM Beckie Lacey Add [K62 5] BRBPR (bright red blood per rectum)       ED Disposition     ED Disposition Condition Date/Time Comment    Discharge Stable Fri Feb 14, 2020 10:24 AM Erving Ganser discharge to home/self care              Follow-up Information     Follow up With Specialties Details Why Contact Info Additional [address] Family Medicine Schedule an appointment as soon as possible for a visit  To establish care with a family physician [address], [address] [facility] [address]  [address], [address], [address] [place], [place], [address] [phone]    Colorectal Surgery  Go on 3/3/2020 Previously scheduled appointment            Discharge Medication List as of 2/14/2020 10:26 AM      START taking these medications    Details   hydrocortisone-pramoxine (PROCTOFOAM-HC) rectal foam Insert 1 applicator into the rectum 2 (two) times a day for 7 days, Starting Fri 2/14/2020, Until Fri 2/21/2020, Print         CONTINUE these medications which have NOT CHANGED    Details   ferrous gluconate (FERGON) 324 mg tablet Take 1 tablet (324 mg total) by mouth 2 (two) times a day before meals, Starting Tue 12/24/2019, No Print      Multiple Vitamin (MULTIVITAMIN) tablet Take 1 tablet by mouth daily, Historical Med           No discharge procedures on file      PDMP Review     None          ED Provider  Electronically Signed by           Catalina Iverson DO  02/14/20 4069 - - -

## 2022-08-21 NOTE — ED ADULT TRIAGE NOTE - CHIEF COMPLAINT QUOTE
Pt A&Ox4 states "I don't know I guess my parents called."  BIBA c/o paranoid and SI. [facility] 0437 was on scene pt believed parents were going to kill him so he was going to do it first. Pt was found with empty ASA or Advil bottle dumped in a bowl with juice that he was taking. Has not been taking medication x 1month per parents. Pt denies SI here. denies alcohol or drugs. Placed in yellow gown belongings secured.

## 2022-08-21 NOTE — ED PROVIDER NOTE - OBJECTIVE STATEMENT
37yom w/ anxiety brought by EMS for reported paranoia and suicidal thoughts. Pt denies any Si/HI/AVH, but per report from family he has been off his medications for the past month or so and has been increasingly paranoid and delusional. Per EMS report he thought his parents were trying to kill him so he said he was going to do it first. He had bottles of aspirin or advil that were mixed in with juice, he states this was old and he was not taking anything.

## 2022-08-22 LAB — SARS-COV-2 RNA SPEC QL NAA+PROBE: SIGNIFICANT CHANGE UP

## 2022-08-22 PROCEDURE — 99218: CPT

## 2022-08-22 PROCEDURE — 90792 PSYCH DIAG EVAL W/MED SRVCS: CPT

## 2022-08-22 RX ORDER — HALOPERIDOL DECANOATE 100 MG/ML
5 INJECTION INTRAMUSCULAR ONCE
Refills: 0 | Status: COMPLETED | OUTPATIENT
Start: 2022-08-22 | End: 2022-08-22

## 2022-08-22 RX ORDER — CLONAZEPAM 1 MG
1 TABLET ORAL AT BEDTIME
Refills: 0 | Status: DISCONTINUED | OUTPATIENT
Start: 2022-08-22 | End: 2022-08-23

## 2022-08-22 RX ORDER — OLANZAPINE 15 MG/1
10 TABLET, FILM COATED ORAL ONCE
Refills: 0 | Status: DISCONTINUED | OUTPATIENT
Start: 2022-08-22 | End: 2022-08-22

## 2022-08-22 RX ORDER — MIDAZOLAM HYDROCHLORIDE 1 MG/ML
10 INJECTION, SOLUTION INTRAMUSCULAR; INTRAVENOUS ONCE
Refills: 0 | Status: DISCONTINUED | OUTPATIENT
Start: 2022-08-22 | End: 2022-08-22

## 2022-08-22 RX ORDER — OLANZAPINE 15 MG/1
10 TABLET, FILM COATED ORAL ONCE
Refills: 0 | Status: COMPLETED | OUTPATIENT
Start: 2022-08-22 | End: 2022-08-22

## 2022-08-22 RX ORDER — HALOPERIDOL DECANOATE 100 MG/ML
10 INJECTION INTRAMUSCULAR ONCE
Refills: 0 | Status: DISCONTINUED | OUTPATIENT
Start: 2022-08-22 | End: 2022-08-22

## 2022-08-22 RX ORDER — KETAMINE HYDROCHLORIDE 100 MG/ML
500 INJECTION INTRAMUSCULAR; INTRAVENOUS ONCE
Refills: 0 | Status: DISCONTINUED | OUTPATIENT
Start: 2022-08-22 | End: 2022-08-22

## 2022-08-22 RX ORDER — CLONAZEPAM 1 MG
1 TABLET ORAL ONCE
Refills: 0 | Status: DISCONTINUED | OUTPATIENT
Start: 2022-08-22 | End: 2022-08-22

## 2022-08-22 RX ADMIN — OLANZAPINE 10 MILLIGRAM(S): 15 TABLET, FILM COATED ORAL at 10:52

## 2022-08-22 RX ADMIN — MIDAZOLAM HYDROCHLORIDE 10 MILLIGRAM(S): 1 INJECTION, SOLUTION INTRAMUSCULAR; INTRAVENOUS at 12:17

## 2022-08-22 RX ADMIN — KETAMINE HYDROCHLORIDE 500 MILLIGRAM(S): 100 INJECTION INTRAMUSCULAR; INTRAVENOUS at 02:40

## 2022-08-22 RX ADMIN — MIDAZOLAM HYDROCHLORIDE 10 MILLIGRAM(S): 1 INJECTION, SOLUTION INTRAMUSCULAR; INTRAVENOUS at 00:29

## 2022-08-22 RX ADMIN — HALOPERIDOL DECANOATE 5 MILLIGRAM(S): 100 INJECTION INTRAMUSCULAR at 00:29

## 2022-08-22 RX ADMIN — Medication 1 MILLIGRAM(S): at 11:23

## 2022-08-22 NOTE — ED CDU PROVIDER INITIAL DAY NOTE - OBJECTIVE STATEMENT
37yom w/ anxiety brought by EMS for reported paranoia and suicidal thoughts. Pt denies any Si/HI/AVH, but per report from family he has been off his medications for the past month or so and has been increasingly paranoid and delusional. Per EMS report he thought his parents were trying to kill him so he said he was going to do it first. He had bottles of aspirin or advil that were mixed in with juice, he states this was old and he was not taking anything.    pt evelyn by  who reccs admission. pending bed.
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
## 2022-08-22 NOTE — ED ADULT NURSE NOTE - CHIEF COMPLAINT QUOTE
Pt A&Ox4 states "I don't know I guess my parents called."  BIBA c/o paranoid and SI. [facility] 9879 was on scene pt believed parents were going to kill him so he was going to do it first. Pt was found with empty ASA or Advil bottle dumped in a bowl with juice that he was taking. Has not been taking medication x 1month per parents. Pt denies SI here. denies alcohol or drugs. Placed in yellow gown belongings secured.
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
## 2022-08-22 NOTE — ED ADULT NURSE NOTE - OBJECTIVE STATEMENT
BIBEMS and [facility] from home with extreme paranoia, family reports that he has not been taking his psychiatric medications for at least 1 months PTA. Patient tearful with random outbursts of yelling, states that he doesn't want to be watched, doesn't trust whoever is watching him and he shouldn't be here. 1:1 at bedside for safety. MD aware of patient behaviour.

## 2022-08-22 NOTE — ED BEHAVIORAL HEALTH ASSESSMENT NOTE - HPI (INCLUDE ILLNESS QUALITY, SEVERITY, DURATION, TIMING, CONTEXT, MODIFYING FACTORS, ASSOCIATED SIGNS AND SYMPTOMS)
Patient is a 37 YOHM, non-caregiver, resides with parents, with psych hx since age 13, h/o PTSD, in the past diagnosed with bipolar with psychotic features, with h/o  multiple admissions (6) and 2-3 SA, bu cutting b/l wrists and dorsal aspect of left arm and by od on aspirin, , by od on asa, reportedly last admitted to [facility] one year ago, currently in outpatient tx at [facility], , bib [facility] ([id number])  for evaluation of paranoia and possible suicidal ideation.      AS  Per EMS report he thought his parents were trying to kill him so he said he was going to do it first. He had bottles of aspirin or advil that were mixed in with juice.  Patient received multiple PRNs for agitation while in ER, which include Midazolam, Haloperidol and Ketamine. Patient is currently sedated and able to participate minimally in evaluation prior to falling asleep.  Patient reported that he got into argument with his family.  He stated he was doing good and taking his medications and fell asleep.        Most history was obtained from father. He reported that patient has been increasingly paranoid the last 3 days. He has starts worrying that people are going to kill him and that parents want to harm him too. As per father he has not been sleeping. For the last several days he has been edgy, and scared, and locking himself in his room. He was reportedly screaming that his parents wanted to kill him and looking for medications in his parents medicine cabinet. Father reports that he called 911 and found a bowl mixed with 3 bottles of tylenol and juice in patient's closet. He also found bottle of wine in his closet. As per father, patient had not been drinking and has h/o violence/aggression when he starts drinking. He does not know if he has been taking his medications. He has been taking more Klonopin than prescribed and running out early.     Called pt's mother ([facility] number [phone]) who reports patient has been more paranoid than usual. He has been saying TV talks to him and that his family is setting him up.  He has been saying he has been hearing voices to hurt his family but that he fights it.  As per mother he has been worsening over the last month and worse the last two weeks. He has been slowly taking off medications. He has been taking more Klonopin. He has been avoiding eating food. In the past he has thought he was being poisoned. He has been taking Clonazepam 0.5 mg daily , Gabapentin 300 mg twice daily, Olanzapine 10 mg daily.  Mother reports he has not been taking Olanzapine and Gabapentin for the last month.  Mother has safety concerns given his current state. He ran out of Klonopin several day ago (around the 16th). She does report that he is does better when he is taking Klonopin.  He is next scheduled appointment at [facility] is tomorrow. Patient is a 37 YOHM, non-caregiver, resides with parents, with psych hx since age 13, h/o PTSD, in the past diagnosed with bipolar with psychotic features, with h/o  multiple admissions (6) and 2-3 SA, bu cutting b/l wrists and dorsal aspect of left arm and by od on aspirin, , by od on asa, reportedly last admitted to [facility] one year ago, currently in outpatient tx at [facility], , bib [facility] ([id number])  for evaluation of paranoia and possible suicidal ideation.      AS  Per EMS report he thought his parents were trying to kill him so he said he was going to do it first. He had bottles of aspirin or advil that were mixed in with juice.  Patient received multiple PRNs for agitation while in ER, which include Midazolam, Haloperidol and Ketamine. Patient is currently sedated and able to participate minimally in evaluation prior to falling asleep.  Patient reported that he got into argument with his family.  He stated he was doing good and taking his medications and fell asleep.        Most history was obtained from father. He reported that patient has been increasingly paranoid the last 3 days. He has starts worrying that people are going to kill him and that parents want to harm him too. As per father he has not been sleeping. For the last several days he has been edgy, and scared, and locking himself in his room. He was reportedly screaming that his parents wanted to kill him and looking for medications in his parents medicine cabinet. Father reports that he called 911 and found a bowl mixed with 3 bottles of tylenol and juice in patient's closet. He also found bottle of wine in his closet. As per father, patient had not been drinking and has h/o violence/aggression when he starts drinking. He does not know if he has been taking his medications. He has been taking more Klonopin than prescribed and running out early.     Called pt's mother ([facility] number [phone]) who reports patient has been more paranoid than usual. He has been saying TV talks to him and that his family is setting him up.  He has been saying he has been hearing voices to hurt his family but that he fights it.  As per mother he has been worsening over the last month and worse the last two weeks. He has been slowly taking off medications. He has been taking more Klonopin. He has been avoiding eating food. In the past he has thought he was being poisoned. He has been taking Clonazepam 0.5 mg daily , Gabapentin 300 mg twice daily, Olanzapine 10 mg daily.  Mother reports he has not been taking Olanzapine and Gabapentin for the last month.  Mother has safety concerns given his current state. He ran out of Klonopin several day ago (around the 16th). She does report that he is does better when he is taking Klonopin.  He is next scheduled appointment at [facility] is tomorrow.    Writer called [facility] ([phone]) and was transferred to supervisor. Left message on voicemail to call writer back.

## 2022-08-22 NOTE — ED BEHAVIORAL HEALTH ASSESSMENT NOTE - DESCRIPTION
resides with parents Patient is currently sedated.  Unable to fully engage in interview    ICU Vital Signs Last 24 Hrs  T(C): 36.6 (22 Aug 2022 11:00), Max: 36.9 (22 Aug 2022 01:57)  T(F): 97.9 (22 Aug 2022 11:00), Max: 98.4 (22 Aug 2022 01:57)  HR: 68 (22 Aug 2022 11:00) (68 - 89)  BP: 129/61 (22 Aug 2022 11:00) (129/61 - 146/91)  BP(mean): --  ABP: --  ABP(mean): --  RR: 18 (22 Aug 2022 11:00) (16 - 18)  SpO2: 99% (22 Aug 2022 11:00) (97% - 100%)    O2 Parameters below as of 22 Aug 2022 11:00  Patient On (Oxygen Delivery Method): room air denies

## 2022-08-23 VITALS
RESPIRATION RATE: 20 BRPM | TEMPERATURE: 98 F | DIASTOLIC BLOOD PRESSURE: 93 MMHG | SYSTOLIC BLOOD PRESSURE: 137 MMHG | OXYGEN SATURATION: 98 % | HEART RATE: 100 BPM

## 2022-08-23 PROCEDURE — 99285 EMERGENCY DEPT VISIT HI MDM: CPT | Mod: 25

## 2022-08-23 PROCEDURE — U0003: CPT

## 2022-08-23 PROCEDURE — 81003 URINALYSIS AUTO W/O SCOPE: CPT

## 2022-08-23 PROCEDURE — 93005 ELECTROCARDIOGRAM TRACING: CPT

## 2022-08-23 PROCEDURE — 80048 BASIC METABOLIC PNL TOTAL CA: CPT

## 2022-08-23 PROCEDURE — 96372 THER/PROPH/DIAG INJ SC/IM: CPT

## 2022-08-23 PROCEDURE — 36415 COLL VENOUS BLD VENIPUNCTURE: CPT

## 2022-08-23 PROCEDURE — 80307 DRUG TEST PRSMV CHEM ANLYZR: CPT

## 2022-08-23 PROCEDURE — U0005: CPT

## 2022-08-23 PROCEDURE — 99217: CPT

## 2022-08-23 PROCEDURE — 85025 COMPLETE CBC W/AUTO DIFF WBC: CPT

## 2022-08-23 PROCEDURE — G0378: CPT

## 2022-08-23 RX ORDER — GABAPENTIN 400 MG/1
300 CAPSULE ORAL
Refills: 0 | Status: DISCONTINUED | OUTPATIENT
Start: 2022-08-23 | End: 2022-08-26

## 2022-08-23 RX ORDER — OLANZAPINE 15 MG/1
10 TABLET, FILM COATED ORAL ONCE
Refills: 0 | Status: COMPLETED | OUTPATIENT
Start: 2022-08-23 | End: 2022-08-23

## 2022-08-23 RX ADMIN — Medication 1 MILLIGRAM(S): at 07:30

## 2022-08-23 RX ADMIN — GABAPENTIN 300 MILLIGRAM(S): 400 CAPSULE ORAL at 11:42

## 2022-08-23 RX ADMIN — OLANZAPINE 10 MILLIGRAM(S): 15 TABLET, FILM COATED ORAL at 11:42

## 2022-08-23 NOTE — ED CDU PROVIDER DISPOSITION NOTE - CLINICAL COURSE
pt signed out to me by Dr. servin pending repeat psych eval. Pt cleared by psych and will d/c with outpatient f/up and return instructions. Pt medically fit for d/c.

## 2022-08-23 NOTE — ED CDU PROVIDER DISPOSITION NOTE - PATIENT PORTAL LINK FT
You can access the FollowMyHealth Patient Portal offered by [facility] by registering at the following website: [email or web address]. By joining [employer]’s FollowMyHealth portal, you will also be able to view your health information using other applications (apps) compatible with our system.

## 2022-08-23 NOTE — CHART NOTE - NSCHARTNOTEFT_GEN_A_CORE
SWNote: pt seen by behavioral MD(Linh) pt hold for re eval in the am . SW to follow.
SW Note: Notified by  provider that the pt has been cleared for discharge home and to continue with his O/P provider. MD spoke with pts O/P provider and family. No SW needs identified.

## 2022-08-23 NOTE — ED ADULT NURSE REASSESSMENT NOTE - COMFORT CARE
meal provided/plan of care explained/po fluids offered
meal provided/plan of care explained/po fluids offered

## 2022-08-23 NOTE — ED ADULT NURSE REASSESSMENT NOTE - NS ED NURSE REASSESS COMMENT FT1
Assumed care of patient.  Pt alert and cooperative.  Pt requesting sandwich and po fluids.  Pt states that he is feeling better today.  will monitor and chart changes and maintain safe environment
pt continues to appear restless ambulating around the unit. pt talks to himself. pt denies auditory or visual hallucinations. pt is pleasant and cooperative.
pt screaming and refusing to get back in bed, PT threatening to leave ED sitter at bedside,. Md made aware.
pt was re-evaluated by psychiatry and cleared to be discharged home. pt endorses this plan.
received pt from critical.  pt asleep, but waking up occasionally to use urinal.  pt is calm and appropriate.  1:1 at bedside
pt appears restless pacing around the unit, Klonopin 1mg dispensed. pt awaiting re-evaluation.
pt received awake and alert oriented x3. pt was medically cleared by the ed attending. pt denies suicidal or homicidal ideations and auditory or visual hallucinations. pt reports being brought to the ed against his will by his parents. pt has a constricted affect and is cooperative during interview. pt admits to using marijuana but denies illicit drug use. pt admits to alcohol use socially. pt states that he is compliant with medications Zyprexa and Klonopin.

## 2022-08-23 NOTE — ED ADULT NURSE REASSESSMENT NOTE - NSIMPLEMENTINTERV_GEN_ALL_ED
Implemented All Universal Safety Interventions:  [place] to call system. Call bell, personal items and telephone within reach. Instruct patient to call for assistance. Room bathroom lighting operational. Non-slip footwear when patient is off stretcher. Physically safe environment: no spills, clutter or unnecessary equipment. Stretcher in lowest position, wheels locked, appropriate side rails in place.
Implemented All Universal Safety Interventions:  [place] to call system. Call bell, personal items and telephone within reach. Instruct patient to call for assistance. Room bathroom lighting operational. Non-slip footwear when patient is off stretcher. Physically safe environment: no spills, clutter or unnecessary equipment. Stretcher in lowest position, wheels locked, appropriate side rails in place.

## 2022-08-23 NOTE — ED BEHAVIORAL HEALTH NOTE - BEHAVIORAL HEALTH NOTE
PROGRESS NOTE: 22 @ 09:20  	  • Reason for Ongoing Consultation: Reassessment of possible suicidality and psychiatric condition     ID: [id number] Male with HEALTH ISSUES - PROBLEM Dx:  Alcohol Abuse   Marijuana Abuse   PTSD   Bipolar Disorder     INTERVAL DATA:   • Interval Chief Complaint: "I want to go home"   • Interval History:  37 year old male evaluated at bedside this morning for reassessment of possible SI, paranoia and aggressive behavior, he is calm and cooperative this morning. Pt states that he feels well today and is requesting to go home. Pt questioned about Tylenol and juice found in closet as per collateral, states that this was his friends and he didn't know it was there. Pt denying any suicidality/homicidality, states that he does not feel his family is going to hurt him and endorses a good relationship with family. He attributes his symptoms to running out of Klonopin prescription as he takes 1mg instead of the 0.5mg as it is prescribed. He states he has been trying to contact [facility] to increase his dose with little success. He states that he was taken off of Depakote due to feeling violent and shaky. Pt seen pacing around unit this morning, he denies restlessness and states he has been trying to stay active recently since gaining 60lbs. Pt denies AH/VH/SI/HI, no delusions elicited during interview.     REVIEW OF SYSTEMS:   • Constitutional Symptoms	No complaints  • Eyes	No complaints  • Ears / Nose / Throat / Mouth	No complaints  • Cardiovascular	No complaints  • Respiratory	No complaints  • Gastrointestinal	No complaints  • Genitourinary	No complaints  • Musculoskeletal	No complaints  • Skin	No complaints  • Neurological	No complaints  • Psychiatric (see HPI)	See HPI  • Endocrine	No complaints  • Hematologic / Lymphatic	No complaints  • Allergic / Immunologic	No complaints    REVIEW OF VITALS/LABS/IMAGING/INVESTIGATIONS:   • Vital signs reviewed: Yes  • Vital Signs:	    T(C): 36.9 (22 @ 07:39), Max: 36.9 (22 @ 00:36)  HR: 89 (22 @ 07:39) (61 - 92)  BP: 145/88 (22 @ 07:39) (114/69 - 145/88)  RR: 18 (22 @ 07:39) (18 - 20)  SpO2: 97% (08-23-22 @ 07:39) (96% - 99%)    • Available labs reviewed: Yes  • Available Lab Results:                           14.3   8.95  )-----------( 300      ( 21 Aug 2022 21:51 )             40.4     08-    136  |  100  |  13.5  ----------------------------<  113<H>  3.5   |  22.0  |  0.68    Ca    9.5      21 Aug 2022 21:51          Urinalysis Basic - ( 21 Aug 2022 21:51 )    Color: Yellow / Appearance: Clear / S.020 / pH: x  Gluc: x / Ketone: Trace  / Bili: Negative / Urobili: Negative mg/dL   Blood: x / Protein: Negative / Nitrite: Negative   Leuk Esterase: Negative / RBC: x / WBC x   Sq Epi: x / Non Sq Epi: x / Bacteria: x    MEDICATIONS:    PRN Medications: Clonazepam PRN 1mg   • PRN Medications since last evaluation Clonazepam 1mg x 1 	  • PRN Details patient restless this morning, normally takes Klonopin daily     Current Medications:   clonazePAM  Tablet 1 milliGRAM(s) Oral at bedtime PRN     Medication Side Effects:  • Medication Side Effects or Adverse Reactions (new or ongoing)	None known    MENTAL STATUS EXAM:   • Level of Consciousness	Alert  • General Appearance	Well developed  • Body Habitus  Obese   • Hygiene	Good  • Grooming	Good  • Behavior	Cooperative, calm   • Eye Contact	Good  • Relatedness	Good  • Impulse Control	Normal  • Muscle Tone / Strength	Normal muscle tone/strength  • Abnormal Movements	No abnormal movements  • Gait / Station	Normal gait / station  • Speech Volume	Normal  • Speech Rate	Normal  • Speech Spontaneity	Normal  • Speech Articulation	Normal  • Mood	Normal  • Affect Quality	Euthymic  • Affect Range	Full  • Affect Congruence	Congruent  • Thought Process	Linear  • Thought Associations	Normal  • Thought Content	Unremarkable  • Perceptions	No abnormalities  • Oriented to Time	Yes  • Oriented to Place	Yes  • Oriented to Situation	Yes  • Oriented to Person	Yes  • Attention / Concentration	Normal  • Estimated Intelligence	Average  • Recent Memory	Normal  • Remote Memory	Normal  • Fund of Knowledge	Normal  • Language	No abnormalities noted  • Judgment (regarding everyday events)	Fair  • Insight (regarding psychiatric illness)	Fair    SUICIDALITY:   • Suicidality (Interval)	Pt denies suicidality currently, patient denies past suicidality/attempts    HOMICIDALITY/AGGRESSION:   • Homicidality/Aggression	Pt denies homicidality, specifically he denies homicidality against his family. He states that he does not feel they are going to hurt him.     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      ASSESSMENT OF CURRENT CONDITION:   Summary (include case differential, formulation and patient response to therapy): 37 year male with PPH of PTSD, bipolar disorder, alcohol & cannabis use disorder seen at beside today for reassessment of aggressive behavior, paranoia and suicidality. Pt attributing his symptoms to running out, of Klonopin. He is denying any suicidality, homicidality, mood symptoms, AH/VH. Pt requesting to go home, states he does not want to be transferred to in-patient psychiatric facility. Plan for treat and release.     Risk Assessment (consider static vs modifiable risk factors and protective factors; comment on level of risk for dangerous behavior):  RF: substance abuse disorders, psychiatric diagnosis, impulsivity, medication compliance issues   PF: residential stability, engagement in treatment, no prior suicide attempts  Moderate Risk     PLAN: Plan for treat and release. PROGRESS NOTE: 22 @ 09:20  	  • Reason for Ongoing Consultation: Reassessment of possible suicidality and paranoia    ID: 37yyo Male with HEALTH ISSUES - PROBLEM Dx:  Alcohol Abuse   Marijuana Abuse   PTSD   Bipolar Disorder     INTERVAL DATA:   • Interval Chief Complaint: "I want to go home"   • Interval History:  37 year old male evaluated at bedside this morning for reassessment of possible SI, paranoia and aggressive behavior, he is calm and cooperative this morning. Pt states that he feels well today and is requesting to go home. Pt questioned about Tylenol and juice found in closet as per collateral, states that this was his friends and he didn't know it was there. Pt denying any suicidality/homicidality, states that he does not feel his family is going to hurt him and endorses a good relationship with family. He attributes his symptoms to running out of Klonopin prescription as he takes 1mg instead of the 0.5mg as it is prescribed. He states he has been trying to contact [facility] to increase his dose with little success. He states that he was taken off of Depakote due to feeling violent and shaky. Pt seen pacing around unit this morning, he denies restlessness and states he has been trying to stay active recently since gaining 60lbs. Pt denies AH/VH/SI/HI, no delusions elicited during interview.  Patient was pacing around unit, at times he was muttering under his breath. He was not aggressive or agitated.  He reported that he felt safe going home. He planned to continue taking his medications.  UDS was positive for cannabis on admission.       Writer spoke with patient/s psychaitrist, Dr. Amaya, who reports that she has been following patient for years.  She states that he sometimes "plays" with his medications.  He uses marijuana on and off which makes his symptoms worse and he becomes more paranoid. .  He has h/o fights with his parents.  She verified his prescribed medications. He was taken off of Depakote because he reportedly felt worse.  He was at his usual self at prior evaluation. Discussed his overuse of Klonopin. Dr. Amaya has appointment with patient today at 5 pm.  She reported that she would discuss increasing his Klonopin on interview.       Writer spoke with pt's mother and father, and discussed pt's current presentation.  Parents felt safe taking patient back and would pick patient up when cleared.      REVIEW OF SYSTEMS:   • Constitutional Symptoms	No complaints  • Eyes	No complaints  • Ears / Nose / Throat / Mouth	No complaints  • Cardiovascular	No complaints  • Respiratory	No complaints  • Gastrointestinal	No complaints  • Genitourinary	No complaints  • Musculoskeletal	No complaints  • Skin	No complaints  • Neurological	No complaints  • Psychiatric (see HPI)	See HPI  • Endocrine	No complaints  • Hematologic / Lymphatic	No complaints  • Allergic / Immunologic	No complaints    REVIEW OF VITALS/LABS/IMAGING/INVESTIGATIONS:   • Vital signs reviewed: Yes  • Vital Signs:	    T(C): 36.9 (22 @ 07:39), Max: 36.9 (22 @ 00:36)  HR: 89 (22 @ 07:39) (61 - 92)  BP: 145/88 (22 @ 07:39) (114/69 - 145/88)  RR: 18 (22 @ 07:39) (18 - 20)  SpO2: 97% (22 @ 07:39) (96% - 99%)    • Available labs reviewed: Yes  • Available Lab Results:                           14.3   8.95  )-----------( 300      ( 21 Aug 2022 21:51 )             40.4     08-21    136  |  100  |  13.5  ----------------------------<  113<H>  3.5   |  22.0  |  0.68    Ca    9.5      21 Aug 2022 21:51          Urinalysis Basic - ( 21 Aug 2022 21:51 )    Color: Yellow / Appearance: Clear / S.020 / pH: x  Gluc: x / Ketone: Trace  / Bili: Negative / Urobili: Negative mg/dL   Blood: x / Protein: Negative / Nitrite: Negative   Leuk Esterase: Negative / RBC: x / WBC x   Sq Epi: x / Non Sq Epi: x / Bacteria: x    MEDICATIONS:    PRN Medications: Clonazepam PRN 1mg   • PRN Medications since last evaluation Clonazepam 1mg x 1 	  • PRN Details patient restless this morning, normally takes Klonopin daily     Current Medications:   clonazePAM  Tablet 1 milliGRAM(s) Oral at bedtime PRN     Medication Side Effects:  • Medication Side Effects or Adverse Reactions (new or ongoing)	None known    MENTAL STATUS EXAM:   • Level of Consciousness	Alert  • General Appearance	Well developed  • Body Habitus  Obese   • Hygiene	Good  • Grooming	Good  • Behavior	Cooperative, calm   • Eye Contact	Good  • Relatedness	Good  • Impulse Control	Normal  • Muscle Tone / Strength	Normal muscle tone/strength  • Abnormal Movements	No abnormal movements  • Gait / Station	Normal gait / station  • Speech Volume	Normal  • Speech Rate	Normal  • Speech Spontaneity	Normal  • Speech Articulation	Normal  • Mood	Normal  • Affect Quality	Euthymic  • Affect Range	Full  • Affect Congruence	Congruent  • Thought Process	Linear  • Thought Associations	Normal  • Thought Content	Unremarkable  • Perceptions	No abnormalities  • Oriented to Time	Yes  • Oriented to Place	Yes  • Oriented to Situation	Yes  • Oriented to Person	Yes  • Attention / Concentration	Normal  • Estimated Intelligence	Average  • Recent Memory	Normal  • Remote Memory	Normal  • Fund of Knowledge	Normal  • Language	No abnormalities noted  • Judgment (regarding everyday events)	Fair  • Insight (regarding psychiatric illness)	Fair    SUICIDALITY:   • Suicidality (Interval)	Pt denies suicidality currently, patient denies past suicidality/attempts, has been in good behavioral control.     HOMICIDALITY/AGGRESSION:   • Homicidality/Aggression	Pt denies homicidality, specifically he denies homicidality against his family. He states that he does not feel they are going to hurt him.     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      ASSESSMENT OF CURRENT CONDITION:   Summary (include case differential, formulation and patient response to therapy): 37 year male with PPH of PTSD, bipolar disorder, alcohol & cannabis use disorder seen at beside today for reassessment of aggressive behavior, paranoia and suicidality. Pt attributing his symptoms to running out, of Klonopin. He is denying any suicidality, homicidality, mood symptoms, AH/VH.  He has been in good behavioral control. UDS was positive for cannabis which may have been contributing to exacerbation of symptoms.  He has been taking his medications as prescribed today and yesterday.   Collateral was obtained from psychiatrist and from family.  Pt requesting to go home, states he does not want to be transferred to in-patient psychiatric facility. Plan for treat and release.  Current presentation does not warrant involuntary inpatient hospitalizations.     Risk Assessment (consider static vs modifiable risk factors and protective factors; comment on level of risk for dangerous behavior):  RF: substance abuse disorders, psychiatric diagnosis, impulsivity, medication compliance issues  PF: residential stability, engagement in treatment, no prior suicide attempts, good support, denies any S/H I/I/P.  Moderate Risk     PLAN: Plan for treat and release.  Follow up with psychiatrist today at 5 pm  Discussed w/ patient and family  to go  Nearest ER or call 911, If you notice any of the followin) Agitation, Aggression or Anxiety,    2) Suicidal or homicidal thoughts 3) Worsening of symptoms or 4) Side effects of medications

## 2022-09-20 ENCOUNTER — INPATIENT (INPATIENT)
Facility: HOSPITAL | Age: 37
LOS: 7 days | Discharge: HOME | DRG: 917 | End: 2022-09-28
Attending: STUDENT IN AN ORGANIZED HEALTH CARE EDUCATION/TRAINING PROGRAM | Admitting: HOSPITALIST
Payer: MEDICARE

## 2022-09-20 VITALS — HEIGHT: 67 IN

## 2022-09-20 DIAGNOSIS — T39.1X1A POISONING BY 4-AMINOPHENOL DERIVATIVES, ACCIDENTAL (UNINTENTIONAL), INITIAL ENCOUNTER: ICD-10-CM

## 2022-09-20 LAB
ALBUMIN SERPL ELPH-MCNC: 3.8 G/DL — SIGNIFICANT CHANGE UP (ref 3.3–5.2)
ALBUMIN SERPL ELPH-MCNC: 4.3 G/DL — SIGNIFICANT CHANGE UP (ref 3.3–5.2)
ALP SERPL-CCNC: 61 U/L — SIGNIFICANT CHANGE UP (ref 40–120)
ALP SERPL-CCNC: 80 U/L — SIGNIFICANT CHANGE UP (ref 40–120)
ALT FLD-CCNC: 100 U/L — HIGH
ALT FLD-CCNC: 112 U/L — HIGH
AMMONIA BLD-MCNC: 40 UMOL/L — SIGNIFICANT CHANGE UP (ref 11–55)
ANION GAP SERPL CALC-SCNC: 17 MMOL/L — SIGNIFICANT CHANGE UP (ref 5–17)
ANION GAP SERPL CALC-SCNC: 24 MMOL/L — HIGH (ref 5–17)
APAP SERPL-MCNC: 561.2 UG/ML — CRITICAL HIGH (ref 10–26)
APAP SERPL-MCNC: >600 UG/ML — CRITICAL HIGH (ref 10–26)
APPEARANCE UR: CLEAR — SIGNIFICANT CHANGE UP
APTT BLD: 23.5 SEC — LOW (ref 27.5–35.5)
APTT BLD: 30.7 SEC — SIGNIFICANT CHANGE UP (ref 27.5–35.5)
AST SERPL-CCNC: 109 U/L — HIGH
AST SERPL-CCNC: 95 U/L — HIGH
BACTERIA # UR AUTO: ABNORMAL
BASE EXCESS BLDA CALC-SCNC: -12.9 MMOL/L — LOW (ref -2–3)
BASE EXCESS BLDV CALC-SCNC: -13.1 MMOL/L — LOW (ref -2–3)
BASE EXCESS BLDV CALC-SCNC: -13.7 MMOL/L — LOW (ref -2–3)
BASE EXCESS BLDV CALC-SCNC: -17.7 MMOL/L — LOW (ref -2–3)
BASOPHILS # BLD AUTO: 0 K/UL — SIGNIFICANT CHANGE UP (ref 0–0.2)
BASOPHILS NFR BLD AUTO: 0 % — SIGNIFICANT CHANGE UP (ref 0–2)
BILIRUB SERPL-MCNC: 1 MG/DL — SIGNIFICANT CHANGE UP (ref 0.4–2)
BILIRUB SERPL-MCNC: 1.2 MG/DL — SIGNIFICANT CHANGE UP (ref 0.4–2)
BILIRUB UR-MCNC: NEGATIVE — SIGNIFICANT CHANGE UP
BLD GP AB SCN SERPL QL: SIGNIFICANT CHANGE UP
BLOOD GAS COMMENTS ARTERIAL: SIGNIFICANT CHANGE UP
BUN SERPL-MCNC: 16.1 MG/DL — SIGNIFICANT CHANGE UP (ref 8–20)
BUN SERPL-MCNC: 17.7 MG/DL — SIGNIFICANT CHANGE UP (ref 8–20)
CA-I SERPL-SCNC: 1.05 MMOL/L — LOW (ref 1.15–1.33)
CA-I SERPL-SCNC: 1.06 MMOL/L — LOW (ref 1.15–1.33)
CA-I SERPL-SCNC: 1.2 MMOL/L — SIGNIFICANT CHANGE UP (ref 1.15–1.33)
CALCIUM SERPL-MCNC: 7.9 MG/DL — LOW (ref 8.4–10.5)
CALCIUM SERPL-MCNC: 9.2 MG/DL — SIGNIFICANT CHANGE UP (ref 8.4–10.5)
CHLORIDE BLDV-SCNC: 107 MMOL/L — SIGNIFICANT CHANGE UP (ref 98–107)
CHLORIDE BLDV-SCNC: 112 MMOL/L — HIGH (ref 98–107)
CHLORIDE BLDV-SCNC: 112 MMOL/L — HIGH (ref 98–107)
CHLORIDE SERPL-SCNC: 105 MMOL/L — SIGNIFICANT CHANGE UP (ref 98–107)
CHLORIDE SERPL-SCNC: 111 MMOL/L — HIGH (ref 98–107)
CK MB CFR SERPL CALC: 20.1 NG/ML — HIGH (ref 0–6.7)
CK MB CFR SERPL CALC: 7.9 NG/ML — HIGH (ref 0–6.7)
CK SERPL-CCNC: 2444 U/L — HIGH (ref 30–200)
CK SERPL-CCNC: 735 U/L — HIGH (ref 30–200)
CO2 SERPL-SCNC: 12 MMOL/L — LOW (ref 22–29)
CO2 SERPL-SCNC: 13 MMOL/L — LOW (ref 22–29)
COLOR SPEC: YELLOW — SIGNIFICANT CHANGE UP
CREAT SERPL-MCNC: 0.79 MG/DL — SIGNIFICANT CHANGE UP (ref 0.5–1.3)
CREAT SERPL-MCNC: 0.96 MG/DL — SIGNIFICANT CHANGE UP (ref 0.5–1.3)
DIFF PNL FLD: ABNORMAL
EGFR: 104 ML/MIN/1.73M2 — SIGNIFICANT CHANGE UP
EGFR: 117 ML/MIN/1.73M2 — SIGNIFICANT CHANGE UP
EOSINOPHIL # BLD AUTO: 0 K/UL — SIGNIFICANT CHANGE UP (ref 0–0.5)
EOSINOPHIL NFR BLD AUTO: 0 % — SIGNIFICANT CHANGE UP (ref 0–6)
EPI CELLS # UR: SIGNIFICANT CHANGE UP
ETHANOL SERPL-MCNC: <10 MG/DL — SIGNIFICANT CHANGE UP (ref 0–9)
GAS PNL BLDV: 140 MMOL/L — SIGNIFICANT CHANGE UP (ref 136–145)
GAS PNL BLDV: 142 MMOL/L — SIGNIFICANT CHANGE UP (ref 136–145)
GAS PNL BLDV: 144 MMOL/L — SIGNIFICANT CHANGE UP (ref 136–145)
GAS PNL BLDV: SIGNIFICANT CHANGE UP
GLUCOSE BLDV-MCNC: 109 MG/DL — HIGH (ref 70–99)
GLUCOSE BLDV-MCNC: 164 MG/DL — HIGH (ref 70–99)
GLUCOSE BLDV-MCNC: 167 MG/DL — HIGH (ref 70–99)
GLUCOSE SERPL-MCNC: 153 MG/DL — HIGH (ref 70–99)
GLUCOSE SERPL-MCNC: 163 MG/DL — HIGH (ref 70–99)
GLUCOSE UR QL: 100 MG/DL
HCO3 BLDA-SCNC: 13 MMOL/L — LOW (ref 21–28)
HCO3 BLDV-SCNC: 12 MMOL/L — LOW (ref 22–29)
HCO3 BLDV-SCNC: 14 MMOL/L — LOW (ref 22–29)
HCO3 BLDV-SCNC: 15 MMOL/L — LOW (ref 22–29)
HCT VFR BLD CALC: 49.3 % — SIGNIFICANT CHANGE UP (ref 39–50)
HCT VFR BLDA CALC: 41 % — SIGNIFICANT CHANGE UP
HCT VFR BLDA CALC: 44 % — SIGNIFICANT CHANGE UP
HCT VFR BLDA CALC: 51 % — SIGNIFICANT CHANGE UP
HGB BLD CALC-MCNC: 13.5 G/DL — SIGNIFICANT CHANGE UP (ref 12.6–17.4)
HGB BLD CALC-MCNC: 14.7 G/DL — SIGNIFICANT CHANGE UP (ref 12.6–17.4)
HGB BLD CALC-MCNC: 17.1 G/DL — SIGNIFICANT CHANGE UP (ref 12.6–17.4)
HGB BLD-MCNC: 17.1 G/DL — HIGH (ref 13–17)
HOROWITZ INDEX BLDA+IHG-RTO: 0.4 — SIGNIFICANT CHANGE UP
INR BLD: 1.31 RATIO — HIGH (ref 0.88–1.16)
INR BLD: 1.4 RATIO — HIGH (ref 0.88–1.16)
KETONES UR-MCNC: ABNORMAL
LACTATE BLDV-MCNC: 2.1 MMOL/L — HIGH (ref 0.5–2)
LACTATE BLDV-MCNC: 3.8 MMOL/L — HIGH (ref 0.5–2)
LACTATE BLDV-MCNC: 7.4 MMOL/L — CRITICAL HIGH (ref 0.5–2)
LEUKOCYTE ESTERASE UR-ACNC: NEGATIVE — SIGNIFICANT CHANGE UP
LIDOCAIN IGE QN: 20 U/L — LOW (ref 22–51)
LYMPHOCYTES # BLD AUTO: 0.88 K/UL — LOW (ref 1–3.3)
LYMPHOCYTES # BLD AUTO: 4 % — LOW (ref 13–44)
MAGNESIUM SERPL-MCNC: 2.3 MG/DL — SIGNIFICANT CHANGE UP (ref 1.6–2.6)
MANUAL SMEAR VERIFICATION: SIGNIFICANT CHANGE UP
MCHC RBC-ENTMCNC: 29.6 PG — SIGNIFICANT CHANGE UP (ref 27–34)
MCHC RBC-ENTMCNC: 34.7 GM/DL — SIGNIFICANT CHANGE UP (ref 32–36)
MCV RBC AUTO: 85.3 FL — SIGNIFICANT CHANGE UP (ref 80–100)
MONOCYTES # BLD AUTO: 0.15 K/UL — SIGNIFICANT CHANGE UP (ref 0–0.9)
MONOCYTES NFR BLD AUTO: 0.7 % — LOW (ref 2–14)
MYELOCYTES NFR BLD: 0.7 % — HIGH (ref 0–0)
NEUTROPHILS # BLD AUTO: 20.83 K/UL — HIGH (ref 1.8–7.4)
NEUTROPHILS NFR BLD AUTO: 87.3 % — HIGH (ref 43–77)
NEUTS BAND # BLD: 7.3 % — SIGNIFICANT CHANGE UP (ref 0–8)
NITRITE UR-MCNC: NEGATIVE — SIGNIFICANT CHANGE UP
OSMOLALITY SERPL: 318 MOSMOL/KG — HIGH (ref 275–300)
PCO2 BLDA: 23 MMHG — LOW (ref 35–48)
PCO2 BLDV: 30 MMHG — LOW (ref 42–55)
PCO2 BLDV: 40 MMHG — LOW (ref 42–55)
PCO2 BLDV: 42 MMHG — SIGNIFICANT CHANGE UP (ref 42–55)
PCP SPEC-MCNC: SIGNIFICANT CHANGE UP
PH BLDA: 7.35 — SIGNIFICANT CHANGE UP (ref 7.35–7.45)
PH BLDV: 7.09 — CRITICAL LOW (ref 7.32–7.43)
PH BLDV: 7.16 — CRITICAL LOW (ref 7.32–7.43)
PH BLDV: 7.27 — LOW (ref 7.32–7.43)
PH UR: 5 — SIGNIFICANT CHANGE UP (ref 5–8)
PLAT MORPH BLD: NORMAL — SIGNIFICANT CHANGE UP
PLATELET # BLD AUTO: 306 K/UL — SIGNIFICANT CHANGE UP (ref 150–400)
PO2 BLDA: 133 MMHG — HIGH (ref 83–108)
PO2 BLDV: 185 MMHG — HIGH (ref 25–45)
PO2 BLDV: 67 MMHG — HIGH (ref 25–45)
PO2 BLDV: 75 MMHG — HIGH (ref 25–45)
POIKILOCYTOSIS BLD QL AUTO: SIGNIFICANT CHANGE UP
POTASSIUM BLDV-SCNC: 3.5 MMOL/L — SIGNIFICANT CHANGE UP (ref 3.5–5.1)
POTASSIUM BLDV-SCNC: 3.6 MMOL/L — SIGNIFICANT CHANGE UP (ref 3.5–5.1)
POTASSIUM BLDV-SCNC: 3.7 MMOL/L — SIGNIFICANT CHANGE UP (ref 3.5–5.1)
POTASSIUM SERPL-MCNC: 3.7 MMOL/L — SIGNIFICANT CHANGE UP (ref 3.5–5.3)
POTASSIUM SERPL-MCNC: 3.9 MMOL/L — SIGNIFICANT CHANGE UP (ref 3.5–5.3)
POTASSIUM SERPL-SCNC: 3.7 MMOL/L — SIGNIFICANT CHANGE UP (ref 3.5–5.3)
POTASSIUM SERPL-SCNC: 3.9 MMOL/L — SIGNIFICANT CHANGE UP (ref 3.5–5.3)
PROT SERPL-MCNC: 6 G/DL — LOW (ref 6.6–8.7)
PROT SERPL-MCNC: 7.8 G/DL — SIGNIFICANT CHANGE UP (ref 6.6–8.7)
PROT UR-MCNC: 100 MG/DL
PROTHROM AB SERPL-ACNC: 15.2 SEC — HIGH (ref 10.5–13.4)
PROTHROM AB SERPL-ACNC: 16.3 SEC — HIGH (ref 10.5–13.4)
RAPID RVP RESULT: SIGNIFICANT CHANGE UP
RBC # BLD: 5.78 M/UL — SIGNIFICANT CHANGE UP (ref 4.2–5.8)
RBC # FLD: 12.9 % — SIGNIFICANT CHANGE UP (ref 10.3–14.5)
RBC BLD AUTO: ABNORMAL
RBC CASTS # UR COMP ASSIST: ABNORMAL /HPF (ref 0–4)
SALICYLATES SERPL-MCNC: <0.6 MG/DL — LOW (ref 10–20)
SAO2 % BLDA: 100 % — HIGH (ref 94–98)
SAO2 % BLDV: 100 % — SIGNIFICANT CHANGE UP
SAO2 % BLDV: 89.4 % — SIGNIFICANT CHANGE UP
SAO2 % BLDV: 95.4 % — SIGNIFICANT CHANGE UP
SARS-COV-2 RNA SPEC QL NAA+PROBE: SIGNIFICANT CHANGE UP
SMUDGE CELLS # BLD: PRESENT — SIGNIFICANT CHANGE UP
SODIUM SERPL-SCNC: 141 MMOL/L — SIGNIFICANT CHANGE UP (ref 135–145)
SODIUM SERPL-SCNC: 141 MMOL/L — SIGNIFICANT CHANGE UP (ref 135–145)
SP GR SPEC: 1.02 — SIGNIFICANT CHANGE UP (ref 1.01–1.02)
TROPONIN T SERPL-MCNC: <0.01 NG/ML — SIGNIFICANT CHANGE UP (ref 0–0.06)
TSH SERPL-MCNC: 1.37 UIU/ML — SIGNIFICANT CHANGE UP (ref 0.27–4.2)
UROBILINOGEN FLD QL: NEGATIVE MG/DL — SIGNIFICANT CHANGE UP
WBC # BLD: 22.02 K/UL — HIGH (ref 3.8–10.5)
WBC # FLD AUTO: 22.02 K/UL — HIGH (ref 3.8–10.5)
WBC UR QL: SIGNIFICANT CHANGE UP /HPF (ref 0–5)

## 2022-09-20 PROCEDURE — 31500 INSERT EMERGENCY AIRWAY: CPT

## 2022-09-20 PROCEDURE — 36556 INSERT NON-TUNNEL CV CATH: CPT

## 2022-09-20 PROCEDURE — 71045 X-RAY EXAM CHEST 1 VIEW: CPT | Mod: 26,77

## 2022-09-20 PROCEDURE — 71045 X-RAY EXAM CHEST 1 VIEW: CPT | Mod: 26

## 2022-09-20 PROCEDURE — 70450 CT HEAD/BRAIN W/O DYE: CPT | Mod: 26,MA

## 2022-09-20 PROCEDURE — 99291 CRITICAL CARE FIRST HOUR: CPT | Mod: 25

## 2022-09-20 PROCEDURE — 72125 CT NECK SPINE W/O DYE: CPT | Mod: 26,MA

## 2022-09-20 PROCEDURE — 99223 1ST HOSP IP/OBS HIGH 75: CPT

## 2022-09-20 RX ORDER — ACETYLCYSTEINE 200 MG/ML
10 VIAL (ML) MISCELLANEOUS ONCE
Refills: 0 | Status: DISCONTINUED | OUTPATIENT
Start: 2022-09-20 | End: 2022-09-20

## 2022-09-20 RX ORDER — SODIUM CHLORIDE 9 MG/ML
2000 INJECTION INTRAMUSCULAR; INTRAVENOUS; SUBCUTANEOUS ONCE
Refills: 0 | Status: COMPLETED | OUTPATIENT
Start: 2022-09-20 | End: 2022-09-20

## 2022-09-20 RX ORDER — MIDAZOLAM HYDROCHLORIDE 1 MG/ML
2 INJECTION, SOLUTION INTRAMUSCULAR; INTRAVENOUS ONCE
Refills: 0 | Status: DISCONTINUED | OUTPATIENT
Start: 2022-09-20 | End: 2022-09-20

## 2022-09-20 RX ORDER — CHLORHEXIDINE GLUCONATE 213 G/1000ML
15 SOLUTION TOPICAL EVERY 12 HOURS
Refills: 0 | Status: DISCONTINUED | OUTPATIENT
Start: 2022-09-20 | End: 2022-09-21

## 2022-09-20 RX ORDER — CHLORHEXIDINE GLUCONATE 213 G/1000ML
1 SOLUTION TOPICAL
Refills: 0 | Status: DISCONTINUED | OUTPATIENT
Start: 2022-09-20 | End: 2022-09-28

## 2022-09-20 RX ORDER — ACETYLCYSTEINE 200 MG/ML
15 VIAL (ML) MISCELLANEOUS ONCE
Refills: 0 | Status: DISCONTINUED | OUTPATIENT
Start: 2022-09-20 | End: 2022-09-20

## 2022-09-20 RX ORDER — FOMEPIZOLE 1 G/ML
1500 INJECTION, SOLUTION INTRAVENOUS ONCE
Refills: 0 | Status: COMPLETED | OUTPATIENT
Start: 2022-09-20 | End: 2022-09-20

## 2022-09-20 RX ORDER — ENOXAPARIN SODIUM 100 MG/ML
40 INJECTION SUBCUTANEOUS EVERY 24 HOURS
Refills: 0 | Status: DISCONTINUED | OUTPATIENT
Start: 2022-09-20 | End: 2022-09-28

## 2022-09-20 RX ORDER — PANTOPRAZOLE SODIUM 20 MG/1
80 TABLET, DELAYED RELEASE ORAL ONCE
Refills: 0 | Status: COMPLETED | OUTPATIENT
Start: 2022-09-20 | End: 2022-09-20

## 2022-09-20 RX ORDER — PROPOFOL 10 MG/ML
30 INJECTION, EMULSION INTRAVENOUS
Qty: 1000 | Refills: 0 | Status: DISCONTINUED | OUTPATIENT
Start: 2022-09-20 | End: 2022-09-21

## 2022-09-20 RX ORDER — CEFTRIAXONE 500 MG/1
1000 INJECTION, POWDER, FOR SOLUTION INTRAMUSCULAR; INTRAVENOUS ONCE
Refills: 0 | Status: DISCONTINUED | OUTPATIENT
Start: 2022-09-20 | End: 2022-09-20

## 2022-09-20 RX ORDER — PROPOFOL 10 MG/ML
50 INJECTION, EMULSION INTRAVENOUS ONCE
Refills: 0 | Status: COMPLETED | OUTPATIENT
Start: 2022-09-20 | End: 2022-09-20

## 2022-09-20 RX ORDER — ACETYLCYSTEINE 200 MG/ML
5 VIAL (ML) MISCELLANEOUS ONCE
Refills: 0 | Status: DISCONTINUED | OUTPATIENT
Start: 2022-09-20 | End: 2022-09-20

## 2022-09-20 RX ORDER — SODIUM CHLORIDE 9 MG/ML
10 INJECTION INTRAMUSCULAR; INTRAVENOUS; SUBCUTANEOUS
Refills: 0 | Status: DISCONTINUED | OUTPATIENT
Start: 2022-09-20 | End: 2022-09-28

## 2022-09-20 RX ORDER — ETOMIDATE 2 MG/ML
40 INJECTION INTRAVENOUS ONCE
Refills: 0 | Status: COMPLETED | OUTPATIENT
Start: 2022-09-20 | End: 2022-09-20

## 2022-09-20 RX ORDER — ACTIVATED CHARCOAL 25 G/120ML
50 SUSPENSION, ORAL (FINAL DOSE FORM) ORAL ONCE
Refills: 0 | Status: COMPLETED | OUTPATIENT
Start: 2022-09-20 | End: 2022-09-20

## 2022-09-20 RX ORDER — PIPERACILLIN AND TAZOBACTAM 4; .5 G/20ML; G/20ML
3.38 INJECTION, POWDER, LYOPHILIZED, FOR SOLUTION INTRAVENOUS ONCE
Refills: 0 | Status: COMPLETED | OUTPATIENT
Start: 2022-09-20 | End: 2022-09-20

## 2022-09-20 RX ORDER — FOMEPIZOLE 1 G/ML
1500 INJECTION, SOLUTION INTRAVENOUS ONCE
Refills: 0 | Status: DISCONTINUED | OUTPATIENT
Start: 2022-09-20 | End: 2022-09-20

## 2022-09-20 RX ORDER — ACETYLCYSTEINE 200 MG/ML
15 VIAL (ML) MISCELLANEOUS ONCE
Refills: 0 | Status: COMPLETED | OUTPATIENT
Start: 2022-09-20 | End: 2022-09-20

## 2022-09-20 RX ORDER — ACETYLCYSTEINE 200 MG/ML
16800 VIAL (ML) MISCELLANEOUS ONCE
Refills: 0 | Status: COMPLETED | OUTPATIENT
Start: 2022-09-20 | End: 2022-09-20

## 2022-09-20 RX ORDER — ACTIVATED CHARCOAL 25 G/120ML
50 SUSPENSION, ORAL (FINAL DOSE FORM) ORAL ONCE
Refills: 0 | Status: DISCONTINUED | OUTPATIENT
Start: 2022-09-20 | End: 2022-09-20

## 2022-09-20 RX ORDER — SODIUM CHLORIDE 9 MG/ML
1000 INJECTION INTRAMUSCULAR; INTRAVENOUS; SUBCUTANEOUS ONCE
Refills: 0 | Status: COMPLETED | OUTPATIENT
Start: 2022-09-20 | End: 2022-09-20

## 2022-09-20 RX ORDER — CHLORHEXIDINE GLUCONATE 213 G/1000ML
1 SOLUTION TOPICAL
Refills: 0 | Status: DISCONTINUED | OUTPATIENT
Start: 2022-09-20 | End: 2022-09-21

## 2022-09-20 RX ORDER — FOMEPIZOLE 1 G/ML
1200 INJECTION, SOLUTION INTRAVENOUS EVERY 12 HOURS
Refills: 0 | Status: DISCONTINUED | OUTPATIENT
Start: 2022-09-21 | End: 2022-09-21

## 2022-09-20 RX ORDER — ROCURONIUM BROMIDE 10 MG/ML
100 VIAL (ML) INTRAVENOUS ONCE
Refills: 0 | Status: COMPLETED | OUTPATIENT
Start: 2022-09-20 | End: 2022-09-20

## 2022-09-20 RX ORDER — NOREPINEPHRINE BITARTRATE/D5W 8 MG/250ML
0.05 PLASTIC BAG, INJECTION (ML) INTRAVENOUS
Qty: 8 | Refills: 0 | Status: DISCONTINUED | OUTPATIENT
Start: 2022-09-20 | End: 2022-09-21

## 2022-09-20 RX ORDER — PANTOPRAZOLE SODIUM 20 MG/1
40 TABLET, DELAYED RELEASE ORAL EVERY 12 HOURS
Refills: 0 | Status: DISCONTINUED | OUTPATIENT
Start: 2022-09-20 | End: 2022-09-21

## 2022-09-20 RX ORDER — SODIUM BICARBONATE 1 MEQ/ML
50 SYRINGE (ML) INTRAVENOUS ONCE
Refills: 0 | Status: COMPLETED | OUTPATIENT
Start: 2022-09-20 | End: 2022-09-20

## 2022-09-20 RX ADMIN — PIPERACILLIN AND TAZOBACTAM 3.38 GRAM(S): 4; .5 INJECTION, POWDER, LYOPHILIZED, FOR SOLUTION INTRAVENOUS at 15:30

## 2022-09-20 RX ADMIN — PIPERACILLIN AND TAZOBACTAM 200 GRAM(S): 4; .5 INJECTION, POWDER, LYOPHILIZED, FOR SOLUTION INTRAVENOUS at 15:00

## 2022-09-20 RX ADMIN — Medication 11.3 MICROGRAM(S)/KG/MIN: at 15:32

## 2022-09-20 RX ADMIN — SODIUM CHLORIDE 1000 MILLILITER(S): 9 INJECTION INTRAMUSCULAR; INTRAVENOUS; SUBCUTANEOUS at 13:30

## 2022-09-20 RX ADMIN — PROPOFOL 21.6 MICROGRAM(S)/KG/MIN: 10 INJECTION, EMULSION INTRAVENOUS at 14:03

## 2022-09-20 RX ADMIN — Medication 16800 MILLIGRAM(S): at 18:19

## 2022-09-20 RX ADMIN — ENOXAPARIN SODIUM 40 MILLIGRAM(S): 100 INJECTION SUBCUTANEOUS at 23:58

## 2022-09-20 RX ADMIN — PROPOFOL 21.6 MICROGRAM(S)/KG/MIN: 10 INJECTION, EMULSION INTRAVENOUS at 21:02

## 2022-09-20 RX ADMIN — PROPOFOL 21.6 MICROGRAM(S)/KG/MIN: 10 INJECTION, EMULSION INTRAVENOUS at 23:59

## 2022-09-20 RX ADMIN — PROPOFOL 50 MILLIGRAM(S): 10 INJECTION, EMULSION INTRAVENOUS at 12:40

## 2022-09-20 RX ADMIN — CHLORHEXIDINE GLUCONATE 15 MILLILITER(S): 213 SOLUTION TOPICAL at 17:35

## 2022-09-20 RX ADMIN — Medication 325 GRAM(S): at 16:14

## 2022-09-20 RX ADMIN — Medication 50 GRAM(S): at 18:29

## 2022-09-20 RX ADMIN — Medication 50 MILLIEQUIVALENT(S): at 14:40

## 2022-09-20 RX ADMIN — MIDAZOLAM HYDROCHLORIDE 2 MILLIGRAM(S): 1 INJECTION, SOLUTION INTRAMUSCULAR; INTRAVENOUS at 23:58

## 2022-09-20 RX ADMIN — Medication 100 MILLIGRAM(S): at 12:32

## 2022-09-20 RX ADMIN — PANTOPRAZOLE SODIUM 80 MILLIGRAM(S): 20 TABLET, DELAYED RELEASE ORAL at 13:57

## 2022-09-20 RX ADMIN — SODIUM CHLORIDE 2000 MILLILITER(S): 9 INJECTION INTRAMUSCULAR; INTRAVENOUS; SUBCUTANEOUS at 14:40

## 2022-09-20 RX ADMIN — SODIUM CHLORIDE 1000 MILLILITER(S): 9 INJECTION INTRAMUSCULAR; INTRAVENOUS; SUBCUTANEOUS at 12:30

## 2022-09-20 RX ADMIN — ETOMIDATE 40 MILLIGRAM(S): 2 INJECTION INTRAVENOUS at 12:30

## 2022-09-20 RX ADMIN — SODIUM CHLORIDE 2000 MILLILITER(S): 9 INJECTION INTRAMUSCULAR; INTRAVENOUS; SUBCUTANEOUS at 13:50

## 2022-09-20 RX ADMIN — PANTOPRAZOLE SODIUM 40 MILLIGRAM(S): 20 TABLET, DELAYED RELEASE ORAL at 19:52

## 2022-09-20 RX ADMIN — FOMEPIZOLE 203 MILLIGRAM(S): 1 INJECTION, SOLUTION INTRAVENOUS at 17:29

## 2022-09-20 NOTE — CONSULT NOTE ADULT - SUBJECTIVE AND OBJECTIVE BOX
MEDICAL TOXICOLOGY CONSULT    HPI: "Pt is a 36 yo M, with PMHx of bipolar disorder, who was BIBEMS after family found him unconscious with concern for severe acetaminophen ingestion. Patient was last seen yesterday evening around 2230 drinking alcohol. This morning, patient was found on the floor, unresponsive to stimuli, and covered in black emesis. There were pill bottles around him including acetaminophen, olanzapine, gabapentin, valproate and oxycodone. Patient was intubated in ED, given NaHCO3 50 mEq, CTX, and Zosyn.    ONSET / TIME of exposure(s): Unknown    QUANTITY of exposure(s): Unknown    ROUTE of exposure: Ingestion, presumed    CONTEXT of exposure: At home    ASSOCIATED symptoms: Unresponsive, emesis    PAST MEDICAL & SURGICAL HISTORY:  Anxiety      Post traumatic stress disorder (PTSD)      Bipolar disorder      Polysubstance abuse      No significant past surgical history      No significant past surgical history          MEDICATION HISTORY:  acetylcysteine  Oral Solution 45390 milliGRAM(s) Oral once  acetylcysteine 30 Gram(s),Dextrose 5% in Water 1000 milliLiter(s) 30 Gram(s) IV Continuous <Continuous>  chlorhexidine 0.12% Liquid 15 milliLiter(s) Oral Mucosa every 12 hours  chlorhexidine 2% Cloths 1 Application(s) Topical <User Schedule>  fomepizole IVPB 1500 milliGRAM(s) IV Intermittent once  norepinephrine Infusion 0.05 MICROgram(s)/kG/Min IV Continuous <Continuous>  propofol Infusion 30 MICROgram(s)/kG/Min IV Continuous <Continuous>      FAMILY HISTORY:  No pertinent family history in first degree relatives        REVIEW OF SYSTEMS:   Unable to perform due to intoxication, dementia, or illness      Vital Signs Last 24 Hrs  T(C): 33.4 (20 Sep 2022 16:10), Max: 33.4 (20 Sep 2022 16:10)  T(F): 92.1 (20 Sep 2022 16:10), Max: 92.1 (20 Sep 2022 16:10)  HR: 63 (20 Sep 2022 16:10) (61 - 116)  BP: 100/65 (20 Sep 2022 16:10) (88/58 - 153/87)  BP(mean): 78 (20 Sep 2022 16:10) (71 - 79)  RR: 26 (20 Sep 2022 16:10) (16 - 28)  SpO2: 99% (20 Sep 2022 16:10) (97% - 100%)    Parameters below as of 20 Sep 2022 16:10  Patient On (Oxygen Delivery Method): ventilator    O2 Concentration (%): 40    SIGNIFICANT LABORATORY STUDIES:                        17.1   22.02 )-----------( 306      ( 20 Sep 2022 13:17 )             49.3           141  |  105  |  17.7  ----------------------------<  153<H>  3.7   |  12.0<L>  |  0.96    Ca    9.2      20 Sep 2022 13:17  Mg     2.3         TPro  7.8  /  Alb  4.3  /  TBili  1.0  /  DBili  x   /  AST  95<H>  /  ALT  100<H>  /  AlkPhos  80        PT/INR - ( 20 Sep 2022 16:20 )   PT: 16.3 sec;   INR: 1.40 ratio         PTT - ( 20 Sep 2022 16:20 )  PTT:23.5 sec    Urinalysis Basic - ( 20 Sep 2022 12:56 )    Color: Yellow / Appearance: Clear / S.025 / pH: x  Gluc: x / Ketone: Trace  / Bili: Negative / Urobili: Negative mg/dL   Blood: x / Protein: 100 mg/dL / Nitrite: Negative   Leuk Esterase: Negative / RBC: 3-5 /HPF / WBC 0-2 /HPF   Sq Epi: x / Non Sq Epi: Few / Bacteria: Few        Anion Gap: --  @ 16:20  CK: --  @ 16:20  Troponin:  --   @ 16:20  Pro-BNP:  --   @ 16:20  VB<H>   @ 16:20  Carboxyhemoglobin %:  --   @ 16:20  Methemoglobin %:  --   @ 16:20  Osmolality Serum:  --   @ 16:20  Aspirin Level: --   @ 16:20  Acetaminophen Level:  --   @ 16:20  Ethanol Level:  --   @ 16:20  Digoxin Level:  --   @ 16:20  Phenytoin Level:  --   @ 16:20  Carbamazepine level:  --   @ 16:20  Lamotrigine level:  --   @ 16:20  Anion Gap: --  @ 14:48  CK: --  @ 14:48  Troponin:  --   @ 14:48  Pro-BNP:  --   @ 14:48  VB<H>   @ 14:48  Carboxyhemoglobin %:  --  :48  Methemoglobin %:  --   @ 14:48  Osmolality Serum:  --   @ 14:48  Aspirin Level: --   @ 14:48  Acetaminophen Level:  --   @ 14:48  Ethanol Level:  --   @ 14:48  Digoxin Level:  --   @ :48  Phenytoin Level:  --   @ :48  Carbamazepine level:  --   @ 14:48  Lamotrigine level:  --   @ 14:48  Anion Gap: 24<H>  @ 13:17  CK: 735<H>  @ 13:17  Troponin:  --   @ 13:17  Pro-BNP:  --   @ 13:17  VBG:  --   @ 13:17  Carboxyhemoglobin %:  --   @ 13:17  Methemoglobin %:  --   @ 13:17  Osmolality Serum:  --   @ 13:17  Aspirin Level: <0.6<L>   @ 13:17  Acetaminophen Level:  >600.0<HH>   @ 13:17  Ethanol Level:  --   @ 13:17  Digoxin Level:  --   @ 13:17  Phenytoin Level:  --   @ 13:17  Carbamazepine level:  --   @ 13:17  Lamotrigine level:  --   @ 13:17  Anion Gap: --  @ 12:56  CK: --  @ 12:56  Troponin:  --   @ :56  Pro-BNP:  --   12:56  VB   @ 12:56  Carboxyhemoglobin %:  --  :56  Methemoglobin %:  --   @ 12:56  Osmolality Serum:  --   @ 12:56  Aspirin Level: --   @ 12:56  Acetaminophen Level:  --   @ 12:56  Ethanol Level:  --   @ :56  Digoxin Level:  --   @ :56  Phenytoin Level:  --   @ :56  Carbamazepine level:  --   @ 12:56  Lamotrigine level:  --   @ 12:56

## 2022-09-20 NOTE — H&P ADULT - HISTORY OF PRESENT ILLNESS
36 y/o M with a h/o bipolar disorder, presents to the ED after being found unresponsive covered in black emesis in his basement apartment today by family. Last seen in usual state of health around 2230 last night, but was drinking alcohol heavily. Per EMS report, family stated pt has a history of polysubstance abuse (mixing pills). No witnessed ingestion but multiple pill bottles found including Tylenol, valproate, olanzapine, oxycodone, gabapentin, and other OTCs were found near his person. Acetaminophen level > 600. Profound AG-metabolic acidosis. Intubated for airway protection. Developed shock state requiring IV vasopressor therapy.

## 2022-09-20 NOTE — CONSULT NOTE ADULT - ASSESSMENT
The patient is a 37 year old male with psych history since of 13 which includes bipolar with psychotic features, post traumatic stress disorder, prior history of suicidal ideation, with history of multiple admissions, whom (per documentation) was last seen last evening at ~2230 drinking heavily. He was found unresponsive in his apartment and covered in black emesis surrounded by pill bottles containing acetaminophen, valproate, olanzapine, oxycodone, gabapentin, and other OTCs. Per documentation, family stated that the patient has a history of overdosing on pills. He was emergently intubated in the ED due to obtundation/airway protection. Labs showed severely elevated acetaminophen levels of > 600. NAC ordered, pending administration. Nephrology was consulted for evaluation for renal replacement therapy given high anion gap metabolic acidosis.     -High anion gap metabolic acidosis in setting of lactic acidosis + acetaminophen toxicity (5-oxoproline)  -Acetaminophen level > 600  -Lactic acid 7.4; latest improved to 3.1   -s/p 3L NS bolus  -s/p activated charcoal; currently getting N-acetylcysteine IV  -Baseline creatinine is ~0.7mg/dL (in August 2022)  -Renal function remains at baseline; latest creatinine is 0.9mg/dL; non oliguric   -Potassium is WNL   -Acid base disorder as documented above - of note, prefer to have ABG x 1 (for baseline purposes)  -Would check serum osm for completeness   -Medical management of acetaminophen toxicity (as documented above)  -No indication for renal replacement therapy at this time     Maritza Brown DO  Nephrology      The patient is a 37 year old male with psych history since of 13 which includes bipolar with psychotic features, post traumatic stress disorder, prior history of suicidal ideation, with history of multiple admissions, whom (per documentation) was last seen last evening at ~2230 drinking heavily. He was found unresponsive in his apartment and covered in black emesis surrounded by pill bottles containing acetaminophen, valproate, olanzapine, oxycodone, gabapentin, and other OTCs. Per documentation, family stated that the patient has a history of overdosing on pills. He was emergently intubated in the ED due to obtundation/airway protection. Labs showed severely elevated acetaminophen levels of > 600. NAC ordered, pending administration. Nephrology was consulted for evaluation for renal replacement therapy given high anion gap metabolic acidosis.     -High anion gap metabolic acidosis in setting of lactic acidosis + acetaminophen toxicity (5-oxoproline)  -Acetaminophen level > 600  -Lactic acid 7.4; latest improved to 3.1   -s/p 3L NS bolus  -s/p activated charcoal; currently getting N-acetylcysteine IV  -Baseline creatinine is ~0.7mg/dL (in August 2022)  -Renal function remains at baseline; latest creatinine is 0.9mg/dL; non oliguric   -Potassium is WNL   -Acid base disorder as documented above - of note, prefer to have ABG x 1 (for baseline purposes)  -Would check serum osm for completeness   -Medical management of acetaminophen toxicity (as documented above)  -No indication for renal replacement therapy at this time     ADDENDUM (09/20/2022 at 6pm): Labs reviewed - notable for high osmolar gap. Given such, renal replacement therapy is warranted at this time. Risks/benefits discussed with patient's father whom was at bedside. Consent signed/witnessed and placed in chart. Full dialysis treatment tonight; will reassess tomorrow. Likely to need up titration of pressor (currently on levophed) - may need addition of second pressor to tolerate HD (without UF).     Maritza Brown,   Nephrology      The patient is a 37 year old male with psych history since of 13 which includes bipolar with psychotic features, post traumatic stress disorder, prior history of suicidal ideation, with history of multiple admissions, whom (per documentation) was last seen last evening at ~2230 drinking heavily. He was found unresponsive in his apartment and covered in black emesis surrounded by pill bottles containing acetaminophen, valproate, olanzapine, oxycodone, gabapentin, and other OTCs. Per documentation, family stated that the patient has a history of overdosing on pills. He was emergently intubated in the ED due to obtundation/airway protection. Labs showed severely elevated acetaminophen levels of > 600. NAC ordered, pending administration. Nephrology was consulted for evaluation for renal replacement therapy given high anion gap metabolic acidosis.     -High anion gap metabolic acidosis in setting of lactic acidosis + acetaminophen toxicity (5-oxoproline)  -Acetaminophen level > 600  -Lactic acid 7.4; latest improved to 3.1   -s/p 3L NS bolus  -s/p activated charcoal; currently getting N-acetylcysteine IV  -Baseline creatinine is ~0.7mg/dL (in August 2022)  -Renal function remains at baseline; latest creatinine is 0.9mg/dL; non oliguric   -Potassium is WNL   -Acid base disorder as documented above - of note, prefer to have ABG x 1 (for baseline purposes)  -Would check serum osm for completeness   -Medical management of acetaminophen toxicity (as documented above)  -No indication for renal replacement therapy at this time     ADDENDUM (09/20/2022 at 6pm): Labs reviewed - notable for high osmolar gap. Given such, renal replacement therapy is warranted at this time. Risks/benefits discussed with patient's father whom was at bedside. Consent signed/witnessed and placed in chart. Full dialysis treatment tonight; will reassess tomorrow. Likely to need up titration of pressor (currently on levophed) - may even need addition of second pressor to tolerate HD (without UF).     Maritza Brown, DO  Nephrology

## 2022-09-20 NOTE — ED PROVIDER NOTE - CLINICAL SUMMARY MEDICAL DECISION MAKING FREE TEXT BOX
Found unresponsive, ET intubation emergently for airway protection, found to have HAGMA with APAP level over the detectable limit. Will start NAC empirically, anticipate need for HD, will admit to micu for stabilization and treatment, nephrology consulted for HD. Pt not stable for transfer to transplant center at this time, LFTs only marginally elevated at this time.

## 2022-09-20 NOTE — H&P ADULT - ASSESSMENT
36 y/o M with a h/o bipolar disorder, polysubstance abuse, with:    Acetaminophen toxicity, profound AG-metabolic acidosis, distributive shock, acute respiratory failure, r/o GIB.    Apparent intentional toxic ingestion of acetaminophen (possibly with multiple other meds as outlined in HPI), unclear if this was a result of psychiatric illness vs suicide attempt.    - start N-acetylcysteine infusion protocol  - current lab work not indicative of liver failure, [facility] hepatology service deemed him not a candidate for transfer  - 1 amp of sodium bicarbonate x 1, increased RR to 22bpm on ventilator to increase minute ventilation for compensatory purposes  - repeat ABG improving, will hold off on bicarb gtt   - nephrology not offering hemodialysis at this time, monitor acid-base balance closely  - renal function, electrolytes and UOP are intact at this time  - suspect shock state is distributive in nature secondary to systemic vasodilation from severe acidosis + propofol  - s/p 3L IVF bolus, start norepinephrine infusion to maintain a MAP > 65  - actively titrating vent settings to maintain SpO2 > 92% and adequate minute ventilation  - CXR does not appear to show any acute lung infiltrates  - sedate with propfol to promote vent synchrony and patient comfort  - unclear if dark emesis is related to UGIB, H/H is stable, will start IV PPI BID for now    Case discussed in detail with MICU physician, Dr. Hodges.      CRITICAL CARE TIME SPENT: 68 mins  Time spent evaluating/treating patient with medical issues that pose a high risk for life threatening deterioration and/or end-organ damage, reviewing data/labs/imaging, discussing case with multidisciplinary team, discussing plan/goals of care with patient/family. Non-inclusive of procedure time.     36 y/o M with a h/o bipolar disorder, polysubstance abuse, with:    Acetaminophen toxicity, profound AG-metabolic acidosis, distributive shock, acute respiratory failure, r/o GIB.    Apparent intentional toxic ingestion of acetaminophen (possibly with multiple other meds as outlined in HPI), unclear if this was a result of psychiatric illness vs suicide attempt.    - start N-acetylcysteine infusion protocol  - current lab work not indicative of liver failure, [facility] hepatology service deemed him not a candidate for transfer  - 1 amp of sodium bicarbonate x 1, increased RR to 22bpm on ventilator to increase minute ventilation for compensatory purposes  - repeat ABG improving, will hold off on bicarb gtt   - nephrology not offering hemodialysis at this time, monitor acid-base balance closely  - renal function, electrolytes and UOP are intact at this time  - suspect shock state is distributive in nature secondary to systemic vasodilation from severe acidosis + propofol  - s/p 3L IVF bolus, start norepinephrine infusion to maintain a MAP > 65  - actively titrating vent settings to maintain SpO2 > 92% and adequate minute ventilation  - CXR does not appear to show any acute lung infiltrates  - sedate with propfol to promote vent synchrony and patient comfort  - unclear if dark emesis is related to UGIB, H/H is stable, will start IV PPI BID for now    ***UPDATE***  Patient's father, Darwin, arrived at hospital. Case discussed at length. He reports that the patient had been mixing multiple pills together in a container and ingesting them along with red wine as he has done in the past. No mention of suicidal ideation or the desire to harm himself. The red wine explains the dark liquid vomit- now much less suspicious for UGIB. Serum osmolality has resulted at 318, given this nephrology has elected to initiate hemodialysis.    Case discussed in detail with MICU physician, Dr. Hodges.      CRITICAL CARE TIME SPENT: 68 mins  Time spent evaluating/treating patient with medical issues that pose a high risk for life threatening deterioration and/or end-organ damage, reviewing data/labs/imaging, discussing case with multidisciplinary team, discussing plan/goals of care with patient/family. Non-inclusive of procedure time.

## 2022-09-20 NOTE — ED ADULT TRIAGE NOTE - CHIEF COMPLAINT QUOTE
pt arrives by EMS unresponsive, pt last seen 5pm by parents, family reported pt has hx drinking alcohol and mixing tylenol and ASA, hx GI bleed. was found with coffee ground emesis,

## 2022-09-20 NOTE — CONSULT NOTE ADULT - PROBLEM SELECTOR RECOMMENDATION 9
#Massive acetaminophen overdose  - Give activated charcoal 50 g via NG tube  - Start enteral AND parenteral NAC      - Give enteral NAC via NG tube 140 mg/kg loading dose. If she continues to have active bowel sounds without concern for gastroparesis, ileus, or obstruction, we can continue enteral NAC 70 mg/kg every 4 hours.      - Give IV  mg/kg (max 15 grams) loading dose over 1 hour. After that, give IV NAC 25 mg/kg/hr indefinitely until tylenol level is undetectable and the LFTs are at least below 50% of the peak (which we have yet to see)      - Obtain tylenol, LFTs, and VBG at least Q4 hours  - Start fomepizole 15 mg/kg (max 1.5 gram) IV loading dose. Then, give 10 mg/kg every 12 hours for 4 doses. May need continued fomepizole depending on clinical course.  - We recommend starting hemodialysis to facilitate acetaminophen clearance and for metabolic derangements if amendable by the nephrology team  - Continue great supportive care as per primary team.  - Toxicology team will continue to follow the patient, please do not hesitate to contact us for any developments, questions, or concerns.    Assessment and plan discussed with toxicology attending Dr. Urena. Please reach out to the toxicology team for any further questions or concerns.    Ryan Mckeon MD  Toxicology fellow, PGY4

## 2022-09-20 NOTE — ED PROVIDER NOTE - CARE PLAN
Principal Discharge DX:	Acetaminophen overdose  Secondary Diagnosis:	High anion gap metabolic acidosis  Secondary Diagnosis:	Acute respiratory failure with hypoxia   1

## 2022-09-20 NOTE — H&P ADULT - NS PANP COMMENT GEN_ALL_CORE FT
38-year-old with bipolar disorder and polysubstance abuse alcohol use disorder presented with altered mental status with acetaminophen overdose anion gap metabolic acidosis distributive shock severe dehydration being admitted to medical ICU for acute toxic encephalopathy rule out acute intracerebral pathology, anion gap metabolic acidosis, AARON, transaminitis.  Intubated on mechanical ventilator with target minute ventilation 10 L as possible trending ABG closely  N-acetylcysteine with activated charcoal as per protocol: Toxicology consulted with serial Tylenol level, transplant eval/hepatology eval consult placed by ER team recommended to continue management medically for now and to reach out again if clinically worsen  Serial CMP mag Phos PT/INR ammonia every 6 hours  S/p IV fluid currently on norepinephrine   h improvement in sodium bicarb with initial IV push   hemodialysis management as per nephrology team     plan discussed by medical ICU team to the patient family

## 2022-09-20 NOTE — CONSULT NOTE ADULT - ASSESSMENT
"Pt is a 38 yo M, with PMHx of bipolar disorder, who was BIBEMS after family found him unconscous with concern for severe acetaminophen ingestion. Patient was last seen yesterday evening around 2230 drinking alcohol. This morning, patient was found on the floor, unresponsive to stimuli, and covered in black emesis. There were pill bottles around him including acetaminophen, olanzapine, gabapentin, valproat, and oxycodone. Patient was intubated after arrival. Found to be hypothermic, tachycardic, tachypneic, and hypertensive. Labs showed severe anion gap metabolic acidosis with lactatemia, mild prothrombinemia, leukocytosis with bandemia, and elevated CK. CT head and neck shows no acute intracranial pathologies. After discussion with the lab, an unofficial and unverified serum acetaminophen level was 707 ug/mL (the machine is only verified up to 600, but the machine can technically test up to 1000).     Metabolism of acetaminophen by CYP enzymes produce highly toxic metabolite NAPQI which is usually detoxified by glutathione, but in verdose NAPQI exceeds the capacity of glutathione detoxification. NAPQI accumulation causes liver injury, and at times renal damage. In massive overdose such as this case, patients can develop severe acidosis and AMS likely due to mitochondrial dysfunction and lactate production. Fomepizole is used to inhibit cytochrome 2E1 to reduce the formation of NAPQI.

## 2022-09-20 NOTE — ED PROVIDER NOTE - OBJECTIVE STATEMENT
37yom w/ bipolar was found unresponsive in his basement apartment today by family. Last seen around 2230 last night, was drinking alcohol heavily. Per EMS report, family stated pt has a history of taking overdoses of pills. No known ingestion today but multiple pill bottles found including tylenol, valproate, olanzapine, oxycodone, and other OTCs. Was found on a tile floor unresponsive to all stimuli, covered in black emesis. 37yom w/ bipolar was found unresponsive in his basement apartment today by family. Last seen around 2230 last night, was drinking alcohol heavily. Per EMS report, family stated pt has a history of taking overdoses of pills. No known ingestion today but multiple pill bottles found including tylenol, valproate, olanzapine, oxycodone, gabapentin, and other OTCs. Was found on a tile floor unresponsive to all stimuli, covered in black emesis.

## 2022-09-20 NOTE — ED PROCEDURE NOTE - CPROC ED TIME OUT STATEMENT1
“Patient's name, , procedure and correct site were confirmed during the [place] Timeout.”
“Patient's name, , procedure and correct site were confirmed during the [place] Timeout.”

## 2022-09-20 NOTE — H&P ADULT - RESPIRATORY
clear to auscultation bilaterally/no wheezes/no rales/no rhonchi/no respiratory distress/airway patent/breath sounds equal/good air movement/respirations non-labored

## 2022-09-20 NOTE — ED ADULT NURSE REASSESSMENT NOTE - NS ED NURSE REASSESS COMMENT FT1
pt A&Ox0 pt having no purposeful movements,  intubated resp even and unlabored , rectal temp 91.2 MD Cardenas made aware and bare hugger placed

## 2022-09-20 NOTE — ED PROVIDER NOTE - CHIEF COMPLAINT
eMERGENCY dEPARTMENT eNCOUnter      [address]    Chief Complaint   Patient presents with   â¢ Palpitations   â¢ Dizziness   â¢ Breathing Problem       YANELI Schwartz is a 46year old female who presents with chest pain. The patient was at baseline until last night at 2230. She noted palpitations and tachycardia. She tried her PO Tenormin and Flecanide as directed without improvement. No syncope, some nonspecific chest discomfort and dyspnea. She feels anxious. No fever. No GI or  complaints. No leg edema. She sees EP for SVT and has ablations for WPW. ALLERGIES    ALLERGIES:   Allergen Reactions   â¢ Augmentin [Amoclan] MYALGIA   â¢ Imuran [Azathioprine Sodium] GI UPSET, DIARRHEA and Nausea & Vomiting   â¢ Morphine PRURITUS       CURRENT MEDICATIONS    No current facility-administered medications for this encounter. Current Outpatient Medications   Medication Sig Dispense Refill   â¢ mercaptopurine (PURINETHOL) 50 MG tablet Take 1 tablet by mouth daily. 90 tablet 3   â¢ magnesium gluconate (MAGONATE) 500 MG tablet Take 500 mg by mouth daily. â¢ traMADol (ULTRAM) 50 MG tablet Take 1 tablet by mouth every 8 hours as needed for Pain. Do not start before May 10, 2019. 90 tablet 0   â¢ Turmeric 500 MG Tab Take 500 mg by mouth 2 times daily. â¢ Milk Thistle 1000 MG Cap Take by mouth 2 times daily. â¢ flecainide (TAMBOCOR) 100 MG tablet Take 1 tablet by mouth as needed (take one pill with a dose of atenolol when AFL occurs. ). 10 tablet 3   â¢ atenolol (TENORMIN) 25 MG tablet Take 0.5 tablets by mouth as needed (Take 12.5 mg with Flecainide when AFL occurs. ). 10 tablet 3   â¢ Cholecalciferol (VITAMIN D3) 5000 UNITS Cap Take 5,000 Units by mouth daily. â¢ aspirin 81 MG tablet Take 81 mg by mouth daily. â¢ vitamin - therapeutic multivitamins w/minerals (CENTRUM SILVER,THERA-M) TABS Take 1 tablet by mouth daily.      â¢ sertraline (ZOLOFT) 50 MG tablet TAKE 1 TABLET DAILY 90 tablet 1   â¢ meloxicam (MOBIC) 15 MG tablet TAKE 1 TABLET DAILY 90 tablet 2   â¢ benzonatate (TESSALON PERLES) 200 MG capsule Take 1 capsule by mouth 3 times daily as needed for Cough. 20 capsule 0   â¢ sertraline (ZOLOFT) 50 MG tablet Take 1 tablet by mouth daily. 90 tablet 3       PAST MEDICAL HISTORY    Past Medical History:   Diagnosis Date   â¢ Anxiety    â¢ Arthritis    â¢ Atrial fibrillation (CMS/HCC)    â¢ Bronchitis    â¢ Diverticulitis    â¢ Liver disease     auto-immune hepatitis   â¢ Other chronic pain     back pain   â¢ PONV (postoperative nausea and vomiting)    â¢ Shingles    â¢ Urinary tract infection    â¢ WPW (Sina-Parkinson-White syndrome)        SURGICAL HISTORY    Past Surgical History:   Procedure Laterality Date   â¢ Back surgery     â¢ Colonoscopy  2013   â¢ Cosm lcsc exc excessive skin & subq tiss arm lift  2013   â¢ Dilation and curettage of uterus  2016   â¢ Endometrial ablation  2008    Dr Roma Pulliam, with D and C   â¢ Ep ablation      for WPW   â¢ Foot surgery Right     Dr Esme Ferris   â¢ Forearm surgery Left 2015    radial tunnel decompression   â¢ Hernia repair     â¢ Hysterectomy  2016    vaginal hysterectomy   â¢ Knee arthroscopy w/ debridement Right 2015   â¢ Knee arthroscopy w/ laser Left 2016    Dr. Amelia Quintana   â¢ Laminectomy  2014    right T6-7 hemilaminectomy, foraminotomy, and discectomy   â¢ Radiofrequency ablation  2013    afib   â¢ Removal spleen, total     â¢ Shoulder arthroscopy  2011    bilateral shoulder right then left   â¢ Skin surgery  2013    bilateral arm brachioplasty   â¢ Splenectomy, total         SOCIAL HISTORY    Social History     Tobacco Use   â¢ Smoking status: Former Smoker     Types: Cigarettes     Last attempt to quit: 1990     Years since quittin.4   â¢ Smokeless tobacco: Never Used   Substance Use Topics   â¢ Alcohol use: Yes     Alcohol/week: 0.0 oz     Comment: once a month 1-2 glasses of wine   â¢ Drug use:  No FAMILY HISTORY    Family History   Problem Relation Age of Onset   â¢ Diabetes Mother    â¢ Hypertension Mother    â¢ Cataracts Mother    â¢ Stroke Father    â¢ High cholesterol Father    â¢ Diabetes Brother        REVIEW OF SYSTEMS    Thirteen systems reviewed and negative aside from what is listed in HPI. PHYSICAL EXAM    ED Triage Vitals   BP 06/03/19 0045 123/68   Pulse 06/03/19 0045 133   Resp 06/03/19 0049 19   Temp 06/03/19 0049 98 Â°F (36.7 Â°C)   SpO2 06/03/19 0045 100 %   Pulse Ox Interpretation:  Normal on ambient air. Constitutional:  Well developed, well nourished, no acute distress, non-toxic appearance. HENT:  Atraumatic. External ears normal. Nose normal. Oropharynx moist.  Neck: Normal range of motion. No tenderness. Supple. Respiratory: Clear to auscultation bilaterally with no increased work of breathing  Cardiovascular:  Tachycardic rate and irregular rhythm with no obvious murmurs rubs or gallops. DP, PT, and radial pulses palpable and symmetric. Chest: Normal precordium with nondisplaced PMI  GI:  Soft, nondistended. Nontender. No hepatomegaly, splenomegaly, mass, rebound pr guarding. Musculoskeletal:  No edema. No tenderness. No deformities. Integument:  Well hydrated, no rash. Neurologic:  Alert. Normal motor function. Normal sensory function. No focal deficits noted. EKG    AF with RVR, rate 132, ST changes inferiorly thought to be rate related. Repeat EKG at 0223 is Sinus with rate of 86 and normal intervals.     RADIOLOGY  -  No orders to display     LABS    Labs Reviewed   CBC & AUTO DIFFERENTIAL - Abnormal; Notable for the following components:       Result Value    RDW-CV 15.3 (*)     Absolute Mono 1.0 (*)     All other components within normal limits   COMPREHENSIVE METABOLIC PANEL - Abnormal; Notable for the following components:    Chloride 109 (*)     BUN 23 (*)     Creatinine 1.08 (*)     All other components within normal limits   NT PROBNP - Abnormal; Notable "for the following components:    NT proBNP 143 (*)     All other components within normal limits   TROPONIN I ULTRA SENSITIVE   PROTHROMBIN TIME   PARTIAL THROMBOPLASTIN TIME   LIPASE LEVEL   MAGNESIUM LEVEL   THYROID STIMULATING HORMONE REFLEX   The diagnostic studies ordered were interpreted by Aric Lr DO incident to care on 6/3/2019. ED COURSE & MEDICAL DECISION MAKING    Patient here with chest pain. Differential diagnoses includes coronary artery disease including ischemia or infarction, pneumonia, pulmonary embolism (PE), pneumothorax, great vessel disease, referred pain. Interventions: Examination, patient teaching, Labs, Meds  Status upon Re-Evaluation: Improved symptoms and continues to be hemodynamically stable and otherwise well-appearing. Vital sign interpretation:   Visit Vitals  /67   Pulse 84   Temp 98 Â°F (36.7 Â°C) (Oral)   Resp 19   Ht 5' 10"" (1.778 m)   Wt 110.4 kg   SpO2 95%   BMI 34.92 kg/mÂ²    Normal heart rate, normal respiratory rate,   acceptable blood pressure, and good oxygen saturation on room air. Disposition: Discharge with followup and reevaluation as necessary    The patient was apprised of the results, current diagnosis, current prognosis, and the plan. The patient was in agreement with the plan as it occurred and understood the risks and benefits of pursuing further diagnostic testing/treatment and preferred the plan as it is. Detailed discharge instructions including followup were provided and the patient communicated an understanding and acceptance of this. Diagnosis  The primary encounter diagnosis was Atypical atrial flutter (CMS/HCC). A diagnosis of Electrolyte and fluid disorder was also pertinent to this visit. Follow Up:   Julio Shore MD  [address]  [phone]      If symptoms worsen, As needed       Current Discharge Medication List          Disposition:  Discharge 6/3/2019  2:51 AM  Doll Bone " A Darwin discharge to home/self care.          Adi Polk DO  06/03/19 2192 The patient is a 37y Male complaining of unresponsive.

## 2022-09-20 NOTE — CONSULT NOTE ADULT - SUBJECTIVE AND OBJECTIVE BOX
The patient is a 37 year old male with psych history since of 13 which includes bipolar with psychotic features, post traumatic stress disorder, prior history of suicidal ideation, with history of multiple admissions, whom (per documentation) was last seen last evening at ~2230 drinking heavily. He was found unresponsive in his apartment and covered in black emesis surrounded by pill bottles containing acetaminophen, valproate, olanzapine, oxycodone, gabapentin, and other OTCs. Per documentation, family stated that the patient has a history of overdosing on pills. He was emergently intubated in the ED due to obtundation/airway protection. Labs showed severely elevated acetaminophen levels of > 600. NAC ordered, pending administration. Nephrology was consulted for evaluation for renal replacement therapy given high anion gap metabolic acidosis.     PAST MEDICAL & SURGICAL HISTORY:  Anxiety  Post traumatic stress disorder (PTSD)  No pertinent past medical history  No significant past surgical history  No significant past surgical history    Allergies  Abilify (Other)  Intolerances  Pt prefers Lactaid milk. (Unknown)    Home Medications: Unclear    FAMILY HISTORY:  No pertinent family history in first degree relatives    Social History: Unclear    ROS: Unable to obtain; intubated at time of my assessment    Vital Signs Last 24 Hrs  T(C): 33 (20 Sep 2022 15:35), Max: 33 (20 Sep 2022 15:35)  T(F): 91.4 (20 Sep 2022 15:35), Max: 91.4 (20 Sep 2022 15:35)  HR: 62 (20 Sep 2022 15:41) (61 - 116)  BP: 95/57 (20 Sep 2022 15:35) (88/58 - 153/87)  BP(mean): 71 (20 Sep 2022 15:35) (71 - 79)  RR: 26 (20 Sep 2022 15:35) (16 - 28)  SpO2: 100% (20 Sep 2022 15:41) (97% - 100%)    Parameters below as of 20 Sep 2022 15:35  Patient On (Oxygen Delivery Method): ventilator    O2 Concentration (%): 40    Physical Exam  General: Obese male in NAD  Cardiac: S1S2 RRR  Respiratory: Transmitted breath sounds b/l  Abdomen: Soft  Extremities: No edema  Neuro: RASS -5  : +Indwelling deal     09-20    141  |  105  |  17.7  ----------------------------<  153<H>  3.7   |  12.0<L>  |  0.96    Ca    9.2      20 Sep 2022 13:17  Mg     2.3     09-20    TPro  7.8  /  Alb  4.3  /  TBili  1.0  /  DBili  x   /  AST  95<H>  /  ALT  100<H>  /  AlkPhos  80  09-20               17.1   22.02 )-----------( 306      ( 20 Sep 2022 13:17 )             49.3     MEDICATIONS  (STANDING):  acetylcysteine  Oral Solution 21845 milliGRAM(s) Oral once  acetylcysteine IVPB 5 Gram(s) IV Intermittent once  acetylcysteine IVPB 10 Gram(s) IV Intermittent once  acetylcysteine IVPB 15 Gram(s) IV Intermittent once  chlorhexidine 0.12% Liquid 15 milliLiter(s) Oral Mucosa every 12 hours  norepinephrine Infusion 0.05 MICROgram(s)/kG/Min (11.3 mL/Hr) IV Continuous <Continuous>  propofol Infusion 30 MICROgram(s)/kG/Min (21.6 mL/Hr) IV Continuous <Continuous>    MEDICATIONS  (PRN):

## 2022-09-20 NOTE — ED ADULT NURSE NOTE - OBJECTIVE STATEMENT
found unresponsive with coffee ground emesis on face. BVM assisted via EMS, unresponsive to painful stimuli in triage. intubated for airway protection

## 2022-09-20 NOTE — H&P ADULT - NSICDXPASTMEDICALHX_GEN_ALL_CORE_FT
I have reviewed the history, physical exam, assessment and management plans.  I concur with or have edited all elements of her note., Patient's visit was conducted through phone communication. Patient consented before the start of visit as to understanding of privacy concerns, possible technological failure, and their responsibility of carrying out instructions of plan. PAST MEDICAL HISTORY:  Anxiety     Bipolar disorder     Polysubstance abuse     Post traumatic stress disorder (PTSD)

## 2022-09-20 NOTE — ED ADULT NURSE NOTE - NSIMPLEMENTINTERV_GEN_ALL_ED
Implemented All Fall Risk Interventions:  [place] to call system. Call bell, personal items and telephone within reach. Instruct patient to call for assistance. Room bathroom lighting operational. Non-slip footwear when patient is off stretcher. Physically safe environment: no spills, clutter or unnecessary equipment. Stretcher in lowest position, wheels locked, appropriate side rails in place. Provide visual cue, wrist band, yellow gown, etc. Monitor gait and stability. Monitor for mental status changes and reorient to person, place, and time. Review medications for side effects contributing to fall risk. Reinforce activity limits and safety measures with patient and family.

## 2022-09-20 NOTE — ED PROVIDER NOTE - ENMT, MLM
Airway with evidence of black emesis in oropharynx and on the face. Nasal mucosa clear. Mouth with normal mucosa.

## 2022-09-21 LAB
ALBUMIN SERPL ELPH-MCNC: 3.4 G/DL — SIGNIFICANT CHANGE UP (ref 3.3–5.2)
ALBUMIN SERPL ELPH-MCNC: 3.7 G/DL — SIGNIFICANT CHANGE UP (ref 3.3–5.2)
ALBUMIN SERPL ELPH-MCNC: 3.7 G/DL — SIGNIFICANT CHANGE UP (ref 3.3–5.2)
ALBUMIN SERPL ELPH-MCNC: 4 G/DL — SIGNIFICANT CHANGE UP (ref 3.3–5.2)
ALP SERPL-CCNC: 64 U/L — SIGNIFICANT CHANGE UP (ref 40–120)
ALP SERPL-CCNC: 66 U/L — SIGNIFICANT CHANGE UP (ref 40–120)
ALP SERPL-CCNC: 67 U/L — SIGNIFICANT CHANGE UP (ref 40–120)
ALP SERPL-CCNC: 71 U/L — SIGNIFICANT CHANGE UP (ref 40–120)
ALT FLD-CCNC: 1152 U/L — HIGH
ALT FLD-CCNC: 152 U/L — HIGH
ALT FLD-CCNC: 188 U/L — HIGH
ALT FLD-CCNC: 2911 U/L — HIGH
ANION GAP SERPL CALC-SCNC: 15 MMOL/L — SIGNIFICANT CHANGE UP (ref 5–17)
ANION GAP SERPL CALC-SCNC: 19 MMOL/L — HIGH (ref 5–17)
ANION GAP SERPL CALC-SCNC: 20 MMOL/L — HIGH (ref 5–17)
ANION GAP SERPL CALC-SCNC: 27 MMOL/L — HIGH (ref 5–17)
APAP SERPL-MCNC: 102.9 UG/ML — HIGH (ref 10–26)
APAP SERPL-MCNC: 154.3 UG/ML — CRITICAL HIGH (ref 10–26)
APAP SERPL-MCNC: 19 UG/ML — SIGNIFICANT CHANGE UP (ref 10–26)
APAP SERPL-MCNC: 41.8 UG/ML — HIGH (ref 10–26)
AST SERPL-CCNC: 1130 U/L — HIGH
AST SERPL-CCNC: 171 U/L — HIGH
AST SERPL-CCNC: 196 U/L — HIGH
AST SERPL-CCNC: 2835 U/L — HIGH
BASE EXCESS BLDV CALC-SCNC: -0.4 MMOL/L — SIGNIFICANT CHANGE UP (ref -2–3)
BASOPHILS # BLD AUTO: 0.03 K/UL — SIGNIFICANT CHANGE UP (ref 0–0.2)
BASOPHILS NFR BLD AUTO: 0.2 % — SIGNIFICANT CHANGE UP (ref 0–2)
BILIRUB DIRECT SERPL-MCNC: 0.3 MG/DL — SIGNIFICANT CHANGE UP (ref 0–0.3)
BILIRUB INDIRECT FLD-MCNC: 0.8 MG/DL — SIGNIFICANT CHANGE UP (ref 0.2–1)
BILIRUB SERPL-MCNC: 0.9 MG/DL — SIGNIFICANT CHANGE UP (ref 0.4–2)
BILIRUB SERPL-MCNC: 1 MG/DL — SIGNIFICANT CHANGE UP (ref 0.4–2)
BILIRUB SERPL-MCNC: 1.1 MG/DL — SIGNIFICANT CHANGE UP (ref 0.4–2)
BILIRUB SERPL-MCNC: 1.5 MG/DL — SIGNIFICANT CHANGE UP (ref 0.4–2)
BUN SERPL-MCNC: 11.8 MG/DL — SIGNIFICANT CHANGE UP (ref 8–20)
BUN SERPL-MCNC: 15 MG/DL — SIGNIFICANT CHANGE UP (ref 8–20)
BUN SERPL-MCNC: 8 MG/DL — SIGNIFICANT CHANGE UP (ref 8–20)
BUN SERPL-MCNC: 9.5 MG/DL — SIGNIFICANT CHANGE UP (ref 8–20)
CALCIUM SERPL-MCNC: 8.5 MG/DL — SIGNIFICANT CHANGE UP (ref 8.4–10.5)
CALCIUM SERPL-MCNC: 8.5 MG/DL — SIGNIFICANT CHANGE UP (ref 8.4–10.5)
CALCIUM SERPL-MCNC: 8.6 MG/DL — SIGNIFICANT CHANGE UP (ref 8.4–10.5)
CALCIUM SERPL-MCNC: 8.7 MG/DL — SIGNIFICANT CHANGE UP (ref 8.4–10.5)
CHLORIDE SERPL-SCNC: 100 MMOL/L — SIGNIFICANT CHANGE UP (ref 98–107)
CHLORIDE SERPL-SCNC: 103 MMOL/L — SIGNIFICANT CHANGE UP (ref 98–107)
CHLORIDE SERPL-SCNC: 106 MMOL/L — SIGNIFICANT CHANGE UP (ref 98–107)
CHLORIDE SERPL-SCNC: 99 MMOL/L — SIGNIFICANT CHANGE UP (ref 98–107)
CK MB CFR SERPL CALC: 29 NG/ML — HIGH (ref 0–6.7)
CK SERPL-CCNC: 5379 U/L — HIGH (ref 30–200)
CK SERPL-CCNC: 5419 U/L — HIGH (ref 30–200)
CO2 SERPL-SCNC: 19 MMOL/L — LOW (ref 22–29)
CO2 SERPL-SCNC: 19 MMOL/L — LOW (ref 22–29)
CO2 SERPL-SCNC: 20 MMOL/L — LOW (ref 22–29)
CO2 SERPL-SCNC: 21 MMOL/L — LOW (ref 22–29)
CREAT SERPL-MCNC: 0.63 MG/DL — SIGNIFICANT CHANGE UP (ref 0.5–1.3)
CREAT SERPL-MCNC: 0.88 MG/DL — SIGNIFICANT CHANGE UP (ref 0.5–1.3)
CREAT SERPL-MCNC: 0.97 MG/DL — SIGNIFICANT CHANGE UP (ref 0.5–1.3)
CREAT SERPL-MCNC: 0.98 MG/DL — SIGNIFICANT CHANGE UP (ref 0.5–1.3)
CULTURE RESULTS: NO GROWTH — SIGNIFICANT CHANGE UP
EGFR: 102 ML/MIN/1.73M2 — SIGNIFICANT CHANGE UP
EGFR: 103 ML/MIN/1.73M2 — SIGNIFICANT CHANGE UP
EGFR: 114 ML/MIN/1.73M2 — SIGNIFICANT CHANGE UP
EGFR: 126 ML/MIN/1.73M2 — SIGNIFICANT CHANGE UP
EOSINOPHIL # BLD AUTO: 0 K/UL — SIGNIFICANT CHANGE UP (ref 0–0.5)
EOSINOPHIL NFR BLD AUTO: 0 % — SIGNIFICANT CHANGE UP (ref 0–6)
GAS PNL BLDV: SIGNIFICANT CHANGE UP
GAS PNL BLDV: SIGNIFICANT CHANGE UP
GLUCOSE SERPL-MCNC: 129 MG/DL — HIGH (ref 70–99)
GLUCOSE SERPL-MCNC: 132 MG/DL — HIGH (ref 70–99)
GLUCOSE SERPL-MCNC: 167 MG/DL — HIGH (ref 70–99)
GLUCOSE SERPL-MCNC: 194 MG/DL — HIGH (ref 70–99)
HBV CORE AB SER-ACNC: SIGNIFICANT CHANGE UP
HBV SURFACE AB SER-ACNC: 16.9 MIU/ML — SIGNIFICANT CHANGE UP
HBV SURFACE AB SER-ACNC: REACTIVE
HBV SURFACE AG SER-ACNC: SIGNIFICANT CHANGE UP
HCO3 BLDV-SCNC: 23 MMOL/L — SIGNIFICANT CHANGE UP (ref 22–29)
HCT VFR BLD CALC: 40.4 % — SIGNIFICANT CHANGE UP (ref 39–50)
HCT VFR BLD CALC: 43.7 % — SIGNIFICANT CHANGE UP (ref 39–50)
HCT VFR BLD CALC: 45.9 % — SIGNIFICANT CHANGE UP (ref 39–50)
HCV AB S/CO SERPL IA: 0.09 S/CO — SIGNIFICANT CHANGE UP (ref 0–0.99)
HCV AB SERPL-IMP: SIGNIFICANT CHANGE UP
HGB BLD-MCNC: 13.9 G/DL — SIGNIFICANT CHANGE UP (ref 13–17)
HGB BLD-MCNC: 15.5 G/DL — SIGNIFICANT CHANGE UP (ref 13–17)
HGB BLD-MCNC: 16.4 G/DL — SIGNIFICANT CHANGE UP (ref 13–17)
IMM GRANULOCYTES NFR BLD AUTO: 0.3 % — SIGNIFICANT CHANGE UP (ref 0–0.9)
INR BLD: 1.4 RATIO — HIGH (ref 0.88–1.16)
INR BLD: 1.66 RATIO — HIGH (ref 0.88–1.16)
LACTATE BLDV-MCNC: 2.3 MMOL/L — HIGH (ref 0.5–2)
LACTATE SERPL-SCNC: 3.1 MMOL/L — HIGH (ref 0.5–2)
LITHIUM SERPL-MCNC: <0.1 MMOL/L — LOW (ref 0.5–1.5)
LYMPHOCYTES # BLD AUTO: 1.17 K/UL — SIGNIFICANT CHANGE UP (ref 1–3.3)
LYMPHOCYTES # BLD AUTO: 9.5 % — LOW (ref 13–44)
MAGNESIUM SERPL-MCNC: 1.9 MG/DL — SIGNIFICANT CHANGE UP (ref 1.6–2.6)
MAGNESIUM SERPL-MCNC: 2.3 MG/DL — SIGNIFICANT CHANGE UP (ref 1.6–2.6)
MCHC RBC-ENTMCNC: 28.5 PG — SIGNIFICANT CHANGE UP (ref 27–34)
MCHC RBC-ENTMCNC: 28.9 PG — SIGNIFICANT CHANGE UP (ref 27–34)
MCHC RBC-ENTMCNC: 29.1 PG — SIGNIFICANT CHANGE UP (ref 27–34)
MCHC RBC-ENTMCNC: 34.4 GM/DL — SIGNIFICANT CHANGE UP (ref 32–36)
MCHC RBC-ENTMCNC: 35.5 GM/DL — SIGNIFICANT CHANGE UP (ref 32–36)
MCHC RBC-ENTMCNC: 35.7 GM/DL — SIGNIFICANT CHANGE UP (ref 32–36)
MCV RBC AUTO: 80.5 FL — SIGNIFICANT CHANGE UP (ref 80–100)
MCV RBC AUTO: 81.4 FL — SIGNIFICANT CHANGE UP (ref 80–100)
MCV RBC AUTO: 84 FL — SIGNIFICANT CHANGE UP (ref 80–100)
MONOCYTES # BLD AUTO: 0.17 K/UL — SIGNIFICANT CHANGE UP (ref 0–0.9)
MONOCYTES NFR BLD AUTO: 1.4 % — LOW (ref 2–14)
MRSA PCR RESULT.: SIGNIFICANT CHANGE UP
NEUTROPHILS # BLD AUTO: 10.94 K/UL — HIGH (ref 1.8–7.4)
NEUTROPHILS NFR BLD AUTO: 88.6 % — HIGH (ref 43–77)
OSMOLALITY SERPL: 312 MOSMOL/KG — HIGH (ref 275–300)
OSMOLALITY SERPL: 314 MOSMOL/KG — HIGH (ref 275–300)
OSMOLALITY SERPL: 320 MOSMOL/KG — HIGH (ref 275–300)
PCO2 BLDV: 31 MMHG — LOW (ref 42–55)
PH BLDV: 7.48 — HIGH (ref 7.32–7.43)
PHOSPHATE SERPL-MCNC: 2.4 MG/DL — SIGNIFICANT CHANGE UP (ref 2.4–4.7)
PHOSPHATE SERPL-MCNC: 2.9 MG/DL — SIGNIFICANT CHANGE UP (ref 2.4–4.7)
PHOSPHATE SERPL-MCNC: 3.2 MG/DL — SIGNIFICANT CHANGE UP (ref 2.4–4.7)
PLATELET # BLD AUTO: 155 K/UL — SIGNIFICANT CHANGE UP (ref 150–400)
PLATELET # BLD AUTO: 265 K/UL — SIGNIFICANT CHANGE UP (ref 150–400)
PLATELET # BLD AUTO: 268 K/UL — SIGNIFICANT CHANGE UP (ref 150–400)
PO2 BLDV: 155 MMHG — HIGH (ref 25–45)
POTASSIUM SERPL-MCNC: 2.5 MMOL/L — CRITICAL LOW (ref 3.5–5.3)
POTASSIUM SERPL-MCNC: 3.1 MMOL/L — LOW (ref 3.5–5.3)
POTASSIUM SERPL-MCNC: 3.2 MMOL/L — LOW (ref 3.5–5.3)
POTASSIUM SERPL-MCNC: 3.3 MMOL/L — LOW (ref 3.5–5.3)
POTASSIUM SERPL-SCNC: 2.5 MMOL/L — CRITICAL LOW (ref 3.5–5.3)
POTASSIUM SERPL-SCNC: 3.1 MMOL/L — LOW (ref 3.5–5.3)
POTASSIUM SERPL-SCNC: 3.2 MMOL/L — LOW (ref 3.5–5.3)
POTASSIUM SERPL-SCNC: 3.3 MMOL/L — LOW (ref 3.5–5.3)
PROT SERPL-MCNC: 5.8 G/DL — LOW (ref 6.6–8.7)
PROT SERPL-MCNC: 6.2 G/DL — LOW (ref 6.6–8.7)
PROT SERPL-MCNC: 6.2 G/DL — LOW (ref 6.6–8.7)
PROT SERPL-MCNC: 6.4 G/DL — LOW (ref 6.6–8.7)
PROTHROM AB SERPL-ACNC: 16.3 SEC — HIGH (ref 10.5–13.4)
PROTHROM AB SERPL-ACNC: 19.4 SEC — HIGH (ref 10.5–13.4)
RBC # BLD: 4.81 M/UL — SIGNIFICANT CHANGE UP (ref 4.2–5.8)
RBC # BLD: 5.43 M/UL — SIGNIFICANT CHANGE UP (ref 4.2–5.8)
RBC # BLD: 5.64 M/UL — SIGNIFICANT CHANGE UP (ref 4.2–5.8)
RBC # FLD: 12.8 % — SIGNIFICANT CHANGE UP (ref 10.3–14.5)
RBC # FLD: 12.8 % — SIGNIFICANT CHANGE UP (ref 10.3–14.5)
RBC # FLD: 13.1 % — SIGNIFICANT CHANGE UP (ref 10.3–14.5)
S AUREUS DNA NOSE QL NAA+PROBE: SIGNIFICANT CHANGE UP
SAO2 % BLDV: 100 % — SIGNIFICANT CHANGE UP
SODIUM SERPL-SCNC: 140 MMOL/L — SIGNIFICANT CHANGE UP (ref 135–145)
SODIUM SERPL-SCNC: 141 MMOL/L — SIGNIFICANT CHANGE UP (ref 135–145)
SODIUM SERPL-SCNC: 141 MMOL/L — SIGNIFICANT CHANGE UP (ref 135–145)
SODIUM SERPL-SCNC: 146 MMOL/L — HIGH (ref 135–145)
SPECIMEN SOURCE: SIGNIFICANT CHANGE UP
WBC # BLD: 12.35 K/UL — HIGH (ref 3.8–10.5)
WBC # BLD: 18.65 K/UL — HIGH (ref 3.8–10.5)
WBC # BLD: 20.58 K/UL — HIGH (ref 3.8–10.5)
WBC # FLD AUTO: 12.35 K/UL — HIGH (ref 3.8–10.5)
WBC # FLD AUTO: 18.65 K/UL — HIGH (ref 3.8–10.5)
WBC # FLD AUTO: 20.58 K/UL — HIGH (ref 3.8–10.5)

## 2022-09-21 PROCEDURE — 99291 CRITICAL CARE FIRST HOUR: CPT

## 2022-09-21 PROCEDURE — 99233 SBSQ HOSP IP/OBS HIGH 50: CPT

## 2022-09-21 PROCEDURE — 71250 CT THORAX DX C-: CPT | Mod: 26

## 2022-09-21 RX ORDER — CEFTRIAXONE 500 MG/1
1000 INJECTION, POWDER, FOR SOLUTION INTRAMUSCULAR; INTRAVENOUS EVERY 24 HOURS
Refills: 0 | Status: DISCONTINUED | OUTPATIENT
Start: 2022-09-21 | End: 2022-09-21

## 2022-09-21 RX ORDER — POTASSIUM CHLORIDE 20 MEQ
10 PACKET (EA) ORAL
Refills: 0 | Status: COMPLETED | OUTPATIENT
Start: 2022-09-21 | End: 2022-09-21

## 2022-09-21 RX ORDER — CEFTRIAXONE 500 MG/1
1000 INJECTION, POWDER, FOR SOLUTION INTRAMUSCULAR; INTRAVENOUS EVERY 24 HOURS
Refills: 0 | Status: COMPLETED | OUTPATIENT
Start: 2022-09-21 | End: 2022-09-25

## 2022-09-21 RX ORDER — POTASSIUM CHLORIDE 20 MEQ
40 PACKET (EA) ORAL ONCE
Refills: 0 | Status: COMPLETED | OUTPATIENT
Start: 2022-09-21 | End: 2022-09-21

## 2022-09-21 RX ORDER — OLANZAPINE 15 MG/1
10 TABLET, FILM COATED ORAL EVERY 8 HOURS
Refills: 0 | Status: DISCONTINUED | OUTPATIENT
Start: 2022-09-21 | End: 2022-09-22

## 2022-09-21 RX ORDER — FOLIC ACID 0.8 MG
1 TABLET ORAL DAILY
Refills: 0 | Status: DISCONTINUED | OUTPATIENT
Start: 2022-09-21 | End: 2022-09-28

## 2022-09-21 RX ORDER — SODIUM CHLORIDE 9 MG/ML
1000 INJECTION, SOLUTION INTRAVENOUS ONCE
Refills: 0 | Status: COMPLETED | OUTPATIENT
Start: 2022-09-21 | End: 2022-09-21

## 2022-09-21 RX ORDER — POTASSIUM CHLORIDE 20 MEQ
40 PACKET (EA) ORAL EVERY 4 HOURS
Refills: 0 | Status: COMPLETED | OUTPATIENT
Start: 2022-09-21 | End: 2022-09-21

## 2022-09-21 RX ORDER — MAGNESIUM SULFATE 500 MG/ML
1 VIAL (ML) INJECTION ONCE
Refills: 0 | Status: COMPLETED | OUTPATIENT
Start: 2022-09-21 | End: 2022-09-21

## 2022-09-21 RX ORDER — SODIUM CHLORIDE 9 MG/ML
1000 INJECTION, SOLUTION INTRAVENOUS
Refills: 0 | Status: DISCONTINUED | OUTPATIENT
Start: 2022-09-21 | End: 2022-09-22

## 2022-09-21 RX ORDER — SODIUM CHLORIDE 9 MG/ML
1000 INJECTION, SOLUTION INTRAVENOUS
Refills: 0 | Status: DISCONTINUED | OUTPATIENT
Start: 2022-09-21 | End: 2022-09-21

## 2022-09-21 RX ORDER — POTASSIUM CHLORIDE 20 MEQ
40 PACKET (EA) ORAL EVERY 4 HOURS
Refills: 0 | Status: COMPLETED | OUTPATIENT
Start: 2022-09-21 | End: 2022-09-22

## 2022-09-21 RX ORDER — THIAMINE MONONITRATE (VIT B1) 100 MG
100 TABLET ORAL DAILY
Refills: 0 | Status: DISCONTINUED | OUTPATIENT
Start: 2022-09-21 | End: 2022-09-28

## 2022-09-21 RX ADMIN — SODIUM CHLORIDE 75 MILLILITER(S): 9 INJECTION, SOLUTION INTRAVENOUS at 16:14

## 2022-09-21 RX ADMIN — PROPOFOL 21.6 MICROGRAM(S)/KG/MIN: 10 INJECTION, EMULSION INTRAVENOUS at 05:19

## 2022-09-21 RX ADMIN — FOMEPIZOLE 202.4 MILLIGRAM(S): 1 INJECTION, SOLUTION INTRAVENOUS at 05:18

## 2022-09-21 RX ADMIN — Medication 100 GRAM(S): at 08:30

## 2022-09-21 RX ADMIN — Medication 100 MILLIEQUIVALENT(S): at 02:24

## 2022-09-21 RX ADMIN — CHLORHEXIDINE GLUCONATE 15 MILLILITER(S): 213 SOLUTION TOPICAL at 05:18

## 2022-09-21 RX ADMIN — Medication 100 MILLIEQUIVALENT(S): at 04:25

## 2022-09-21 RX ADMIN — PROPOFOL 21.6 MICROGRAM(S)/KG/MIN: 10 INJECTION, EMULSION INTRAVENOUS at 02:24

## 2022-09-21 RX ADMIN — Medication 40 MILLIEQUIVALENT(S): at 02:24

## 2022-09-21 RX ADMIN — ENOXAPARIN SODIUM 40 MILLIGRAM(S): 100 INJECTION SUBCUTANEOUS at 22:29

## 2022-09-21 RX ADMIN — CHLORHEXIDINE GLUCONATE 1 APPLICATION(S): 213 SOLUTION TOPICAL at 05:19

## 2022-09-21 RX ADMIN — Medication 40 MILLIEQUIVALENT(S): at 16:18

## 2022-09-21 RX ADMIN — PANTOPRAZOLE SODIUM 40 MILLIGRAM(S): 20 TABLET, DELAYED RELEASE ORAL at 05:22

## 2022-09-21 RX ADMIN — SODIUM CHLORIDE 1000 MILLILITER(S): 9 INJECTION, SOLUTION INTRAVENOUS at 06:51

## 2022-09-21 RX ADMIN — Medication 40 MILLIEQUIVALENT(S): at 08:30

## 2022-09-21 RX ADMIN — Medication 100 MILLIEQUIVALENT(S): at 07:38

## 2022-09-21 RX ADMIN — CEFTRIAXONE 1000 MILLIGRAM(S): 500 INJECTION, POWDER, FOR SOLUTION INTRAMUSCULAR; INTRAVENOUS at 18:32

## 2022-09-21 RX ADMIN — Medication 40 MILLIEQUIVALENT(S): at 22:28

## 2022-09-21 RX ADMIN — CHLORHEXIDINE GLUCONATE 1 APPLICATION(S): 213 SOLUTION TOPICAL at 05:18

## 2022-09-21 RX ADMIN — Medication 100 MILLIEQUIVALENT(S): at 08:31

## 2022-09-21 RX ADMIN — Medication 100 MILLIEQUIVALENT(S): at 03:34

## 2022-09-21 RX ADMIN — Medication 100 MILLIEQUIVALENT(S): at 06:33

## 2022-09-21 RX ADMIN — Medication 40 MILLIEQUIVALENT(S): at 05:22

## 2022-09-21 RX ADMIN — SODIUM CHLORIDE 100 MILLILITER(S): 9 INJECTION, SOLUTION INTRAVENOUS at 02:24

## 2022-09-21 NOTE — PROGRESS NOTE ADULT - ATTENDING COMMENTS
MD Tejada phone consultation:  patient encounter discussed at-length with the fellow, and I agree with the impression & plan.  Patient s/p massive APAP overdose, with declining levels, but AST/ALT in 1000s.  Currently on Randy dosing of NAC, and fomepizole for Cyp2E6 inhibition.   Recs:  may stop PO nac  can stop fomepizole when APAP undetectable.  Continue IV NAC at phase 3 dosing (100mg/kg/16hrs) until AST/ALT peak, and subsequently diminish by 50% peak value.

## 2022-09-21 NOTE — PROVIDER CONTACT NOTE (CRITICAL VALUE NOTIFICATION) - DATE AND TIME:
Medical Necessity Information: It is in your best interest to select a reason for this procedure from the list below. All of these items fulfill various CMS LCD requirements except the new and changing color options. Render Post-Care Instructions In Note?: no 21-Sep-2022 05:52 Total Number Of Lesions Treated: 2 Post-Care Instructions: I reviewed with the patient in detail post-care instructions. Patient is to wear sunprotection, and avoid picking at any of the treated lesions. Pt may apply Vaseline to crusted or scabbing areas.wash off in two hours Medical Necessity Clause: This procedure was medically necessary, Detail Level: Simple Anesthesia Volume In Cc: 0.5 Consent: The patient's consent was obtained including but not limited to risks of crusting, scabbing, blistering, scarring, darker or lighter pigmentary change, recurrence, incomplete removal and infection.
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
## 2022-09-21 NOTE — PROGRESS NOTE ADULT - ASSESSMENT
Pt is a 38 yo M, with PMHx of bipolar disorder, who was BIBEMS on 9/21/22 after family found him unconscious with concern for severe acetaminophen ingestion. There were pill bottles around him including acetaminophen, olanzapine, gabapentin, valproat, and oxycodone. Patient was intubated after arrival, and admitted to MICU for

## 2022-09-21 NOTE — CONSULT NOTE ADULT - SUBJECTIVE AND OBJECTIVE BOX
Patient is a 37y old  Male who presents with a chief complaint of Acetaminophen toxicity (21 Sep 2022 15:41) s/p MICU hospitalization intubated and now extubated and being downgraded to the medical hospitalist team for continued mx of Tylenol overdose/ severe depression       HPI:  38 y/o M with a h/o bipolar disorder, presents to the ED after being found unresponsive covered in black emesis in his basement apartment today by family. Last seen in usual state of health around 2230 last night, but was drinking alcohol heavily. Per EMS report, family stated pt has a history of polysubstance abuse (mixing pills). No witnessed ingestion but multiple pill bottles found including Tylenol, valproate, olanzapine, oxycodone, gabapentin, and other OTCs were found near his person. Acetaminophen level > 600. Profound AG-metabolic acidosis. Intubated for airway protection. Developed shock state requiring IV vasopressor therapy.  (20 Sep 2022 16:45)    Overnight/ AM events:  Patient seen and examined at bedside. No acute events overnight. Patient states       PAST MEDICAL & SURGICAL HISTORY:  Anxiety  Post traumatic stress disorder (PTSD)  Bipolar disorder  Polysubstance abuse    PSHX:  No significant past surgical history        Social History:  Tabacco - active use but will not say how much his intake is   ETOH - active use but will not say how much his intake is   Illicit drug abuse - denies    FAMILY HISTORY:  No pertinent family history in first degree relatives- denies hx of depression or psychiatric illness       Allergies  Abilify (Other)  Intolerances  Pt prefers Lactaid milk. (Unknown)      HOME MEDICATIONS     REVIEW OF SYSTEMS:    CONSTITUTIONAL: Fatigue malaise +ve   EYES: No eye pain, visual disturbances, or discharge  NECK: No pain or stiffness  RESPIRATORY: No cough, wheezing, chills or hemoptysis; No shortness of breath  CARDIOVASCULAR: No chest pain, palpitations, dizziness, or leg swelling  GASTROINTESTINAL: No abdominal or epigastric pain. No nausea, vomiting, or hematemesis; No diarrhea or constipation. No melena or hematochezia.  GENITOURINARY: No dysuria, frequency, hematuria, or incontinence  NEUROLOGICAL: No headaches, memory loss, loss of strength, numbness, or tremors  SKIN: No itching, burning, rashes, or lesions   LYMPH NODES: No enlarged glands  ENDOCRINE: No heat or cold intolerance; No hair loss  MUSCULOSKELETAL: no joint pain   PSYCHIATRIC: +vedepression, no anxiety, mood swings, or difficulty sleeping       MEDICATIONS  (STANDING):  acetylcysteine 30 Gram(s),Dextrose 5% in Water 1000 milliLiter(s) 30 Gram(s) (100 mL/Hr) IV Continuous <Continuous>  chlorhexidine 2% Cloths 1 Application(s) Topical <User Schedule>  enoxaparin Injectable 40 milliGRAM(s) SubCutaneous every 24 hours  lactated ringers. 1000 milliLiter(s) (75 mL/Hr) IV Continuous <Continuous>  potassium chloride    Tablet ER 40 milliEquivalent(s) Oral once    MEDICATIONS  (PRN):  OLANZapine Injectable 10 milliGRAM(s) IntraMuscular every 8 hours PRN agitation  sodium chloride 0.9% lock flush 10 milliLiter(s) IV Push every 1 hour PRN Pre/post blood products, medications, blood draw, and to maintain line patency      Vital Signs Last 24 Hrs  T(C): 36.8 (21 Sep 2022 15:29), Max: 37.9 (21 Sep 2022 03:30)  T(F): 98.2 (21 Sep 2022 15:29), Max: 100.2 (21 Sep 2022 03:30)  HR: 98 (21 Sep 2022 15:29) (63 - 106)  BP: 130/85 (21 Sep 2022 15:29) (94/53 - 155/72)  BP(mean): 96 (21 Sep 2022 14:00) (65 - 116)  RR: 18 (21 Sep 2022 15:29) (12 - 415)  SpO2: 95% (21 Sep 2022 15:29) (95% - 100%)    Parameters below as of 21 Sep 2022 15:29  Patient On (Oxygen Delivery Method): room air    PHYSICAL EXAM:    GENERAL: Lying in bed, staring into space   NECK: Supple, No JVD, R neck dressing in place   NERVOUS SYSTEM:  Alert & Oriented X3, poor concentration; Motor Strength 5/5 B/L upper and lower extremities   CHEST/LUNG: CTA  b/l,  no rales, rhonchi, wheezing, or rubs, no peripheral edema  HEART: Regular rate and rhythm; No murmurs, rubs, or gallops  ABDOMEN: Soft, Nontender, Nondistended; Bowel sounds present  EXTREMITIES:   No clubbing or cyanosis   Psych: Flat affect, talking to himself,     LABS:                        16.4   20.58 )-----------( 265      ( 21 Sep 2022 05:15 )             45.9         141  |  103  |  11.8  ----------------------------<  167<H>  3.3<L>   |  19.0<L>  |  0.98    Ca    8.6      21 Sep 2022 11:45  Phos  2.4       Mg     2.3         TPro  6.2<L>  /  Alb  3.7  /  TBili  1.1  /  DBili  0.3  /  AST  1130<H>  /  ALT  1152<H>  /  AlkPhos  67      PT/INR - ( 21 Sep 2022 05:15 )   PT: 19.4 sec;   INR: 1.66 ratio         PTT - ( 20 Sep 2022 16:20 )  PTT:23.5 sec  Urinalysis Basic - ( 20 Sep 2022 12:56 )    Color: Yellow / Appearance: Clear / S.025 / pH: x  Gluc: x / Ketone: Trace  / Bili: Negative / Urobili: Negative mg/dL   Blood: x / Protein: 100 mg/dL / Nitrite: Negative   Leuk Esterase: Negative / RBC: 3-5 /HPF / WBC 0-2 /HPF   Sq Epi: x / Non Sq Epi: Few / Bacteria: Few          RADIOLOGY & ADDITIONAL STUDIES:     Patient is a 37y old  Male who presents with a chief complaint of Acetaminophen toxicity (21 Sep 2022 15:41) s/p MICU hospitalization intubated and now extubated and being downgraded to the medical hospitalist team for continued mx of Tylenol overdose/ severe depression       HPI:  36 y/o M with a h/o bipolar disorder, presents to the ED after being found unresponsive covered in black emesis in his basement apartment today by family. Last seen in usual state of health around 2230 last night, but was drinking alcohol heavily. Per EMS report, family stated pt has a history of polysubstance abuse (mixing pills). No witnessed ingestion but multiple pill bottles found including Tylenol, valproate, olanzapine, oxycodone, gabapentin, and other OTCs were found near his person. Acetaminophen level > 600. Profound AG-metabolic acidosis. Intubated for airway protection. Developed shock state requiring IV vasopressor therapy.  (20 Sep 2022 16:45)    Overnight/ AM events:  Patient seen and examined at bedside. No acute events overnight. Patient states       PAST MEDICAL & SURGICAL HISTORY:  Anxiety  Post traumatic stress disorder (PTSD)  Bipolar disorder  Polysubstance abuse    PSHX:  No significant past surgical history        Social History:  Tabacco - active use but will not say how much his intake is   ETOH - active use but will not say how much his intake is   Illicit drug abuse - denies    FAMILY HISTORY:  No pertinent family history in first degree relatives- denies hx of depression or psychiatric illness       Allergies  Abilify (Other)  Intolerances  Pt prefers Lactaid milk. (Unknown)      HOME MEDICATIONS     REVIEW OF SYSTEMS:    CONSTITUTIONAL: Fatigue malaise +ve   EYES: No eye pain, visual disturbances, or discharge  NECK: No pain or stiffness  RESPIRATORY: No cough, wheezing, chills or hemoptysis; No shortness of breath  CARDIOVASCULAR: No chest pain, palpitations, dizziness, or leg swelling  GASTROINTESTINAL: No abdominal or epigastric pain. No nausea, vomiting, or hematemesis; No diarrhea or constipation. No melena or hematochezia.  GENITOURINARY: No dysuria, frequency, hematuria, or incontinence  NEUROLOGICAL: No headaches, memory loss, loss of strength, numbness, or tremors  SKIN: No itching, burning, rashes, or lesions   LYMPH NODES: No enlarged glands  ENDOCRINE: No heat or cold intolerance; No hair loss  MUSCULOSKELETAL: no joint pain   PSYCHIATRIC: +ve depression, +ve anxiety, mood swings, and difficulty sleeping       MEDICATIONS  (STANDING):  acetylcysteine 30 Gram(s),Dextrose 5% in Water 1000 milliLiter(s) 30 Gram(s) (100 mL/Hr) IV Continuous <Continuous>  chlorhexidine 2% Cloths 1 Application(s) Topical <User Schedule>  enoxaparin Injectable 40 milliGRAM(s) SubCutaneous every 24 hours  lactated ringers. 1000 milliLiter(s) (75 mL/Hr) IV Continuous <Continuous>  potassium chloride    Tablet ER 40 milliEquivalent(s) Oral once    MEDICATIONS  (PRN):  OLANZapine Injectable 10 milliGRAM(s) IntraMuscular every 8 hours PRN agitation  sodium chloride 0.9% lock flush 10 milliLiter(s) IV Push every 1 hour PRN Pre/post blood products, medications, blood draw, and to maintain line patency      Vital Signs Last 24 Hrs  T(C): 36.8 (21 Sep 2022 15:29), Max: 37.9 (21 Sep 2022 03:30)  T(F): 98.2 (21 Sep 2022 15:29), Max: 100.2 (21 Sep 2022 03:30)  HR: 98 (21 Sep 2022 15:29) (63 - 106)  BP: 130/85 (21 Sep 2022 15:29) (94/53 - 155/72)  BP(mean): 96 (21 Sep 2022 14:00) (65 - 116)  RR: 18 (21 Sep 2022 15:29) (12 - 415)  SpO2: 95% (21 Sep 2022 15:29) (95% - 100%)    Parameters below as of 21 Sep 2022 15:29  Patient On (Oxygen Delivery Method): room air    PHYSICAL EXAM:    GENERAL: Lying in bed, staring into space   NECK: Supple, No JVD, R neck dressing in place   NERVOUS SYSTEM:  Alert & Oriented X3, poor concentration; Motor Strength 5/5 B/L upper and lower extremities   CHEST/LUNG: CTA  b/l,  no rales, rhonchi, wheezing, or rubs, no peripheral edema  HEART: Regular rate and rhythm; No murmurs, rubs, or gallops  ABDOMEN: Soft, Nontender, Nondistended; Bowel sounds present  EXTREMITIES:   No clubbing or cyanosis   Psych: Flat affect, talking to himself, anxious with paranoia     LABS:                        16.4   20.58 )-----------( 265      ( 21 Sep 2022 05:15 )             45.9         141  |  103  |  11.8  ----------------------------<  167<H>  3.3<L>   |  19.0<L>  |  0.98    Ca    8.6      21 Sep 2022 11:45  Phos  2.4       Mg     2.3         TPro  6.2<L>  /  Alb  3.7  /  TBili  1.1  /  DBili  0.3  /  AST  1130<H>  /  ALT  1152<H>  /  AlkPhos  67      PT/INR - ( 21 Sep 2022 05:15 )   PT: 19.4 sec;   INR: 1.66 ratio         PTT - ( 20 Sep 2022 16:20 )  PTT:23.5 sec  Urinalysis Basic - ( 20 Sep 2022 12:56 )    Color: Yellow / Appearance: Clear / S.025 / pH: x  Gluc: x / Ketone: Trace  / Bili: Negative / Urobili: Negative mg/dL   Blood: x / Protein: 100 mg/dL / Nitrite: Negative   Leuk Esterase: Negative / RBC: 3-5 /HPF / WBC 0-2 /HPF   Sq Epi: x / Non Sq Epi: Few / Bacteria: Few      RADIOLOGY & ADDITIONAL STUDIES:  All imaging studies reviewed   ct chest ordered    Patient is a 37y old  Male who presents with a chief complaint of Acetaminophen toxicity (21 Sep 2022 15:41) s/p MICU hospitalization intubated and now extubated and being downgraded to the medical hospitalist team for continued mx of Tylenol overdose/ severe depression       HPI:  38 y/o M with a h/o bipolar disorder, presents to the ED after being found unresponsive covered in black emesis in his basement apartment today by family. Last seen in usual state of health around 2230 last night, but was drinking alcohol heavily. Per EMS report, family stated pt has a history of polysubstance abuse (mixing pills). No witnessed ingestion but multiple pill bottles found including Tylenol, valproate, olanzapine, oxycodone, gabapentin, and other OTCs were found near his person. Acetaminophen level > 600. Profound AG-metabolic acidosis. Intubated for airway protection. Developed shock state requiring IV vasopressor therapy.  (20 Sep 2022 16:45)    Overnight/ AM events:  Patient seen and examined at bedside. No acute events overnight. Patient states       PAST MEDICAL & SURGICAL HISTORY:  Anxiety  Post traumatic stress disorder (PTSD)  Bipolar disorder  Polysubstance abuse    PSHX:  No significant past surgical history        Social History:  Tabacco - active use but will not say how much his intake is   ETOH - active use but will not say how much his intake is   Illicit drug abuse - denies    FAMILY HISTORY:  No pertinent family history in first degree relatives- denies hx of depression or psychiatric illness       Allergies  Abilify (Other)  Intolerances  Pt prefers Lactaid milk. (Unknown)      HOME MEDICATIONS     REVIEW OF SYSTEMS:    CONSTITUTIONAL: Fatigue malaise +ve   EYES: No eye pain, visual disturbances, or discharge  NECK: No pain or stiffness  RESPIRATORY: No cough, wheezing, chills or hemoptysis; No shortness of breath  CARDIOVASCULAR: No chest pain, palpitations, dizziness, or leg swelling  GASTROINTESTINAL: No abdominal or epigastric pain. No nausea, vomiting, or hematemesis; No diarrhea or constipation. No melena or hematochezia.  GENITOURINARY: No dysuria, frequency, hematuria, or incontinence  NEUROLOGICAL: No headaches, memory loss, loss of strength, numbness, or tremors  SKIN: No itching, burning, rashes, or lesions   LYMPH NODES: No enlarged glands  ENDOCRINE: No heat or cold intolerance; No hair loss  MUSCULOSKELETAL: no joint pain   PSYCHIATRIC: +ve depression, +ve anxiety, mood swings, and difficulty sleeping       MEDICATIONS  (STANDING):  acetylcysteine 30 Gram(s),Dextrose 5% in Water 1000 milliLiter(s) 30 Gram(s) (100 mL/Hr) IV Continuous <Continuous>  chlorhexidine 2% Cloths 1 Application(s) Topical <User Schedule>  enoxaparin Injectable 40 milliGRAM(s) SubCutaneous every 24 hours  lactated ringers. 1000 milliLiter(s) (75 mL/Hr) IV Continuous <Continuous>  potassium chloride    Tablet ER 40 milliEquivalent(s) Oral once    MEDICATIONS  (PRN):  OLANZapine Injectable 10 milliGRAM(s) IntraMuscular every 8 hours PRN agitation  sodium chloride 0.9% lock flush 10 milliLiter(s) IV Push every 1 hour PRN Pre/post blood products, medications, blood draw, and to maintain line patency      Vital Signs Last 24 Hrs  T(C): 36.8 (21 Sep 2022 15:29), Max: 37.9 (21 Sep 2022 03:30)  T(F): 98.2 (21 Sep 2022 15:29), Max: 100.2 (21 Sep 2022 03:30)  HR: 98 (21 Sep 2022 15:29) (63 - 106)  BP: 130/85 (21 Sep 2022 15:29) (94/53 - 155/72)  BP(mean): 96 (21 Sep 2022 14:00) (65 - 116)  RR: 18 (21 Sep 2022 15:29) (12 - 415)  SpO2: 95% (21 Sep 2022 15:29) (95% - 100%)    Parameters below as of 21 Sep 2022 15:29  Patient On (Oxygen Delivery Method): room air    PHYSICAL EXAM:    GENERAL: Lying in bed, staring into space   NECK: Supple, No JVD, R neck dressing in place   NERVOUS SYSTEM:  Alert & Oriented X2 person and place, poor concentration   CHEST/LUNG: CTA  b/l,  no rales, rhonchi, wheezing, or rubs, no peripheral edema  HEART: Regular rate and rhythm; No murmurs, rubs, or gallops  ABDOMEN: Soft, Nontender, Nondistended; Bowel sounds present  EXTREMITIES:   No clubbing or cyanosis   Psych: Flat affect, talking to himself, anxious with paranoia     LABS:                        16.4   20.58 )-----------( 265      ( 21 Sep 2022 05:15 )             45.9         141  |  103  |  11.8  ----------------------------<  167<H>  3.3<L>   |  19.0<L>  |  0.98    Ca    8.6      21 Sep 2022 11:45  Phos  2.4       Mg     2.3         TPro  6.2<L>  /  Alb  3.7  /  TBili  1.1  /  DBili  0.3  /  AST  1130<H>  /  ALT  1152<H>  /  AlkPhos  67      PT/INR - ( 21 Sep 2022 05:15 )   PT: 19.4 sec;   INR: 1.66 ratio         PTT - ( 20 Sep 2022 16:20 )  PTT:23.5 sec  Urinalysis Basic - ( 20 Sep 2022 12:56 )    Color: Yellow / Appearance: Clear / S.025 / pH: x  Gluc: x / Ketone: Trace  / Bili: Negative / Urobili: Negative mg/dL   Blood: x / Protein: 100 mg/dL / Nitrite: Negative   Leuk Esterase: Negative / RBC: 3-5 /HPF / WBC 0-2 /HPF   Sq Epi: x / Non Sq Epi: Few / Bacteria: Few      RADIOLOGY & ADDITIONAL STUDIES:  All imaging studies reviewed   ct chest ordered

## 2022-09-21 NOTE — PROGRESS NOTE ADULT - ASSESSMENT
The patient is a 37 year old male with psych history since age of 13 which includes bipolar disorder with psychotic features, post traumatic stress disorder, prior history of suicidal ideation, with history of multiple inpatient psych admissions, whom (per documentation) was found unresponsive in his apartment, covered in black emesis. He was also found surrounded by bottles containing acetaminophen, valproate, olanzapine, oxycodone, gabapentin, and other OTCs. Per documentation, family stated that the patient has a history of overdosing on pills. He was emergently intubated in the ED due to obtundation/airway protection. Admitted for acetaminophen toxicity (acetaminophen < 600). Hospital course notable for elevated osmolar gap concerning for coingestion, s/p emergent hemodialysis on 9/20.     -On admission, osmolar gap of ~30  -s/p emergent hemodialysis on 9/20   -No further high osmolar gap on repeat labs this a.m.   -Extubated this a.m.; mental status appears to be his baseline (delusion/paranoia) - articulated that his parents are "out to sabotage him" which is on par with the behaviour that he has been exhibiting lately since self-discontinuation of his psych meds as per patient's father   -Given normal osmolar gap plus mentation at baseline, no further indication for renal replacement therapy   -Okay to remove non tunneled dialysis catheter   -Renal function remains at baseline at this time; remains non oliguric   -Rhabdomyolysis (CPK 5300+) - continue LR - would increase rate from 50cc/hr to 75cc/hr; continue to trend CPK daily with a.m. labs   -HypoK - s/p repletion   -Acetaminophen toxicity - improving - latest acetaminophen level is 102 (from > 600 on admission) - continue medical management as per Toxicology recommendations     Maritza Brown DO  Nephrology    The patient is a 37 year old male with psych history since age of 13 which includes bipolar disorder with psychotic features, post traumatic stress disorder, prior history of suicidal ideation, with history of multiple inpatient psych admissions, whom (per documentation) was found unresponsive in his apartment, covered in black emesis. He was also found surrounded by bottles containing acetaminophen, valproate, olanzapine, oxycodone, gabapentin, and other OTCs. Per documentation, family stated that the patient has a history of overdosing on pills. He was emergently intubated in the ED due to obtundation/airway protection. Admitted for acetaminophen toxicity (acetaminophen < 600). Hospital course notable for elevated osmolar gap concerning for coingestion, s/p emergent hemodialysis on 9/20.     -On admission, osmolar gap of ~30  -s/p emergent hemodialysis on 9/20   -No further high osmolar gap on repeat labs this a.m.   -Extubated this a.m.; mental status appears to be his baseline (delusion/paranoia) - articulated that his parents are "out to sabotage him" which is on par with the behaviour that he has been exhibiting lately since self-discontinuation of his psych meds as per patient's father   -Given normal osmolar gap plus mentation at baseline, no further indication for renal replacement therapy   -Okay to remove non tunneled dialysis catheter   -Renal function remains at baseline at this time; remains non oliguric   -Continues to have high anion gap - suspecting from 5-oxoproline from acetaminophen toxicity + mildly from lactic acid   -Rhabdomyolysis (CPK 5300+) - continue LR - would increase rate from 50cc/hr to 75cc/hr; continue to trend CPK daily with a.m. labs   -HypoK - s/p repletion   -Acetaminophen toxicity - improving - latest acetaminophen level is 102 (from > 600 on admission) - continue medical management as per Toxicology recommendations     Maritza Brown DO  Nephrology

## 2022-09-21 NOTE — PROGRESS NOTE ADULT - ASSESSMENT
37M PMH bipolar disorder who presented on 9/20/22 with AMS, unresponsive mental status, dark emesis with recent heavy EtOH intake, found with pill bottles near him concerning for overdose, found with elevated APAP level requiring NAC, HAGMA acidosis with elevated serum osmolality requiring HD    Impression/Plan:    AMS, unresponsive mental status  Concern for polysubstance overdose  UTox positive for THC  HAGMA  - Given elevated serum osmolality he completed HD overnight without UF  - Repeat chemistry serially  - F/u nephrology; further HD per recs  - IV sedation while intubated, goal RASS -1    Respiratory failure  - Full vent support with daily SAT/SBT  - Assess mentation closely  - Monitor Paw    Elevated APAP level  - C/w NAC infusion  - Trend coags and CMP  - Hold hepatotoxic agents  - Low threshold for involvement of transplant team    EtOH abuse  - CIWA  - Is on propofol for sedation  - Monitor s/s of withdrawal    Shock state requiring pressors  - Weaned off Levophed overnight  - Likely related to IV sedation  - Monitor VS closely    F/E/N/PPx/Lines  - Maintain euvolemia  - Replete electrolytes PRN  - If unable to extubate in AM will start tube feeds  - VTE PPx Lovenox  - PIV; R IJ HD catheter (9/20- )    Ethics/Dispo  - Full code  - C/w care in ICU      Ruben Lomax M.D.  , Pulmonary & Critical Care Medicine  [facility]  Pulmonary and Sleep Medicine at [place]  [address]  [place] [address]  T: [phone]  F: [phone] 37M PMH bipolar disorder who presented on 9/20/22 with AMS, unresponsive mental status, dark emesis with recent heavy EtOH intake, found with pill bottles near him concerning for overdose, found with elevated APAP level requiring NAC, HAGMA acidosis with elevated serum osmolality requiring HD    Impression/Plan:    AMS, unresponsive mental status  Concern for polysubstance overdose  UTox positive for THC  HAGMA  - Given elevated serum osmolality he completed HD overnight without UF  - Repeat chemistry serially  - F/u nephrology; further HD per recs  - IV sedation while intubated, goal RASS -1    Respiratory failure  - Full vent support with daily SAT/SBT  - Assess mentation closely  - Monitor Paw    Elevated APAP level  - C/w NAC infusion  - Will D/C Fomepizole given lack of evidence of it's use in acetaminophen toxicity regardless of level at presentation  - Trend coags and CMP  - Hold hepatotoxic agents  - Low threshold for involvement of transplant team    EtOH abuse  - CIWA  - Is on propofol for sedation  - Monitor s/s of withdrawal    Shock state requiring pressors  - Weaned off Levophed overnight  - Likely related to IV sedation  - Monitor VS closely    F/E/N/PPx/Lines  - Maintain euvolemia  - Replete electrolytes PRN  - If unable to extubate in AM will start tube feeds  - VTE PPx Lovenox  - PIV; R IJ HD catheter (9/20- )    Ethics/Dispo  - Full code  - C/w care in ICU      Ruben Lomax M.D.  , Pulmonary & Critical Care Medicine  [facility]  Pulmonary and Sleep Medicine at [place]  [address]  [place] [address]  T: [phone]  F: [phone]

## 2022-09-22 DIAGNOSIS — Z90.49 ACQUIRED ABSENCE OF OTHER SPECIFIED PARTS OF DIGESTIVE TRACT: Chronic | ICD-10-CM

## 2022-09-22 LAB
A1C WITH ESTIMATED AVERAGE GLUCOSE RESULT: 5.6 % — SIGNIFICANT CHANGE UP (ref 4–5.6)
ALBUMIN SERPL ELPH-MCNC: 3.2 G/DL — LOW (ref 3.3–5.2)
ALBUMIN SERPL ELPH-MCNC: 3.4 G/DL — SIGNIFICANT CHANGE UP (ref 3.3–5.2)
ALBUMIN SERPL ELPH-MCNC: 3.5 G/DL — SIGNIFICANT CHANGE UP (ref 3.3–5.2)
ALP SERPL-CCNC: 60 U/L — SIGNIFICANT CHANGE UP (ref 40–120)
ALP SERPL-CCNC: 61 U/L — SIGNIFICANT CHANGE UP (ref 40–120)
ALP SERPL-CCNC: 68 U/L — SIGNIFICANT CHANGE UP (ref 40–120)
ALT FLD-CCNC: 2666 U/L — HIGH
ALT FLD-CCNC: 3062 U/L — HIGH
ALT FLD-CCNC: 3249 U/L — HIGH
AMMONIA BLD-MCNC: 23 UMOL/L — SIGNIFICANT CHANGE UP (ref 11–55)
AMYLASE P1 CFR SERPL: 63 U/L — SIGNIFICANT CHANGE UP (ref 36–128)
ANION GAP SERPL CALC-SCNC: 11 MMOL/L — SIGNIFICANT CHANGE UP (ref 5–17)
ANION GAP SERPL CALC-SCNC: 15 MMOL/L — SIGNIFICANT CHANGE UP (ref 5–17)
ANION GAP SERPL CALC-SCNC: 15 MMOL/L — SIGNIFICANT CHANGE UP (ref 5–17)
APAP SERPL-MCNC: 3.4 UG/ML — LOW (ref 10–26)
APPEARANCE UR: CLEAR — SIGNIFICANT CHANGE UP
APTT BLD: 39.3 SEC — HIGH (ref 27.5–35.5)
AST SERPL-CCNC: 1161 U/L — HIGH
AST SERPL-CCNC: 1653 U/L — HIGH
AST SERPL-CCNC: 1963 U/L — HIGH
B-OH-BUTYR SERPL-SCNC: 0.1 MMOL/L — SIGNIFICANT CHANGE UP
BACTERIA # UR AUTO: ABNORMAL
BILIRUB SERPL-MCNC: 0.8 MG/DL — SIGNIFICANT CHANGE UP (ref 0.4–2)
BILIRUB SERPL-MCNC: 1.2 MG/DL — SIGNIFICANT CHANGE UP (ref 0.4–2)
BILIRUB SERPL-MCNC: 1.2 MG/DL — SIGNIFICANT CHANGE UP (ref 0.4–2)
BILIRUB UR-MCNC: NEGATIVE — SIGNIFICANT CHANGE UP
BUN SERPL-MCNC: 6.6 MG/DL — LOW (ref 8–20)
BUN SERPL-MCNC: 8 MG/DL — SIGNIFICANT CHANGE UP (ref 8–20)
BUN SERPL-MCNC: 9.4 MG/DL — SIGNIFICANT CHANGE UP (ref 8–20)
CALCIUM SERPL-MCNC: 8 MG/DL — LOW (ref 8.4–10.5)
CALCIUM SERPL-MCNC: 8.3 MG/DL — LOW (ref 8.4–10.5)
CALCIUM SERPL-MCNC: 8.6 MG/DL — SIGNIFICANT CHANGE UP (ref 8.4–10.5)
CERULOPLASMIN SERPL-MCNC: 17 MG/DL — SIGNIFICANT CHANGE UP (ref 15–30)
CHLORIDE SERPL-SCNC: 102 MMOL/L — SIGNIFICANT CHANGE UP (ref 98–107)
CHLORIDE SERPL-SCNC: 103 MMOL/L — SIGNIFICANT CHANGE UP (ref 98–107)
CHLORIDE SERPL-SCNC: 106 MMOL/L — SIGNIFICANT CHANGE UP (ref 98–107)
CK MB CFR SERPL CALC: 18.9 NG/ML — HIGH (ref 0–6.7)
CK SERPL-CCNC: 7685 U/L — HIGH (ref 30–200)
CK SERPL-CCNC: 9414 U/L — HIGH (ref 30–200)
CO2 SERPL-SCNC: 21 MMOL/L — LOW (ref 22–29)
CO2 SERPL-SCNC: 22 MMOL/L — SIGNIFICANT CHANGE UP (ref 22–29)
CO2 SERPL-SCNC: 23 MMOL/L — SIGNIFICANT CHANGE UP (ref 22–29)
COLOR SPEC: YELLOW — SIGNIFICANT CHANGE UP
CREAT ?TM UR-MCNC: 204 MG/DL — SIGNIFICANT CHANGE UP
CREAT SERPL-MCNC: 0.58 MG/DL — SIGNIFICANT CHANGE UP (ref 0.5–1.3)
CREAT SERPL-MCNC: 0.72 MG/DL — SIGNIFICANT CHANGE UP (ref 0.5–1.3)
CREAT SERPL-MCNC: 0.76 MG/DL — SIGNIFICANT CHANGE UP (ref 0.5–1.3)
DIFF PNL FLD: ABNORMAL
EGFR: 119 ML/MIN/1.73M2 — SIGNIFICANT CHANGE UP
EGFR: 121 ML/MIN/1.73M2 — SIGNIFICANT CHANGE UP
EGFR: 129 ML/MIN/1.73M2 — SIGNIFICANT CHANGE UP
EPI CELLS # UR: SIGNIFICANT CHANGE UP
ESTIMATED AVERAGE GLUCOSE: 114 MG/DL — SIGNIFICANT CHANGE UP (ref 68–114)
GLUCOSE SERPL-MCNC: 116 MG/DL — HIGH (ref 70–99)
GLUCOSE SERPL-MCNC: 132 MG/DL — HIGH (ref 70–99)
GLUCOSE SERPL-MCNC: 155 MG/DL — HIGH (ref 70–99)
GLUCOSE UR QL: 100 MG/DL
HCT VFR BLD CALC: 34.4 % — LOW (ref 39–50)
HGB BLD-MCNC: 12 G/DL — LOW (ref 13–17)
HIV 1 & 2 AB SERPL IA.RAPID: SIGNIFICANT CHANGE UP
HSV DNA1: SIGNIFICANT CHANGE UP
HSV DNA2: SIGNIFICANT CHANGE UP
HSV1 DNA BLD QL NAA+PROBE: SIGNIFICANT CHANGE UP
HSV2 DNA BLD QL NAA+PROBE: SIGNIFICANT CHANGE UP
IGA FLD-MCNC: 112 MG/DL — SIGNIFICANT CHANGE UP (ref 84–499)
IGG FLD-MCNC: 804 MG/DL — SIGNIFICANT CHANGE UP (ref 610–1660)
IGM SERPL-MCNC: 36 MG/DL — SIGNIFICANT CHANGE UP (ref 35–242)
INR BLD: 1.49 RATIO — HIGH (ref 0.88–1.16)
INR BLD: 1.58 RATIO — HIGH (ref 0.88–1.16)
INR BLD: 1.83 RATIO — HIGH (ref 0.88–1.16)
KAPPA LC SER QL IFE: 1.15 MG/DL — SIGNIFICANT CHANGE UP (ref 0.33–1.94)
KAPPA/LAMBDA FREE LIGHT CHAIN RATIO, SERUM: 1.03 RATIO — SIGNIFICANT CHANGE UP (ref 0.26–1.65)
KETONES UR-MCNC: ABNORMAL
LAMBDA LC SER QL IFE: 1.12 MG/DL — SIGNIFICANT CHANGE UP (ref 0.57–2.63)
LEUKOCYTE ESTERASE UR-ACNC: ABNORMAL
LIDOCAIN IGE QN: 72 U/L — HIGH (ref 22–51)
MAGNESIUM SERPL-MCNC: 1.7 MG/DL — SIGNIFICANT CHANGE UP (ref 1.6–2.6)
MAGNESIUM SERPL-MCNC: 1.8 MG/DL — SIGNIFICANT CHANGE UP (ref 1.6–2.6)
MCHC RBC-ENTMCNC: 28.5 PG — SIGNIFICANT CHANGE UP (ref 27–34)
MCHC RBC-ENTMCNC: 34.9 GM/DL — SIGNIFICANT CHANGE UP (ref 32–36)
MCV RBC AUTO: 81.7 FL — SIGNIFICANT CHANGE UP (ref 80–100)
NITRITE UR-MCNC: NEGATIVE — SIGNIFICANT CHANGE UP
OSMOLALITY SERPL: 297 MOSMOL/KG — SIGNIFICANT CHANGE UP (ref 275–300)
OSMOLALITY UR: 666 MOSM/KG — SIGNIFICANT CHANGE UP (ref 300–1000)
PH UR: 5 — SIGNIFICANT CHANGE UP (ref 5–8)
PHOSPHATE SERPL-MCNC: 1 MG/DL — CRITICAL LOW (ref 2.4–4.7)
PHOSPHATE SERPL-MCNC: 1.6 MG/DL — LOW (ref 2.4–4.7)
PLATELET # BLD AUTO: 121 K/UL — LOW (ref 150–400)
POTASSIUM SERPL-MCNC: 2.8 MMOL/L — CRITICAL LOW (ref 3.5–5.3)
POTASSIUM SERPL-MCNC: 3.2 MMOL/L — LOW (ref 3.5–5.3)
POTASSIUM SERPL-MCNC: 3.2 MMOL/L — LOW (ref 3.5–5.3)
POTASSIUM SERPL-SCNC: 2.8 MMOL/L — CRITICAL LOW (ref 3.5–5.3)
POTASSIUM SERPL-SCNC: 3.2 MMOL/L — LOW (ref 3.5–5.3)
POTASSIUM SERPL-SCNC: 3.2 MMOL/L — LOW (ref 3.5–5.3)
PROT ?TM UR-MCNC: 80 MG/DL — HIGH (ref 0–12)
PROT SERPL-MCNC: 5.4 G/DL — LOW (ref 6.6–8.7)
PROT SERPL-MCNC: 5.5 G/DL — LOW (ref 6.6–8.7)
PROT SERPL-MCNC: 5.8 G/DL — LOW (ref 6.6–8.7)
PROT UR-MCNC: 15
PROT/CREAT UR-RTO: 0.4 RATIO — HIGH
PROTHROM AB SERPL-ACNC: 17.4 SEC — HIGH (ref 10.5–13.4)
PROTHROM AB SERPL-ACNC: 18.4 SEC — HIGH (ref 10.5–13.4)
PROTHROM AB SERPL-ACNC: 21.4 SEC — HIGH (ref 10.5–13.4)
RBC # BLD: 4.21 M/UL — SIGNIFICANT CHANGE UP (ref 4.2–5.8)
RBC # FLD: 13 % — SIGNIFICANT CHANGE UP (ref 10.3–14.5)
RBC CASTS # UR COMP ASSIST: SIGNIFICANT CHANGE UP /HPF (ref 0–4)
SODIUM SERPL-SCNC: 137 MMOL/L — SIGNIFICANT CHANGE UP (ref 135–145)
SODIUM SERPL-SCNC: 138 MMOL/L — SIGNIFICANT CHANGE UP (ref 135–145)
SODIUM SERPL-SCNC: 142 MMOL/L — SIGNIFICANT CHANGE UP (ref 135–145)
SODIUM UR-SCNC: 31 MMOL/L — SIGNIFICANT CHANGE UP
SP GR SPEC: 1.02 — SIGNIFICANT CHANGE UP (ref 1.01–1.02)
UROBILINOGEN FLD QL: NEGATIVE MG/DL — SIGNIFICANT CHANGE UP
UUN UR-MCNC: 722 MG/DL — SIGNIFICANT CHANGE UP
WBC # BLD: 8.4 K/UL — SIGNIFICANT CHANGE UP (ref 3.8–10.5)
WBC # FLD AUTO: 8.4 K/UL — SIGNIFICANT CHANGE UP (ref 3.8–10.5)
WBC UR QL: SIGNIFICANT CHANGE UP /HPF (ref 0–5)

## 2022-09-22 PROCEDURE — 74177 CT ABD & PELVIS W/CONTRAST: CPT | Mod: 26

## 2022-09-22 PROCEDURE — 99223 1ST HOSP IP/OBS HIGH 75: CPT

## 2022-09-22 PROCEDURE — 99233 SBSQ HOSP IP/OBS HIGH 50: CPT

## 2022-09-22 PROCEDURE — 99252 IP/OBS CONSLTJ NEW/EST SF 35: CPT

## 2022-09-22 RX ORDER — SODIUM,POTASSIUM PHOSPHATES 278-250MG
1 POWDER IN PACKET (EA) ORAL
Refills: 0 | Status: COMPLETED | OUTPATIENT
Start: 2022-09-22 | End: 2022-09-25

## 2022-09-22 RX ORDER — SODIUM,POTASSIUM PHOSPHATES 278-250MG
1 POWDER IN PACKET (EA) ORAL
Refills: 0 | Status: DISCONTINUED | OUTPATIENT
Start: 2022-09-22 | End: 2022-09-22

## 2022-09-22 RX ORDER — POTASSIUM PHOSPHATE, MONOBASIC POTASSIUM PHOSPHATE, DIBASIC 236; 224 MG/ML; MG/ML
30 INJECTION, SOLUTION INTRAVENOUS ONCE
Refills: 0 | Status: COMPLETED | OUTPATIENT
Start: 2022-09-22 | End: 2022-09-22

## 2022-09-22 RX ORDER — POTASSIUM CHLORIDE 20 MEQ
10 PACKET (EA) ORAL
Refills: 0 | Status: COMPLETED | OUTPATIENT
Start: 2022-09-22 | End: 2022-09-22

## 2022-09-22 RX ORDER — SODIUM CHLORIDE 9 MG/ML
1000 INJECTION, SOLUTION INTRAVENOUS
Refills: 0 | Status: DISCONTINUED | OUTPATIENT
Start: 2022-09-22 | End: 2022-09-23

## 2022-09-22 RX ADMIN — Medication 100 MILLIEQUIVALENT(S): at 10:44

## 2022-09-22 RX ADMIN — CEFTRIAXONE 1000 MILLIGRAM(S): 500 INJECTION, POWDER, FOR SOLUTION INTRAMUSCULAR; INTRAVENOUS at 18:24

## 2022-09-22 RX ADMIN — Medication 100 MILLIEQUIVALENT(S): at 09:50

## 2022-09-22 RX ADMIN — Medication 1 TABLET(S): at 22:44

## 2022-09-22 RX ADMIN — Medication 100 MILLIEQUIVALENT(S): at 11:57

## 2022-09-22 RX ADMIN — SODIUM CHLORIDE 75 MILLILITER(S): 9 INJECTION, SOLUTION INTRAVENOUS at 01:52

## 2022-09-22 RX ADMIN — ENOXAPARIN SODIUM 40 MILLIGRAM(S): 100 INJECTION SUBCUTANEOUS at 22:44

## 2022-09-22 RX ADMIN — Medication 1 TABLET(S): at 12:00

## 2022-09-22 RX ADMIN — CHLORHEXIDINE GLUCONATE 1 APPLICATION(S): 213 SOLUTION TOPICAL at 05:52

## 2022-09-22 RX ADMIN — Medication 100 MILLIGRAM(S): at 12:02

## 2022-09-22 RX ADMIN — Medication 1 MILLIGRAM(S): at 12:00

## 2022-09-22 RX ADMIN — Medication 1 TABLET(S): at 18:30

## 2022-09-22 RX ADMIN — Medication 40 MILLIEQUIVALENT(S): at 03:33

## 2022-09-22 NOTE — CONSULT NOTE ADULT - SUBJECTIVE AND OBJECTIVE BOX
Chief Complaint:  Patient is a 37y old  Male who presents with a chief complaint of Acetaminophen toxicity (21 Sep 2022 15:41)      HPI:            Allergies:  Abilify (Other)  Pt prefers Lactaid milk. (Unknown)      Home Medications:    Hospital Medications:  acetylcysteine 30 Gram(s),Dextrose 5% in Water 1000 milliLiter(s) 30 Gram(s) IV Continuous <Continuous>  cefTRIAXone Injectable. 1000 milliGRAM(s) IV Push every 24 hours  chlorhexidine 2% Cloths 1 Application(s) Topical <User Schedule>  enoxaparin Injectable 40 milliGRAM(s) SubCutaneous every 24 hours  folic acid 1 milliGRAM(s) Oral daily  lactated ringers. 1000 milliLiter(s) IV Continuous <Continuous>  multivitamin 1 Tablet(s) Oral daily  OLANZapine Injectable 10 milliGRAM(s) IntraMuscular every 8 hours PRN  potassium chloride  10 mEq/100 mL IVPB 10 milliEquivalent(s) IV Intermittent every 1 hour  potassium phosphate IVPB 30 milliMole(s) IV Intermittent once  sodium chloride 0.9% lock flush 10 milliLiter(s) IV Push every 1 hour PRN  thiamine 100 milliGRAM(s) Oral daily      PMHX/PSHX:  No pertinent past medical history    Anxiety    Post traumatic stress disorder (PTSD)    No pertinent past medical history    Bipolar disorder    Polysubstance abuse    No significant past surgical history    No significant past surgical history        Family history:  No pertinent family history in first degree relatives        Social History:     ROS:     General:  No wt loss, fevers, chills, night sweats, fatigue,   Eyes:  Good vision, no reported pain  ENT:  No sore throat, pain, runny nose, dysphagia  CV:  No pain, palpitations, hypo/hypertension  Resp:  No dyspnea, cough, tachypnea, wheezing  GI:  See HPI  :  No pain, bleeding, incontinence, nocturia  Muscle:  No pain, weakness  Neuro:  No weakness, tingling, memory problems  Psych:  No fatigue, insomnia, mood problems, depression  Endocrine:  No polyuria, polydipsia, cold/heat intolerance  Heme:  No petechiae, ecchymosis, easy bruisability  Skin:  No rash, edema      PHYSICAL EXAM:     GENERAL:  Appears stated age, well-groomed, well-nourished, no distress  HEENT:  NC/AT,  conjunctivae clear and pink,  no JVD  CHEST:  Full & symmetric excursion, no increased effort, breath sounds clear  HEART:  Regular rhythm, S1, S2, no murmur/rub/S3/S4, no abdominal bruit, no edema  ABDOMEN:  Soft, non-tender, non-distended, normoactive bowel sounds,  no masses ,  EXTREMITIES:  no cyanosis,clubbing or edema  SKIN:  No rash/erythema/ecchymoses/petechiae/wounds/abscess/warm/dry  NEURO:  Alert, oriented    Vital Signs:  Vital Signs Last 24 Hrs  T(C): 36.6 (22 Sep 2022 04:16), Max: 37.3 (21 Sep 2022 10:51)  T(F): 97.9 (22 Sep 2022 04:16), Max: 99.2 (21 Sep 2022 10:51)  HR: 84 (22 Sep 2022 04:16) (84 - 106)  BP: 114/69 (22 Sep 2022 04:16) (114/69 - 155/72)  BP(mean): 96 (21 Sep 2022 14:00) (88 - 116)  RR: 19 (22 Sep 2022 04:16) (12 - 19)  SpO2: 95% (21 Sep 2022 15:29) (95% - 96%)    Parameters below as of 22 Sep 2022 04:16  Patient On (Oxygen Delivery Method): room air      Daily     Daily     LABS:                        12.0   8.40  )-----------( 121      ( 22 Sep 2022 06:20 )             34.4     09-22    138  |  102  |  9.4  ----------------------------<  155<H>  2.8<LL>   |  21.0<L>  |  0.76    Ca    8.0<L>      22 Sep 2022 06:20  Phos  1.0     -22  Mg     1.7     -22    TPro  5.4<L>  /  Alb  3.2<L>  /  TBili  0.8  /  DBili  x   /  AST  1963<H>  /  ALT  3062<H>  /  AlkPhos  61  09-22    LIVER FUNCTIONS - ( 22 Sep 2022 06:20 )  Alb: 3.2 g/dL / Pro: 5.4 g/dL / ALK PHOS: 61 U/L / ALT: 3062 U/L / AST: 1963 U/L / GGT: x           PT/INR - ( 22 Sep 2022 06:20 )   PT: 21.4 sec;   INR: 1.83 ratio         PTT - ( 22 Sep 2022 06:20 )  PTT:39.3 sec  Urinalysis Basic - ( 20 Sep 2022 12:56 )    Color: Yellow / Appearance: Clear / S.025 / pH: x  Gluc: x / Ketone: Trace  / Bili: Negative / Urobili: Negative mg/dL   Blood: x / Protein: 100 mg/dL / Nitrite: Negative   Leuk Esterase: Negative / RBC: 3-5 /HPF / WBC 0-2 /HPF   Sq Epi: x / Non Sq Epi: Few / Bacteria: Few          Imaging:             Chief Complaint:  Patient is a 37y old  Male who presents with a chief complaint of Acetaminophen toxicity (21 Sep 2022 15:41)      HPI:" 37 male with h/o bipolar disorder, found  unconscious and covered in emesis & intubated for airway protection in the setting of Alcohol, Tylenol and likely other medication co-ingestion(?olanzapine, gabapentin, valproat,oxy) and found to be tachycardic, tachypneic, and hypertensive with subsequent development of shock state requiring IV vasopressor therapy. Labs revealed leukocytosis, anion gap metabolic acidosis,high lactate, elevated CK mildly elevated transaminases and a serum acetaminophen level was 707 ug/mL Managed with activated charcoal 50 g via NG , enteral AND parenteral NAC, fomepizole .    Hospital course was characterized by extubation ,treatment of aspiration pneumonia and attainment of complete orientation to time place and person.                Allergies:  Abilify (Other)  Pt prefers Lactaid milk. (Unknown)      Home Medications:    Hospital Medications:  acetylcysteine 30 Gram(s),Dextrose 5% in Water 1000 milliLiter(s) 30 Gram(s) IV Continuous <Continuous>  cefTRIAXone Injectable. 1000 milliGRAM(s) IV Push every 24 hours  chlorhexidine 2% Cloths 1 Application(s) Topical <User Schedule>  enoxaparin Injectable 40 milliGRAM(s) SubCutaneous every 24 hours  folic acid 1 milliGRAM(s) Oral daily  lactated ringers. 1000 milliLiter(s) IV Continuous <Continuous>  multivitamin 1 Tablet(s) Oral daily  OLANZapine Injectable 10 milliGRAM(s) IntraMuscular every 8 hours PRN  potassium chloride  10 mEq/100 mL IVPB 10 milliEquivalent(s) IV Intermittent every 1 hour  potassium phosphate IVPB 30 milliMole(s) IV Intermittent once  sodium chloride 0.9% lock flush 10 milliLiter(s) IV Push every 1 hour PRN  thiamine 100 milliGRAM(s) Oral daily      PMHX/PSHX:  No pertinent past medical history    Anxiety    Post traumatic stress disorder (PTSD)    No pertinent past medical history    Bipolar disorder    Polysubstance abuse    No significant past surgical history    No significant past surgical history        Family history:  No pertinent family history in first degree relatives        Social History:     ROS:     General:  No wt loss, fevers, chills, night sweats, fatigue,   Eyes:  Good vision, no reported pain  ENT:  No sore throat, pain, runny nose, dysphagia  CV:  No pain, palpitations, hypo/hypertension  Resp:  No dyspnea, cough, tachypnea, wheezing  GI:  See HPI  :  No pain, bleeding, incontinence, nocturia  Muscle:  No pain, weakness  Neuro:  No weakness, tingling, memory problems  Psych:  No fatigue, insomnia, mood problems, depression  Endocrine:  No polyuria, polydipsia, cold/heat intolerance  Heme:  No petechiae, ecchymosis, easy bruisability  Skin:  No rash, edema      PHYSICAL EXAM:     GENERAL:  Appears stated age, well-groomed, well-nourished, no distress  HEENT:  NC/AT,  conjunctivae clear and pink,  no JVD  CHEST:  Full & symmetric excursion, no increased effort, breath sounds clear  HEART:  Regular rhythm, S1, S2, no murmur/rub/S3/S4, no abdominal bruit, no edema  ABDOMEN:  Soft, non-tender, non-distended, normoactive bowel sounds,  no masses ,  EXTREMITIES:  no cyanosis,clubbing or edema  SKIN:  No rash/erythema/ecchymoses/petechiae/wounds/abscess/warm/dry  NEURO:  Alert, oriented    Vital Signs:  Vital Signs Last 24 Hrs  T(C): 36.6 (22 Sep 2022 04:16), Max: 37.3 (21 Sep 2022 10:51)  T(F): 97.9 (22 Sep 2022 04:16), Max: 99.2 (21 Sep 2022 10:51)  HR: 84 (22 Sep 2022 04:16) (84 - 106)  BP: 114/69 (22 Sep 2022 04:16) (114/69 - 155/72)  BP(mean): 96 (21 Sep 2022 14:00) (88 - 116)  RR: 19 (22 Sep 2022 04:16) (12 - 19)  SpO2: 95% (21 Sep 2022 15:29) (95% - 96%)    Parameters below as of 22 Sep 2022 04:16  Patient On (Oxygen Delivery Method): room air      Daily     Daily     LABS:                        12.0   8.40  )-----------( 121      ( 22 Sep 2022 06:20 )             34.4     09-22    138  |  102  |  9.4  ----------------------------<  155<H>  2.8<LL>   |  21.0<L>  |  0.76    Ca    8.0<L>      22 Sep 2022 06:20  Phos  1.0       Mg     1.7         TPro  5.4<L>  /  Alb  3.2<L>  /  TBili  0.8  /  DBili  x   /  AST  1963<H>  /  ALT  3062<H>  /  AlkPhos  61      LIVER FUNCTIONS - ( 22 Sep 2022 06:20 )  Alb: 3.2 g/dL / Pro: 5.4 g/dL / ALK PHOS: 61 U/L / ALT: 3062 U/L / AST: 1963 U/L / GGT: x           PT/INR - ( 22 Sep 2022 06:20 )   PT: 21.4 sec;   INR: 1.83 ratio         PTT - ( 22 Sep 2022 06:20 )  PTT:39.3 sec  Urinalysis Basic - ( 20 Sep 2022 12:56 )    Color: Yellow / Appearance: Clear / S.025 / pH: x  Gluc: x / Ketone: Trace  / Bili: Negative / Urobili: Negative mg/dL   Blood: x / Protein: 100 mg/dL / Nitrite: Negative   Leuk Esterase: Negative / RBC: 3-5 /HPF / WBC 0-2 /HPF   Sq Epi: x / Non Sq Epi: Few / Bacteria: Few          Imaging:             Chief Complaint:  Patient is a 37y old  Male who presents with a chief complaint of Acetaminophen toxicity (21 Sep 2022 15:41)      HPI:" 37 male with h/o bipolar disorder, found  unconscious and covered in emesis & intubated for airway protection in the setting of Alcohol, Tylenol and likely other medication co-ingestion(?olanzapine, gabapentin, valproat,oxy) and found to be tachycardic, tachypneic, and hypertensive with subsequent development of shock state requiring IV vasopressor therapy. Labs revealed leukocytosis, anion gap metabolic acidosis,high lactate, elevated CK mildly elevated transaminases and a serum acetaminophen level was 707 ug/mL Managed with activated charcoal 50 g via NG , enteral AND parenteral NAC, fomepizole .    Hospital course was characterized by extubation ,treatment of aspiration pneumonia and attainment of complete orientation to time place and person   Antibiotics and acute elevation of LFTs from  max of 335 and ALT below 102 max of 3249, rising INR to max of 1.8 and rising CK-MB to 9414 concurrently with dropping Tylenol level of around 700 on presentation to less than 3.4 currently.    I spoke to the patient about the events before his ingestion of Tylenol and he states that for couple of days before that he was on the move walking unable to sit still continuously exerting himself by in his words "walking, walking, walking".  Was not forthcoming about any other drug recreational or otherwise congestion . Denied unprotected sexual activity etc.            Allergies:  Abilify (Other)  Pt prefers Lactaid milk. (Unknown)      Home Medications:    Hospital Medications:  acetylcysteine 30 Gram(s),Dextrose 5% in Water 1000 milliLiter(s) 30 Gram(s) IV Continuous <Continuous>  cefTRIAXone Injectable. 1000 milliGRAM(s) IV Push every 24 hours  chlorhexidine 2% Cloths 1 Application(s) Topical <User Schedule>  enoxaparin Injectable 40 milliGRAM(s) SubCutaneous every 24 hours  folic acid 1 milliGRAM(s) Oral daily  lactated ringers. 1000 milliLiter(s) IV Continuous <Continuous>  multivitamin 1 Tablet(s) Oral daily  OLANZapine Injectable 10 milliGRAM(s) IntraMuscular every 8 hours PRN  potassium chloride  10 mEq/100 mL IVPB 10 milliEquivalent(s) IV Intermittent every 1 hour  potassium phosphate IVPB 30 milliMole(s) IV Intermittent once  sodium chloride 0.9% lock flush 10 milliLiter(s) IV Push every 1 hour PRN  thiamine 100 milliGRAM(s) Oral daily      PMHX/PSHX:  No pertinent past medical history    Anxiety    Post traumatic stress disorder (PTSD)    No pertinent past medical history    Bipolar disorder    Polysubstance abuse    No significant past surgical history    No significant past surgical history        Family history:  No pertinent family history in first degree relatives        Social History:     ROS:     General:  No wt loss, fevers, chills, night sweats, fatigue,   Eyes:  Good vision, no reported pain  ENT:  No sore throat, pain, runny nose, dysphagia  CV:  No pain, palpitations, hypo/hypertension  Resp:  No dyspnea, cough, tachypnea, wheezing  GI:  See HPI  :  No pain, bleeding, incontinence, nocturia  Muscle:  No pain, weakness  Neuro:  No weakness, tingling, memory problems  Psych:  No fatigue, insomnia, mood problems, depression  Endocrine:  No polyuria, polydipsia, cold/heat intolerance  Heme:  No petechiae, ecchymosis, easy bruisability  Skin:  No rash, edema      PHYSICAL EXAM:     GENERAL:  Appears stated age, well-groomed, well-nourished, no distress  HEENT:  NC/AT,  conjunctivae clear and pink,  no JVD  CHEST:  Full & symmetric excursion, no increased effort, breath sounds clear  HEART:  Regular rhythm, S1, S2, no murmur/rub/S3/S4, no abdominal bruit, no edema  ABDOMEN:  Soft, non-tender, non-distended, normoactive bowel sounds,  no masses ,  EXTREMITIES:  no cyanosis,clubbing or edema  SKIN:  No rash/erythema/ecchymoses/petechiae/wounds/abscess/warm/dry  NEURO:  Alert, oriented    Vital Signs:  Vital Signs Last 24 Hrs  T(C): 36.6 (22 Sep 2022 04:16), Max: 37.3 (21 Sep 2022 10:51)  T(F): 97.9 (22 Sep 2022 04:16), Max: 99.2 (21 Sep 2022 10:51)  HR: 84 (22 Sep 2022 04:16) (84 - 106)  BP: 114/69 (22 Sep 2022 04:16) (114/69 - 155/72)  BP(mean): 96 (21 Sep 2022 14:00) (88 - 116)  RR: 19 (22 Sep 2022 04:16) (12 - 19)  SpO2: 95% (21 Sep 2022 15:29) (95% - 96%)    Parameters below as of 22 Sep 2022 04:16  Patient On (Oxygen Delivery Method): room air      Daily     Daily     LABS:                        12.0   8.40  )-----------( 121      ( 22 Sep 2022 06:20 )             34.4     09    138  |  102  |  9.4  ----------------------------<  155<H>  2.8<LL>   |  21.0<L>  |  0.76    Ca    8.0<L>      22 Sep 2022 06:20  Phos  1.0       Mg     1.7         TPro  5.4<L>  /  Alb  3.2<L>  /  TBili  0.8  /  DBili  x   /  AST  1963<H>  /  ALT  3062<H>  /  AlkPhos  61  22    LIVER FUNCTIONS - ( 22 Sep 2022 06:20 )  Alb: 3.2 g/dL / Pro: 5.4 g/dL / ALK PHOS: 61 U/L / ALT: 3062 U/L / AST: 1963 U/L / GGT: x           PT/INR - ( 22 Sep 2022 06:20 )   PT: 21.4 sec;   INR: 1.83 ratio         PTT - ( 22 Sep 2022 06:20 )  PTT:39.3 sec  Urinalysis Basic - ( 20 Sep 2022 12:56 )    Color: Yellow / Appearance: Clear / S.025 / pH: x  Gluc: x / Ketone: Trace  / Bili: Negative / Urobili: Negative mg/dL   Blood: x / Protein: 100 mg/dL / Nitrite: Negative   Leuk Esterase: Negative / RBC: 3-5 /HPF / WBC 0-2 /HPF   Sq Epi: x / Non Sq Epi: Few / Bacteria: Few          Imaging:

## 2022-09-22 NOTE — BH CONSULTATION LIAISON ASSESSMENT NOTE - [id number]_PSY_ALL_CORE
R/O Schizoaffective disorder   F25.9  R/O Severe bipolar affective disorder with psychosis   F31.89

## 2022-09-22 NOTE — PROGRESS NOTE ADULT - ASSESSMENT
37 y.o. M with psych hx since age 13, h/o PTSD, in the past diagnosed with bipolar with psychotic features, with h/o multiple admissions (6) and 2-3 Suicidal attempts, (cutting b/l wrists and dorsal aspect of left arm and by overdose on aspirin),last hospitalized in [facility] on 8/23/22 for paranoia and suicidal ideation but was evaluated by psych and deemed able to be discharged home. Pt presented with AMS/ unresponsiveness. Per hx by family recent heavy alcohol intake and found unresponsive with multiple pill bottles near him concerning for overdose (tylenol/ zyprexa/gabapentin/ oxycodone). In the ER Noted with elevated acetaminophen level >600 range, AG metabolic acidosis. Pt was intubated for airway protection and admitted to the MICU for acute metabolic encephalopathy secondary to polysubstance ingestion and tylenol toxicity. Pt went into shock thought to be secondary to sedatives and required pressor support. S/p activated charcoal and continued on NAC as well as fomepizole, nephro consulted but there was no role for renal replacement therapy as pts acetaminophen level continued to down trend. Pt was able to be extubated today and weaned off of pressor support. Shiley cath was removed. Although tylenol level is down trended pts liver enzymes increased significantly. Pt deemed appropriate to be downgraded from the MICU to the hospitalist service.     Acute metabolic encephalopathy secondary to poly substance abuse   underlying hx Bipolar DO (severe depression)/ suicidal ideation/ attempts and PTSD   s/p intubation for airway protection on 9/20 and extubated 9/21   s/p pressor support secondary to shock from sedatives   U tox positive for THZ, lithium and salicylate negative   c/w treatment for for tylenol toxicity   c/w electrolyte management- replaced hypokalemia with potassium ryders 10meq x 3 doses and potassium chloride 40meq x 2 doses with improvement of potassium to 3.3 - additional potassium chloride 40meq given and repeat labs ordered for 6pm   - hypernatremia resolved    -c/w zyprexa prn for breakthrough agitation   - pt based on recent ED hospitalization as well as outpt med rec for sept  is on the following meds which are currently being held until psych further evaluates the pt - holding gabapentin 300mg po tid, benztropine 0.5mg po bid, clonazepam 0.5mg po qd prn,  fluoxetine 10mg po qd   - c/w constant observation 1:1 for suicidal ideation   - safety tray   - Psych consulted and awaiting further recommendations      Tylenol toxicity   - tylenol level currently 41.8 and is on a down trend - repeat ordered at 6pm and tomorrow morning   - lfts up trended from    to   AST  1130  ALT 1152  - Will monitor the enzymes daily   - PT 19  and INR: 1.6   - C/w NAC infusion -   25 mg/kg/hr indefinitely until tylenol level is undetectable and the LFTs are at least below 50% of the peak   - Per MICU D/C Fomepizole however discussed with Toxicology attending and fellow and they highly recommend to c/w fomepizole until tylenol level is undetectable - d/w MICU Dr Holman as well and for now pt s/p 1 dose will repeat labs at 6pm and if tylenol level undetectable will c/w NAC if not then pt will return to MICU to c/w fomepizole until level is undetectable   - Trend LFTs/ tylenol/ coags and CMP  - Hold hepatotoxic agents  - will c/w trending tylenol level twice daily - repeat tonight and then again tomorrow morning - aim to reach undetectable range     Rhabdomyolysis  - likely secondary to unclear down time   - ck level down trend to 5379  -c/w LR @ 175ml/ hr   - trending ck level daily   - nephro f/u noted and appreciated     Aspiration pna   - up trending wbc to 20 - with left shift   - afebrile but noted temps 99 and intermittent tachycardia  -f/u b cx   - UA negative - f/u U cx   - Apoorva was removed today   - cxr negative but noted on ct c spine with concern for Left upper lobe airway disease   - f/u ct chest to rule out asp pna as pt was found unresponsive with emesis all around him.     Electrolyte disturbances   Hypokalemia and Hypophosphatemia      Alcohol abuse  - alcohol level <10   - pt is not giving further insight into his drinking habits   - c/w CIWA protocol   - c/w aggressive Electrolyte management as above   - for now will remain off of librium - unknown last drink and pt s/p sedative weaning   -c/w thiamine, folic acid and MVI   -c/w zyprexa prn for agitation      Hyperglycemia   - f/u Hba1c  - glucose on serum has been 150-190 range   - will hold off on accuchecks / lsiding scale as pt will become more agitated unless pt has significant persistent elevation     Hypertension   - noted sbp 140-150 range - likely from anxiety/ agitation   - c/w monitoring bp closely if remains elevated will start norvasc   -c/w monitoring bp closely      DVT PPx - c/w Lovenox SQ      Activity level: Increase as tolerated    Dispo: Pt to be transferred out of the MICU to the medical floor- hospitalist service. Pt remains high risk secondary to need for NAC and worsening liver failure.          Addendum   bmp revealed persistent metabolic acidosis but with improvement in AG   Also with persistent hypokalemia despite repletion- additional potassium chloride 40meq q4 hrs x 2 doses given   LFTS continue to up trend now in the 2000 range - will expect this number to peak and will c/w NAC - f/u with toxicology again in the am as the hepatic function is being monitored  - will consult hepatology in the am   - monitoring hepatic function closely   - tylenol level continues to drop now at 19 - not yet undetectable     ct chest returned   Consolidation of the lingula and patchy opacities within left upper lobe likely representing pneumonia.   And pt was started on rocephin prior to these findings so c/w abx and f.u cbc in am and final results of B cx in the am    37 y.o. M with psych hx since age 13, h/o PTSD, in the past diagnosed with bipolar with psychotic features, with h/o multiple admissions (6) and 2-3 Suicidal attempts, (cutting b/l wrists and dorsal aspect of left arm and by overdose on aspirin),last hospitalized in [facility] on 8/23/22 for paranoia and suicidal ideation but was evaluated by psych and deemed able to be discharged home. Pt presented with AMS/ unresponsiveness. Per hx by family recent heavy alcohol intake and found unresponsive with multiple pill bottles near him concerning for overdose (tylenol/ zyprexa/gabapentin/ oxycodone). In the ER Noted with elevated acetaminophen level >600 range, AG metabolic acidosis. Pt was intubated for airway protection and admitted to the MICU for acute metabolic encephalopathy secondary to polysubstance ingestion and tylenol toxicity. Pt went into shock thought to be secondary to sedatives and required pressor support. S/p activated charcoal and continued on NAC as well as fomepizole, nephro consulted but there was no role for renal replacement therapy as pts acetaminophen level continued to down trend. Pt was able to be extubated today and weaned off of pressor support. Shiley cath was removed. Although tylenol level is down trended pts liver enzymes increased significantly. Pt deemed appropriate to be downgraded from the MICU to the hospitalist service.     Acute metabolic encephalopathy secondary to poly substance abuse   underlying hx Bipolar DO (severe depression)/ suicidal ideation/ attempts and PTSD   s/p intubation for airway protection on 9/20 and extubated 9/21   s/p pressor support secondary to shock from sedatives   U tox positive for THZ, lithium and salicylate negative   c/w treatment for for tylenol toxicity   Hold zyprexa for now   Hold all Psych meds until seen by Psych   c/w constant observation 1:1 for suicidal ideation   Psych consulted     Tylenol toxicity   Tylenol level currently wnl   AST trending down but ALT ALT still trending up   INR now trending down   c/w NAC infusion, 25 mg/kg/hr indefinitely until tylenol level is undetectable and the LFTs are at least below 50% of the peak   s/p Fomepizole in MICU   Trend LFTs/ tylenol/ coags and CMP  Hold hepatotoxic agents    Acute liver failure  Hepatology consult   Trend LFTs  Work up sent per Hepatology   CT A/P triphasic ordered     Rhabdomyolysis  likely secondary to unclear down time   trending up   Increase IVF rate   nephro f/u noted and appreciated     Aspiration pna   Leukocytosis improving   CT Chest: Consolidation of the lingula and patchy opacities within left upper lobe likely representing pneumonia  c/w Rocephin   f/u b cx     UA negative - f/u U cx     Electrolyte disturbances   Hypokalemia and Hypophosphatemia   Replace IV and PO   Monitor      Alcohol abuse  Alcohol level <10   c/w CIWA protocol   for now will remain off of librium - unknown last drink and pt s/p sedative weaning   c/w thiamine, folic acid and MVI      Hyperglycemia   Hba1c 5.6   Improving     Hypertension   likely from anxiety/ agitation   c/w monitoring BP closely if remains elevated will start norvasc      DVT PPx - c/w Lovenox SQ      Activity level: Increase as tolerated    Dispo: remains active

## 2022-09-22 NOTE — BH CONSULTATION LIAISON ASSESSMENT NOTE - HPI (INCLUDE ILLNESS QUALITY, SEVERITY, DURATION, TIMING, CONTEXT, MODIFYING FACTORS, ASSOCIATED SIGNS AND SYMPTOMS)
The patient is a 37 year old male, single, no kids, domiciled with his parents in [place], unemployed, history of 7+ psychiatric inpatient hospitalizations, most recent at [facility] for 2 days at the end of August 22 for SI, paranoia and aggressive behavior, presented at this admission for severe overdose on Tylenol overdose requiring ICU admission.  Psychiatry was consulted for evaluation of suicidal ideating and psychosis.    On my exam with the patient he appeared calm and in no apparent distress.  Overall his demeanor was pleasant.  Made good eye contact.  His primary concern was whether he was going to live or not.  He denied being able to remember the circumstances that led to him ODing on Tylenol but flatly denied having any suicidal thoughts at the time.  Overall seemed to be minimizing the actions that led to this hospitalization.  However he does say that he does hear voices, that they seemed to emanate from corners, that they change and not necessarily the same voices, cause him great distress, they are disturbing and threatening, and that they say that they are going to kill him and his parents.  He denies that they are commanding in nature.  He says that they started about 6 months prior after he had had an appendectomy.      With the patient's permission I obtained collateral from the patient's parents Rashaun at [phone].  They says that starting about a week after he was discharged from the ED at [facility] on Aug 23, 2022 the patient began to have worsening paranoid delusions and auditory hallucinations.  During this time he became increasingly paranoid of the TV, thinking that it was broadcasting signals that were controlling him and his parents.  This led him to unplug all the TVs and hide them in the basement.  He said he could not touch people because they had bad energy, accused his parents of colluding with the authorities to try and kill him.  They say that he was appearing to be internally preoccupied and could be heard telling the voices to stop.  He told them that the voices were telling him not to trust any one, that "people" were against him and that he should kill his parents.      They says that the patient has a spotty history of medic    They say that the patient started to have disturbing auditory hallucinations starting 10-15 yrs prior.   The patient is a 37 year old male, single, no kids, domiciled with his parents in [place], unemployed, history of 7+ psychiatric inpatient hospitalizations, most recent at [facility] for 2 days at the end of August 22 for SI, paranoia and aggressive behavior, presented at this admission for severe overdose on Tylenol overdose requiring ICU admission.  Psychiatry was consulted for evaluation of suicidal ideating and psychosis.    On my exam with the patient he appeared calm and in no apparent distress.  Overall his demeanor was pleasant.  Made good eye contact.  His primary concern was whether he was going to live or not.  He denied being able to remember the circumstances that led to him ODing on Tylenol but flatly denied having any suicidal thoughts at the time.  Overall seemed to be minimizing the actions that led to this hospitalization.  However he does say that he does hear voices, that they seemed to emanate from corners, that they change and not necessarily the same voices, cause him great distress, they are disturbing and threatening, and that they say that they are going to kill him and his parents.  He denies that they are commanding in nature.  He says that they started about 6 months prior after he had had an appendectomy.      With the patient's permission I obtained collateral from the patient's parents Rashaun at [phone].  They says that starting about a week after he was discharged from the ED at [facility] on Aug 23, 2022 the patient began to have worsening paranoid delusions and auditory hallucinations.  During this time he became increasingly paranoid of the TV, thinking that it was broadcasting signals that were controlling him and his parents.  This led him to unplug all the TVs and hide them in the basement.  He said he could not touch people because they had bad energy, accused his parents of colluding with the authorities to try and kill him.  They say that he was appearing to be internally preoccupied and could be heard telling the voices to stop.  He told them that the voices were telling him not to trust any one, that "people" were against him and that he should kill his parents.      They say that the patient started to have disturbing auditory hallucinations and disorganized behaviour starting 10-15 yrs prior.  He has had 7+ inpatient hospitalizations including 3 time at [facility] and several times at [facility].  He follows with [facility] and sees Dr. Amaya for med management.  Current meds appear to be Klonopin .5mg TID, Olanzapine 10mg HS, Gabapentin 300mg TID.  The family reports that he has a history of noncompliance but can not say for sure if he has been regular with his meds as he is the one that takes manages them.  They do say that he has been taking Klonopin for 8 months and has been increasing his use of it and recently has been running out of his month script in one weeks time.  No history of taking a ANDERSON.  Note that the family says the patient has gained about 40 lbs since he started taking olanzapine about 2 years ago.  He has had 3-4 suicide attempts (cutting wrists, neck, overdose on ASA) as well as this admission for overdose on Tylenol (Per EMS report at this admit, family stated pt has a history of polysubstance abuse (mixing pills). No witnessed ingestion but multiple pill bottles found including Tylenol, valproate, olanzapine, oxycodone, gabapentin, and other OTCs were found near his person.)  Family says that the patient used to drink excessively and regularly resulting in violent altercations some of which resulted in ED visits (several at [facility]).   The patient says that he does not drink now. Also denies using cannabis.      The parents say that the patient dropped out of  before starting 11th grade due to running with a bad crowd and chronic truancy.  He later got his GED.  They say however that he has never had a stable job that lasted more than several weeks because his paranoia.  Overall they say that his ablility to functions has progressively gotten worse over the years.      Parents deny any significant family history of mental illness or suicidal behaviour.

## 2022-09-22 NOTE — CONSULT NOTE ADULT - CONSULT REASON
Acidemia   HAGMA
Massive acetaminophen overdose
Elevated LFT's: Multisubstance toxicity
Hospitalist medicine downgrade

## 2022-09-22 NOTE — BH CONSULTATION LIAISON ASSESSMENT NOTE - CURRENT MEDICATION
MEDICATIONS  (STANDING):  acetylcysteine 30 Gram(s),Dextrose 5% in Water 1000 milliLiter(s) 30 Gram(s) (100 mL/Hr) IV Continuous <Continuous>  cefTRIAXone Injectable. 1000 milliGRAM(s) IV Push every 24 hours  chlorhexidine 2% Cloths 1 Application(s) Topical <User Schedule>  enoxaparin Injectable 40 milliGRAM(s) SubCutaneous every 24 hours  folic acid 1 milliGRAM(s) Oral daily  lactated ringers. 1000 milliLiter(s) (150 mL/Hr) IV Continuous <Continuous>  multivitamin 1 Tablet(s) Oral daily  potassium phosphate / sodium phosphate Tablet (K-PHOS No. 2) 1 Tablet(s) Oral four times a day  potassium phosphate IVPB 30 milliMole(s) IV Intermittent once  thiamine 100 milliGRAM(s) Oral daily    MEDICATIONS  (PRN):  sodium chloride 0.9% lock flush 10 milliLiter(s) IV Push every 1 hour PRN Pre/post blood products, medications, blood draw, and to maintain line patency

## 2022-09-22 NOTE — BH CONSULTATION LIAISON ASSESSMENT NOTE - NSBHCHARTREVIEWVS_PSY_A_CORE FT
Vital Signs Last 24 Hrs  T(C): 36.4 (22 Sep 2022 10:56), Max: 37.2 (21 Sep 2022 17:15)  T(F): 97.6 (22 Sep 2022 10:56), Max: 98.9 (21 Sep 2022 17:15)  HR: 72 (22 Sep 2022 10:56) (72 - 106)  BP: 119/84 (22 Sep 2022 10:56) (114/69 - 153/84)  BP(mean): 96 (21 Sep 2022 14:00) (96 - 96)  RR: 19 (22 Sep 2022 10:56) (18 - 19)  SpO2: 100% (22 Sep 2022 10:56) (95% - 100%)    Parameters below as of 22 Sep 2022 10:56  Patient On (Oxygen Delivery Method): room air

## 2022-09-22 NOTE — BH CONSULTATION LIAISON ASSESSMENT NOTE - SUMMARY
The patient is a 37 year old male, single, no kids, domiciled with his parents in [place], unemployed, history of 7+ psychiatric inpatient hospitalizations, most recent at [facility] for 2 days at the end of August 22 for SI, paranoia and aggressive behavior, presented at this admission for severe overdose on Tylenol overdose requiring ICU admission.  Psychiatry was consulted for evaluation of suicidal ideating and psychosis.    The patient has a long history of multiple psychotic breaks requiring at least 7 psychiatric hospitalizations going back years.  He started having perceptual disturbances about 10-15 years prior.  The voices he hears are threatening and disturbing, they say that they are going to kill him and his parents, sometimes they command him to harm his parents.  Additionally, he has had long standing paranoid delusions where he believes he is being watched, that his family is colluding with the police, that the TV is telegraphing thoughts into him and his family's brains, and is paranoid of touching people for fear of "bad energy".  These perceptual disturbances and paranoias have been going on for years and at this admission resulted in a suicide attempt.  They have had a marked effect on the patient's ability to function as he has not been able to hold a job for more than a couple weeks and has now not had  job in years.  He has poor social relationships as a result of his illness.  He is not able to successfully tara out his ADLs without the constant supervision and support of his parents.  On top of this the patient appears to have some degree of long standing EtOH and cannabis abuse.  Because of this and the limited histories given by the patient and collateral it unclear at this time if there is an affective (manic or depressive) component to the patient's behaviour.      DDx:  -  Schizophrenia  -  EtOH use disorder  -  R/O BP with psychotic features, Schizoaffective Disorder.    Plan:  -  On exam he does not appear represent an acute threat to self however considering his history and recent attempt he is none the less at high risk for self injurious behaviour.  Would recommend constant 1:1 observation for suicide risk.    -  I will follow up with the family for a more complete review of past psych meds, will consider addition of paliperidone as this metabolite of risperidone does not require liver metabolism.  -  Recommend Ativan 1mg PO Q6 PRN for anxiety or agitation, 2mg IM for sever agitation  -  Recommend at this time avoiding first and second gen antipsychotics due to severely elevated LFTs and history of side effects (tongue swelling with Abilify)

## 2022-09-22 NOTE — BH CONSULTATION LIAISON ASSESSMENT NOTE - RISK ASSESSMENT
Risk factors: impulsivity, hx of self- injurious behavior, prior suicidality, previous suicide attempts, prior hospitalizations, hx of substance abuse, occupational decline, absence of outpatient follow-up, limited insight into symptoms, poor reactivity to stressors, difficulty expressing emotions, hx of abuse medication non- compliance.   Protective factors: Pt denies any active suicidal ideation/intent/plan, no family hx, has no acute affective or psychotic disorder, has responsibility to family and others, identifies reasons for living, fear of death/dying due to pain/suffering,  supportive family, no access to firearms.     Based on risk assessment evaluation, Pt DOES/DOES NOT appear to be at imminent risk of harm to self or others at this time.

## 2022-09-22 NOTE — BH CONSULTATION LIAISON ASSESSMENT NOTE - FAMILY HISTORY OF PSYCHIATRIC ILLNESS / SUICIDALITY
with age. · Ears:  External Ears: Bilateral normal.               TM's & External Canals: normal TM's and external ear canals bilaterally. · Nose:   There is clear rhinorrhea. · Sinuses: no Bilateral maxillary sinus tenderness. no Bilateral frontal sinus tenderness. · Mouth:  normal tongue and buccal mucosa. · Throat: mild erythema. Airway Patent. · Neck:  Supple. No meningeal signs. There is no  preauricular and anterior cervical node tenderness. · Respiratory:   Breath sounds: Bilateral normal.  Lung sounds: normal.   · CV:  Regular rate and rhythm, normal heart sounds, without pathological murmurs, ectopy, gallops, or rubs. · GI:  Abdomen Soft, nontender, good bowel sounds. No firm or pulsatile mass. · Integument:  Normal turgor. Warm, dry, without visible rash. · Neurological:  Oriented. Motor functions intact. Lab / Imaging Results   (All laboratory and radiology results have been personally reviewed by myself)  Labs:  Results for orders placed or performed during the hospital encounter of 06/07/22   Strep Screen Group A Throat    Specimen: Throat   Result Value Ref Range    Strep Grp A PCR Negative Negative   COVID-19 & Influenza Combo    Specimen: Nasopharyngeal Swab   Result Value Ref Range    SARS-CoV-2 RNA, RT PCR NOT DETECTED NOT DETECTED    INFLUENZA A NOT DETECTED NOT DETECTED    INFLUENZA B NOT DETECTED NOT DETECTED       Imaging: All Radiology results interpreted by Radiologist unless otherwise noted. No orders to display       ED Course / Medical Decision Making   Medications - No data to display     Re-examination:  6/7/22       Time: 2022  Patients condition is improving after treatment. Consults:   None    Procedures:   none    Medical Decision Making: At this time the patient is without objective evidence of an acute process requiring hospitalization or inpatient management.   They have remained hemodynamically stable throughout their entire ED visit and are stable for discharge with outpatient follow-up. The plan has been discussed in detail and they are aware of the specific conditions for emergent return, as well as the importance of follow-up. This time is nontoxic in appearance in no distress she was placed on medication for symptoms. Patient was educated that if symptoms progress to return the emergency department for repeat COVID swab as she was exposed to Geo however may have not reached the viral load able to be tested for COVID. Patient is agreeable to plan of care all questions were answered. Patient  neurologically vascular muscularly intact. Stable for close outpatient follow-up. Assessment     1. Acute upper respiratory infection      Plan   Discharged home. Patient condition is good    New Medications     Discharge Medication List as of 6/7/2022  8:29 PM      START taking these medications    Details   guaiFENesin (MUCINEX) 600 MG extended release tablet Take 1 tablet by mouth 2 times daily for 15 days, Disp-30 tablet, R-0Normal      benzonatate (TESSALON) 200 MG capsule Take 1 capsule by mouth 3 times daily as needed for Cough, Disp-21 capsule, R-0Normal           Electronically signed by MAYNOR Swan CNP   DD: 6/7/22  **This report was transcribed using voice recognition software. Every effort was made to ensure accuracy; however, inadvertent computerized transcription errors may be present.   END OF ED PROVIDER NOTE         MAYNOR Yee CNP  06/07/22 [id number] None known

## 2022-09-22 NOTE — BH CONSULTATION LIAISON ASSESSMENT NOTE - DESCRIPTION
The parents say that the patient dropped out of  before starting 11th grade due to running with a bad crowd and chronic truancy.  He later got his GED.  They say however that he has never had a stable job that lasted more than several weeks because his paranoia.  Overall they say that his ablility to functions has progressively gotten worse over the years.

## 2022-09-22 NOTE — CONSULT NOTE ADULT - ASSESSMENT
37 male with h/o bipolar disorder, found  unconscious and covered in emesis & intubated for airway protection in the setting of Alcohol, Tylenol and likely other medication co-ingestion(?olanzapine, gabapentin, valproat,oxy) and found to be tachycardic, tachypneic, and hypertensive  with Hospital course was characterized by acute elevation of LFTs from  max of 335 and ALT below 102 max of 3249, rising INR to max of 1.8 and rising CK-MB to 9414 concurrently with dropping Tylenol level of around 700 on presentation to less than 3.4 currently.    Patient maintains his orientation in time place and person.  Labs ordered to rule out various etiologies of acute liver compromise including viral hepatitis ABC E EBV HSV CMV VZV, autoimmune serologies, Amando's etc.  CT Abdo pelvis with and without IV ordered to rule out any local vascular compromise of the liver.    Given his strenuous severe activity 4 days prior to congestion of alcohol Tylenol and likely other substances like olanzapine etc. neuroleptic malignant syndrome or other metabolic/drug-induced myopathies with subsequent rhabdomyolysis should be considered as an etiology with the rise in INR being a manifestation of DIC rather than acute liver compromise.  Await labs  Continue NAC.  We will follow.  Given chronic suicidality he is not a candidate for liver transplantation.

## 2022-09-22 NOTE — BH CONSULTATION LIAISON ASSESSMENT NOTE - ADDITIONAL DETAILS ALL
Patient has prepared slurries of medications including Tylenol, valproate, olanzapine, oxycodone, gabapentin, and other OTCs which he mixes with liqueur.

## 2022-09-22 NOTE — BH CONSULTATION LIAISON ASSESSMENT NOTE - PATIENT'S CHIEF COMPLAINT
"I don't know what happened, am I going to die, I don't want to die, I would never do anything like that to hurt myself, I promise never to do it again"

## 2022-09-22 NOTE — PROGRESS NOTE ADULT - ASSESSMENT
The patient is a 37 year old male with psych history since age of 13 which includes bipolar disorder with psychotic features, post traumatic stress disorder, prior history of suicidal ideation, with history of multiple inpatient psych admissions, whom (per documentation) was found unresponsive in his apartment, covered in black emesis. He was also found surrounded by bottles containing acetaminophen, valproate, olanzapine, oxycodone, gabapentin, and other OTCs. Per documentation, family stated that the patient has a history of overdosing on pills. He was emergently intubated in the ED due to obtundation/airway protection. Admitted for acetaminophen toxicity (acetaminophen < 600). Hospital course notable for elevated osmolar gap concerning for coingestion, s/p emergent hemodialysis on 9/20.     -On admission, osmolar gap of ~30  -s/p emergent hemodialysis on 9/20     -Mental status appears to be his baseline (delusion/paranoia) - articulated that his parents are "out to sabotage him" which is on par with the behaviour that he has been exhibiting lately since self-discontinuation of his psych meds as per patient's father     -Renal function remains at baseline at this time; Remains non oliguric   -Continues to have high anion gap - suspecting from 5-oxoproline from acetaminophen toxicity + mildly from lactic acid   -Rhabdomyolysis (CPK 5300+) - continue LR - would increase rate from 50cc/hr to 75cc/hr; continue to trend CPK daily with a.m. labs   -HypoK - S/P  repletion   -Acetaminophen toxicity - improving - latest acetaminophen level is 102 (from > 600 on admission) - continue medical management as per Toxicology recommendations ,    Trend UO, CK, Scr., & Myoglobin,     1: 1 Care, Not agitated,,     L - Hand swollen,   D/W RN, Aide,

## 2022-09-22 NOTE — BH CONSULTATION LIAISON ASSESSMENT NOTE - OTHER PAST PSYCHIATRIC HISTORY (INCLUDE DETAILS REGARDING ONSET, COURSE OF ILLNESS, INPATIENT/OUTPATIENT TREATMENT)
Auditory hallucinations for last 10-15 yrs, History of Bipolar Disorder 1 with psychotic features, 3 time at [facility], [facility], [facility], [facility] and several times at [facility]

## 2022-09-22 NOTE — BH CONSULTATION LIAISON ASSESSMENT NOTE - VIOLENCE RISK FACTORS:
Substance abuse/Impulsivity/Command hallucinations for violent behavior/Lack of insight into violence risk/need for treatment/Noncompliance with treatment

## 2022-09-22 NOTE — BH CONSULTATION LIAISON ASSESSMENT NOTE - NSICDXPASTMEDICALHX_GEN_ALL_CORE_FT
PAST MEDICAL HISTORY:  Anxiety     Bipolar disorder     Polysubstance abuse     Post traumatic stress disorder (PTSD)     S/P appendectomy

## 2022-09-23 DIAGNOSIS — F10.10 ALCOHOL ABUSE, UNCOMPLICATED: ICD-10-CM

## 2022-09-23 LAB
ALBUMIN SERPL ELPH-MCNC: 3.1 G/DL — LOW (ref 3.3–5.2)
ALP SERPL-CCNC: 51 U/L — SIGNIFICANT CHANGE UP (ref 40–120)
ALT FLD-CCNC: 1899 U/L — HIGH
ANION GAP SERPL CALC-SCNC: 11 MMOL/L — SIGNIFICANT CHANGE UP (ref 5–17)
AST SERPL-CCNC: 412 U/L — HIGH
BASOPHILS # BLD AUTO: 0.02 K/UL — SIGNIFICANT CHANGE UP (ref 0–0.2)
BASOPHILS NFR BLD AUTO: 0.4 % — SIGNIFICANT CHANGE UP (ref 0–2)
BILIRUB SERPL-MCNC: 1.2 MG/DL — SIGNIFICANT CHANGE UP (ref 0.4–2)
BUN SERPL-MCNC: 3.6 MG/DL — LOW (ref 8–20)
CALCIUM SERPL-MCNC: 8.2 MG/DL — LOW (ref 8.4–10.5)
CHLORIDE SERPL-SCNC: 105 MMOL/L — SIGNIFICANT CHANGE UP (ref 98–107)
CK MB CFR SERPL CALC: 7.3 NG/ML — HIGH (ref 0–6.7)
CK SERPL-CCNC: 4120 U/L — HIGH (ref 30–200)
CMV DNA CSF QL NAA+PROBE: SIGNIFICANT CHANGE UP
CMV DNA SPEC NAA+PROBE-LOG#: SIGNIFICANT CHANGE UP LOG10IU/ML
CMV IGM FLD-ACNC: <8 AU/ML — SIGNIFICANT CHANGE UP
CMV IGM SERPL QL: NEGATIVE — SIGNIFICANT CHANGE UP
CO2 SERPL-SCNC: 27 MMOL/L — SIGNIFICANT CHANGE UP (ref 22–29)
CREAT SERPL-MCNC: 0.62 MG/DL — SIGNIFICANT CHANGE UP (ref 0.5–1.3)
EBV DNA SERPL NAA+PROBE-ACNC: SIGNIFICANT CHANGE UP IU/ML
EBV EA AB SER IA-ACNC: <5 U/ML — SIGNIFICANT CHANGE UP
EBV EA AB TITR SER IF: POSITIVE
EBV EA IGG SER-ACNC: NEGATIVE — SIGNIFICANT CHANGE UP
EBV NA IGG SER IA-ACNC: 218 U/ML — HIGH
EBV PATRN SPEC IB-IMP: SIGNIFICANT CHANGE UP
EBV VCA IGG AVIDITY SER QL IA: POSITIVE
EBV VCA IGM SER IA-ACNC: <10 U/ML — SIGNIFICANT CHANGE UP
EBV VCA IGM SER IA-ACNC: >750 U/ML — HIGH
EBV VCA IGM TITR FLD: NEGATIVE — SIGNIFICANT CHANGE UP
EBVPCR LOG: SIGNIFICANT CHANGE UP LOG10IU/ML
EGFR: 126 ML/MIN/1.73M2 — SIGNIFICANT CHANGE UP
EOSINOPHIL # BLD AUTO: 0.08 K/UL — SIGNIFICANT CHANGE UP (ref 0–0.5)
EOSINOPHIL NFR BLD AUTO: 1.7 % — SIGNIFICANT CHANGE UP (ref 0–6)
GLUCOSE SERPL-MCNC: 137 MG/DL — HIGH (ref 70–99)
HCT VFR BLD CALC: 30 % — LOW (ref 39–50)
HGB BLD-MCNC: 10.6 G/DL — LOW (ref 13–17)
IMM GRANULOCYTES NFR BLD AUTO: 0.6 % — SIGNIFICANT CHANGE UP (ref 0–0.9)
INR BLD: 1.31 RATIO — HIGH (ref 0.88–1.16)
LYMPHOCYTES # BLD AUTO: 0.89 K/UL — LOW (ref 1–3.3)
LYMPHOCYTES # BLD AUTO: 18.8 % — SIGNIFICANT CHANGE UP (ref 13–44)
MAGNESIUM SERPL-MCNC: 1.7 MG/DL — SIGNIFICANT CHANGE UP (ref 1.6–2.6)
MCHC RBC-ENTMCNC: 29.1 PG — SIGNIFICANT CHANGE UP (ref 27–34)
MCHC RBC-ENTMCNC: 35.3 GM/DL — SIGNIFICANT CHANGE UP (ref 32–36)
MCV RBC AUTO: 82.4 FL — SIGNIFICANT CHANGE UP (ref 80–100)
MONOCYTES # BLD AUTO: 0.18 K/UL — SIGNIFICANT CHANGE UP (ref 0–0.9)
MONOCYTES NFR BLD AUTO: 3.8 % — SIGNIFICANT CHANGE UP (ref 2–14)
MYOGLOBIN SERPL-MCNC: 136 NG/ML — HIGH (ref 28–72)
NEUTROPHILS # BLD AUTO: 3.54 K/UL — SIGNIFICANT CHANGE UP (ref 1.8–7.4)
NEUTROPHILS NFR BLD AUTO: 74.7 % — SIGNIFICANT CHANGE UP (ref 43–77)
PHOSPHATE SERPL-MCNC: 1.9 MG/DL — LOW (ref 2.4–4.7)
PLATELET # BLD AUTO: 103 K/UL — LOW (ref 150–400)
POTASSIUM SERPL-MCNC: 3 MMOL/L — LOW (ref 3.5–5.3)
POTASSIUM SERPL-SCNC: 3 MMOL/L — LOW (ref 3.5–5.3)
PROT SERPL-MCNC: 5.1 G/DL — LOW (ref 6.6–8.7)
PROTHROM AB SERPL-ACNC: 15.2 SEC — HIGH (ref 10.5–13.4)
RBC # BLD: 3.64 M/UL — LOW (ref 4.2–5.8)
RBC # FLD: 12.9 % — SIGNIFICANT CHANGE UP (ref 10.3–14.5)
SMOOTH MUSCLE AB SER-ACNC: SIGNIFICANT CHANGE UP
SODIUM SERPL-SCNC: 143 MMOL/L — SIGNIFICANT CHANGE UP (ref 135–145)
VZV IGM SER-ACNC: <0.91 INDEX — SIGNIFICANT CHANGE UP (ref 0–0.9)
WBC # BLD: 4.74 K/UL — SIGNIFICANT CHANGE UP (ref 3.8–10.5)
WBC # FLD AUTO: 4.74 K/UL — SIGNIFICANT CHANGE UP (ref 3.8–10.5)

## 2022-09-23 PROCEDURE — 99233 SBSQ HOSP IP/OBS HIGH 50: CPT

## 2022-09-23 RX ORDER — SODIUM CHLORIDE 9 MG/ML
1000 INJECTION INTRAMUSCULAR; INTRAVENOUS; SUBCUTANEOUS
Refills: 0 | Status: DISCONTINUED | OUTPATIENT
Start: 2022-09-23 | End: 2022-09-26

## 2022-09-23 RX ORDER — POTASSIUM PHOSPHATE, MONOBASIC POTASSIUM PHOSPHATE, DIBASIC 236; 224 MG/ML; MG/ML
30 INJECTION, SOLUTION INTRAVENOUS ONCE
Refills: 0 | Status: COMPLETED | OUTPATIENT
Start: 2022-09-23 | End: 2022-09-23

## 2022-09-23 RX ADMIN — ENOXAPARIN SODIUM 40 MILLIGRAM(S): 100 INJECTION SUBCUTANEOUS at 23:04

## 2022-09-23 RX ADMIN — Medication 100 MILLIGRAM(S): at 12:35

## 2022-09-23 RX ADMIN — Medication 1 TABLET(S): at 17:51

## 2022-09-23 RX ADMIN — Medication 1 TABLET(S): at 12:34

## 2022-09-23 RX ADMIN — SODIUM CHLORIDE 75 MILLILITER(S): 9 INJECTION INTRAMUSCULAR; INTRAVENOUS; SUBCUTANEOUS at 17:47

## 2022-09-23 RX ADMIN — CEFTRIAXONE 1000 MILLIGRAM(S): 500 INJECTION, POWDER, FOR SOLUTION INTRAMUSCULAR; INTRAVENOUS at 17:48

## 2022-09-23 RX ADMIN — SODIUM CHLORIDE 150 MILLILITER(S): 9 INJECTION, SOLUTION INTRAVENOUS at 05:15

## 2022-09-23 RX ADMIN — POTASSIUM PHOSPHATE, MONOBASIC POTASSIUM PHOSPHATE, DIBASIC 83.33 MILLIMOLE(S): 236; 224 INJECTION, SOLUTION INTRAVENOUS at 10:14

## 2022-09-23 RX ADMIN — CHLORHEXIDINE GLUCONATE 1 APPLICATION(S): 213 SOLUTION TOPICAL at 05:14

## 2022-09-23 RX ADMIN — Medication 1 MILLIGRAM(S): at 12:35

## 2022-09-23 RX ADMIN — Medication 1 MILLIGRAM(S): at 23:04

## 2022-09-23 RX ADMIN — Medication 1 TABLET(S): at 23:04

## 2022-09-23 RX ADMIN — Medication 1 TABLET(S): at 05:19

## 2022-09-23 NOTE — BH CONSULTATION LIAISON PROGRESS NOTE - NSBHASSESSMENTFT_PSY_ALL_CORE
Per detailed review the patient can have psychotic episodes comprised of auditory hallucinations and paranoia while concurrently exhibiting either manic or depressive symptomology.  They also said that he has displayed psychosis while not displaying any mood symptoms.  This presentation is more cogent to a Schizoaffective diagnosis than Bipolar 1 with psychotic features.  Currently without any psychosis noted or elicited.  No suicidal ideation or homicidal ideation.    Note:    -  Verified past meds include - Haldol 5 BID, Haldol 100 DEC, Fluoxitine 30, Zyprexa 10, Sertraline 75, /500  -  The patient would likely benefit from a long acting injectable given his history of non compliance.  Invega Sustenna is the only ANDERSON that is FDA approved for Schizoaffective disorder.  Its active antipsychotic, Paliperidone is the active metabolite of Risperdal, is not metabolized extensively in the liver and is primarily renally excreted.    DDx:  -  Schizophrenia  -  EtOH use disorder  -  R/O BP with psychotic features, Schizoaffective Disorder.    Plan:  -  Recommend constant 1:1 observation for suicide risk.    -  Recommend Ativan 1mg PO Q6 PRN for anxiety or agitation, 2mg IM for sever agitation  -  Recommend at this time avoiding first and second gen antipsychotics due to severely elevated LFTs and history of side effects (tongue swelling with Abilify)  -  Recommend patient be transferred to inpatient psychiatric care for safe medication titration once medially stable. Per detailed review the patient can have psychotic episodes comprised of auditory hallucinations and paranoia while concurrently exhibiting either manic or depressive symptomology.  He has displayed psychosis while not displaying any mood symptoms.  This presentation is more congruent to a Schizoaffective diagnosis than Bipolar 1 with psychotic features.  Currently without any psychosis noted or elicited.  No suicidal ideation or homicidal ideation.    Note:    -  Verified past meds include - Haldol 5 BID, Haldol 100 DEC, Fluoxitine 30, Zyprexa 10, Sertraline 75, /500  -  The patient would likely benefit from a long acting injectable given his history of non compliance.  Invega Sustenna is the only ANDERSON that is FDA approved for Schizoaffective disorder.  Its active antipsychotic, Paliperidone is the active metabolite of Risperdal, is not metabolized extensively in the liver and is primarily renally excreted.    DDx:  -  Schizophrenia  -  EtOH use disorder  -  R/O BP with psychotic features, Schizoaffective Disorder.    Plan:  -  Recommend constant 1:1 observation for suicide risk.    -  Recommend Ativan 1mg PO Q6 PRN for anxiety or agitation, 2mg IM for sever agitation  -  Recommend at this time avoiding first and second gen antipsychotics due to severely elevated LFTs and history of side effects (tongue swelling with Abilify)  -  Recommend patient be transferred to inpatient psychiatric care for safe medication titration once medically stable.

## 2022-09-23 NOTE — PROGRESS NOTE ADULT - ASSESSMENT
37 y.o. M with psych hx since age 13, h/o PTSD, in the past diagnosed with bipolar with psychotic features, with h/o multiple admissions (6) and 2-3 Suicidal attempts, (cutting b/l wrists and dorsal aspect of left arm and by overdose on aspirin),last hospitalized in [facility] on 8/23/22 for paranoia and suicidal ideation but was evaluated by psych and deemed able to be discharged home. Pt presented with AMS/ unresponsiveness. Per hx by family recent heavy alcohol intake and found unresponsive with multiple pill bottles near him concerning for overdose (tylenol/ zyprexa/gabapentin/ oxycodone). In the ER Noted with elevated acetaminophen level >600 range, AG metabolic acidosis. Pt was intubated for airway protection and admitted to the MICU for acute metabolic encephalopathy secondary to polysubstance ingestion and tylenol toxicity. Pt went into shock thought to be secondary to sedatives and required pressor support. S/p activated charcoal and continued on NAC as well as fomepizole, nephro consulted and pt underwent emergent hemodialysis on 9/20. Pt was able to be extubated and weaned off of pressor support. Shiley cath was removed. Although tylenol level is down trended pts liver enzymes increased significantly. Pt deemed appropriate to be downgraded from the MICU to the hospitalist service.     Acute metabolic encephalopathy secondary to poly substance abuse   underlying hx Bipolar DO (severe depression)/ suicidal ideation/ attempts and PTSD   s/p intubation for airway protection on 9/20 and extubated 9/21   s/p pressor support secondary to shock from sedatives   U tox positive for THZ, lithium and salicylate negative   c/w treatment for tylenol toxicity   Hold zyprexa for now   Hold all home Psych meds for now   c/w constant observation 1:1 for suicidal ideation   Psych consult appreciated, started on Ativan PRN      Tylenol toxicity   Tylenol level currently wnl   AST/ALT trending down   INR now trending down   c/w NAC infusion, 25 mg/kg/hr indefinitely until tylenol level is undetectable and the LFTs are at least below 50% of the peak   Can likely DC NAC in AM per LFTs   s/p Fomepizole in MICU   Trend LFTs/ tylenol/ coags and CMP  Hold hepatotoxic agents    Acute liver failure  Hepatology consult appreciated   Trend LFTs  Work up sent per Hepatology   CT A/P triphasic reviewed: no acute liver pathology     Rhabdomyolysis  likely secondary to unclear down time   trending down   Decrease IVF rate   nephro f/u noted and appreciated     Aspiration pna   Leukocytosis improving   CT Chest: Consolidation of the lingula and patchy opacities within left upper lobe likely representing pneumonia  c/w Rocephin   BCx with NGTD    UA negative - f/u U cx     Electrolyte disturbances   Hypokalemia and Hypophosphatemia   Replace IV and PO   Monitor      Alcohol abuse  Alcohol level <10   c/w CIWA protocol   for now will remain off of librium - unknown last drink and pt s/p sedative weaning   c/w thiamine, folic acid and MVI      Hyperglycemia   Hba1c 5.6   Improving     Hypertension   likely from anxiety/ agitation   c/w monitoring BP      DVT PPx - c/w Lovenox SQ      Activity level: Increase as tolerated    Dispo: remains active

## 2022-09-23 NOTE — PROGRESS NOTE ADULT - ASSESSMENT
The patient is a 37 year old male with psych history since age of 13 which includes bipolar disorder with psychotic features, post traumatic stress disorder, prior history of suicidal ideation, with history of multiple inpatient psych admissions, whom (per documentation) was found unresponsive in his apartment, covered in black emesis. He was also found surrounded by bottles containing acetaminophen, valproate, olanzapine, oxycodone, gabapentin, and other OTCs. Per documentation, family stated that the patient has a history of overdosing on pills. He was emergently intubated in the ED due to obtundation/airway protection. Admitted for acetaminophen toxicity (acetaminophen < 600). Hospital course notable for elevated osmolar gap concerning for coingestion, s/p emergent hemodialysis on 9/20.     -On admission, osmolar gap of ~30  -s/p emergent hemodialysis on 9/20   -Renal function remains at baseline at this time; Remains non oliguric   - Anion gap closed  -Rhabdomyolysis  improving (CPK 4120+) - continue hydration with NS- decrease rate to 100 ml/hr, pt with worsening UE edema  -continue to trend CPK daily; encourage po hydration  -HypoK and hypo phos- replete lytes   -Acetaminophen toxicity - improving - latest acetaminophen level is 3.4 (from > 600 on admission) - continue medical management as per primary team

## 2022-09-24 LAB
ALBUMIN SERPL ELPH-MCNC: 3.3 G/DL — SIGNIFICANT CHANGE UP (ref 3.3–5.2)
ALP SERPL-CCNC: 70 U/L — SIGNIFICANT CHANGE UP (ref 40–120)
ALT FLD-CCNC: 1287 U/L — HIGH
ANA TITR SER: NEGATIVE — SIGNIFICANT CHANGE UP
ANION GAP SERPL CALC-SCNC: 14 MMOL/L — SIGNIFICANT CHANGE UP (ref 5–17)
AST SERPL-CCNC: 211 U/L — HIGH
BASOPHILS # BLD AUTO: 0.02 K/UL — SIGNIFICANT CHANGE UP (ref 0–0.2)
BASOPHILS NFR BLD AUTO: 0.5 % — SIGNIFICANT CHANGE UP (ref 0–2)
BILIRUB DIRECT SERPL-MCNC: 0.3 MG/DL — SIGNIFICANT CHANGE UP (ref 0–0.3)
BILIRUB INDIRECT FLD-MCNC: 0.7 MG/DL — SIGNIFICANT CHANGE UP (ref 0.2–1)
BILIRUB SERPL-MCNC: 1 MG/DL — SIGNIFICANT CHANGE UP (ref 0.4–2)
BUN SERPL-MCNC: 7.6 MG/DL — LOW (ref 8–20)
CALCIUM SERPL-MCNC: 8.6 MG/DL — SIGNIFICANT CHANGE UP (ref 8.4–10.5)
CHLORIDE SERPL-SCNC: 105 MMOL/L — SIGNIFICANT CHANGE UP (ref 98–107)
CK MB CFR SERPL CALC: 3.8 NG/ML — SIGNIFICANT CHANGE UP (ref 0–6.7)
CK SERPL-CCNC: 1679 U/L — HIGH (ref 30–200)
CO2 SERPL-SCNC: 24 MMOL/L — SIGNIFICANT CHANGE UP (ref 22–29)
CREAT SERPL-MCNC: 0.51 MG/DL — SIGNIFICANT CHANGE UP (ref 0.5–1.3)
EGFR: 134 ML/MIN/1.73M2 — SIGNIFICANT CHANGE UP
EOSINOPHIL # BLD AUTO: 0.17 K/UL — SIGNIFICANT CHANGE UP (ref 0–0.5)
EOSINOPHIL NFR BLD AUTO: 4.4 % — SIGNIFICANT CHANGE UP (ref 0–6)
GLUCOSE SERPL-MCNC: 91 MG/DL — SIGNIFICANT CHANGE UP (ref 70–99)
HCT VFR BLD CALC: 32.5 % — LOW (ref 39–50)
HGB BLD-MCNC: 11.3 G/DL — LOW (ref 13–17)
IMM GRANULOCYTES NFR BLD AUTO: 0.3 % — SIGNIFICANT CHANGE UP (ref 0–0.9)
INR BLD: 1.22 RATIO — HIGH (ref 0.88–1.16)
LYMPHOCYTES # BLD AUTO: 1.16 K/UL — SIGNIFICANT CHANGE UP (ref 1–3.3)
LYMPHOCYTES # BLD AUTO: 30.1 % — SIGNIFICANT CHANGE UP (ref 13–44)
MCHC RBC-ENTMCNC: 28.8 PG — SIGNIFICANT CHANGE UP (ref 27–34)
MCHC RBC-ENTMCNC: 34.8 GM/DL — SIGNIFICANT CHANGE UP (ref 32–36)
MCV RBC AUTO: 82.9 FL — SIGNIFICANT CHANGE UP (ref 80–100)
MONOCYTES # BLD AUTO: 0.25 K/UL — SIGNIFICANT CHANGE UP (ref 0–0.9)
MONOCYTES NFR BLD AUTO: 6.5 % — SIGNIFICANT CHANGE UP (ref 2–14)
NEUTROPHILS # BLD AUTO: 2.25 K/UL — SIGNIFICANT CHANGE UP (ref 1.8–7.4)
NEUTROPHILS NFR BLD AUTO: 58.2 % — SIGNIFICANT CHANGE UP (ref 43–77)
PLATELET # BLD AUTO: 117 K/UL — LOW (ref 150–400)
POTASSIUM SERPL-MCNC: 3 MMOL/L — LOW (ref 3.5–5.3)
POTASSIUM SERPL-SCNC: 3 MMOL/L — LOW (ref 3.5–5.3)
PROT SERPL-MCNC: 5.7 G/DL — LOW (ref 6.6–8.7)
PROTHROM AB SERPL-ACNC: 14.2 SEC — HIGH (ref 10.5–13.4)
RBC # BLD: 3.92 M/UL — LOW (ref 4.2–5.8)
RBC # FLD: 13 % — SIGNIFICANT CHANGE UP (ref 10.3–14.5)
SODIUM SERPL-SCNC: 142 MMOL/L — SIGNIFICANT CHANGE UP (ref 135–145)
WBC # BLD: 3.86 K/UL — SIGNIFICANT CHANGE UP (ref 3.8–10.5)
WBC # FLD AUTO: 3.86 K/UL — SIGNIFICANT CHANGE UP (ref 3.8–10.5)

## 2022-09-24 PROCEDURE — 99233 SBSQ HOSP IP/OBS HIGH 50: CPT

## 2022-09-24 PROCEDURE — 99232 SBSQ HOSP IP/OBS MODERATE 35: CPT

## 2022-09-24 RX ORDER — POTASSIUM CHLORIDE 20 MEQ
40 PACKET (EA) ORAL ONCE
Refills: 0 | Status: COMPLETED | OUTPATIENT
Start: 2022-09-24 | End: 2022-09-24

## 2022-09-24 RX ADMIN — Medication 1 TABLET(S): at 11:35

## 2022-09-24 RX ADMIN — ENOXAPARIN SODIUM 40 MILLIGRAM(S): 100 INJECTION SUBCUTANEOUS at 21:21

## 2022-09-24 RX ADMIN — Medication 1 MILLIGRAM(S): at 23:49

## 2022-09-24 RX ADMIN — Medication 1 TABLET(S): at 21:20

## 2022-09-24 RX ADMIN — SODIUM CHLORIDE 75 MILLILITER(S): 9 INJECTION INTRAMUSCULAR; INTRAVENOUS; SUBCUTANEOUS at 03:57

## 2022-09-24 RX ADMIN — Medication 40 MILLIEQUIVALENT(S): at 11:33

## 2022-09-24 RX ADMIN — Medication 1 MILLIGRAM(S): at 11:34

## 2022-09-24 RX ADMIN — Medication 100 MILLIGRAM(S): at 11:34

## 2022-09-24 RX ADMIN — Medication 1 TABLET(S): at 17:19

## 2022-09-24 RX ADMIN — CEFTRIAXONE 1000 MILLIGRAM(S): 500 INJECTION, POWDER, FOR SOLUTION INTRAMUSCULAR; INTRAVENOUS at 17:19

## 2022-09-24 RX ADMIN — Medication 1 TABLET(S): at 11:34

## 2022-09-24 RX ADMIN — Medication 1 MILLIGRAM(S): at 07:19

## 2022-09-24 NOTE — PROGRESS NOTE ADULT - ASSESSMENT
37 y.o. M with psych hx since age 13, h/o PTSD, in the past diagnosed with bipolar with psychotic features, with h/o multiple admissions (6) and 2-3 Suicidal attempts, (cutting b/l wrists and dorsal aspect of left arm and by overdose on aspirin),last hospitalized in [facility] on 8/23/22 for paranoia and suicidal ideation but was evaluated by psych and deemed able to be discharged home. Pt presented with AMS/ unresponsiveness. Per hx by family recent heavy alcohol intake and found unresponsive with multiple pill bottles near him concerning for overdose (tylenol/ zyprexa/gabapentin/ oxycodone). In the ER Noted with elevated acetaminophen level >600 range, AG metabolic acidosis. Pt was intubated for airway protection and admitted to the MICU for acute metabolic encephalopathy secondary to polysubstance ingestion and tylenol toxicity. Pt went into shock thought to be secondary to sedatives and required pressor support. S/p activated charcoal and continued on NAC as well as fomepizole, nephro consulted and pt underwent emergent hemodialysis on 9/20. Pt was able to be extubated and weaned off of pressor support. Shiley cath was removed. Although tylenol level is down trended pts liver enzymes increased significantly. Pt deemed appropriate to be downgraded from the MICU to the hospitalist service.     Acute metabolic encephalopathy secondary to poly substance abuse   underlying hx Bipolar DO (severe depression)/ suicidal ideation/ attempts and PTSD   s/p intubation for airway protection on 9/20 and extubated 9/21   s/p pressor support secondary to shock from sedatives   U tox positive for THZ, lithium and salicylate negative   c/w treatment for tylenol toxicity   Hold zyprexa for now   Hold all home Psych meds for now   c/w constant observation 1:1 for suicidal ideation   Psych consult appreciated, started on Ativan PRN      Tylenol toxicity   Tylenol level currently wnl   AST/ALT trending down   INR now trending down   Discussed with toxicology- discontinued NAC protocol given LFTs downtrending.   Trend LFTs/ tylenol/ coags and CMP  Hold hepatotoxic agents    Acute liver failure  Hepatology consult appreciated   Trend LFTs  Work up sent per Hepatology   CT A/P triphasic reviewed: no acute liver pathology     Rhabdomyolysis  likely secondary to unclear down time   trending down   Decrease IVF rate   nephro f/u noted and appreciated     Aspiration pna   Leukocytosis improving   CT Chest: Consolidation of the lingula and patchy opacities within left upper lobe likely representing pneumonia  c/w Rocephin   BCx with NGTD    UA negative - f/u U cx     Electrolyte disturbances   Hypokalemia and Hypophosphatemia   Replace IV and PO   Monitor      Alcohol abuse  Alcohol level <10   c/w CIWA protocol   for now will remain off of librium - unknown last drink and pt s/p sedative weaning   c/w thiamine, folic acid and MVI      Hyperglycemia   Hba1c 5.6   Improving     Hypertension   likely from anxiety/ agitation   c/w monitoring BP      DVT PPx - c/w Lovenox SQ      Activity level: Increase as tolerated    Dispo: Plan for dispo to inpatient psych rehab.

## 2022-09-24 NOTE — CHART NOTE - NSCHARTNOTEFT_GEN_A_CORE
Discussed with Toxicology.    Will recheck tylenol level, if below detectable then no need for additional fomepizole.  Discussed with hospitalist as well.   Continue NAC.
Nutrition Note: Aware pt downgraded from ICU. Chart reviewed; RD to follow up with full nutrition assessment per medical floor protocol.
-Patient LFTs trends are now at 50% off peak values.   -Can discontinue NAC.   -Will need psyche input for further disposition.     Thank you for the consult.

## 2022-09-24 NOTE — PROGRESS NOTE ADULT - ASSESSMENT
The patient is a 37 year old male with psych history since age of 13 which includes bipolar disorder with psychotic features, post traumatic stress disorder, prior history of suicidal ideation, with history of multiple inpatient psych admissions, whom (per documentation) was found unresponsive in his apartment, covered in black emesis. He was also found surrounded by bottles containing acetaminophen, valproate, olanzapine, oxycodone, gabapentin, and other OTCs. Per documentation, family stated that the patient has a history of overdosing on pills. He was emergently intubated in the ED due to obtundation/airway protection. Admitted for acetaminophen toxicity (acetaminophen < 600). Hospital course notable for elevated osmolar gap concerning for coingestion, s/p emergent hemodialysis on 9/20.     -On admission, osmolar gap of ~30  -s/p emergent hemodialysis on 9/20   -Renal function remains at baseline at this time; Remains non oliguric   -Anion gap closed  -Rhabdomyolysis and transaminases improving  -HypoK and hypo phos- replete lytes   -Acetaminophen toxicity - improving - latest acetaminophen level is 3.4 (from > 600 on admission) - continue medical management as per primary team/toxicology team    Nephrology will sign off, thanks for the consult.

## 2022-09-25 LAB
ALBUMIN SERPL ELPH-MCNC: 3.4 G/DL — SIGNIFICANT CHANGE UP (ref 3.3–5.2)
ALP SERPL-CCNC: 74 U/L — SIGNIFICANT CHANGE UP (ref 40–120)
ALT FLD-CCNC: 1034 U/L — HIGH
ANION GAP SERPL CALC-SCNC: 10 MMOL/L — SIGNIFICANT CHANGE UP (ref 5–17)
APAP SERPL-MCNC: <3 UG/ML — LOW (ref 10–26)
AST SERPL-CCNC: 207 U/L — HIGH
BILIRUB SERPL-MCNC: 0.7 MG/DL — SIGNIFICANT CHANGE UP (ref 0.4–2)
BUN SERPL-MCNC: 9.8 MG/DL — SIGNIFICANT CHANGE UP (ref 8–20)
CALCIUM SERPL-MCNC: 8.4 MG/DL — SIGNIFICANT CHANGE UP (ref 8.4–10.5)
CHLORIDE SERPL-SCNC: 107 MMOL/L — SIGNIFICANT CHANGE UP (ref 98–107)
CO2 SERPL-SCNC: 24 MMOL/L — SIGNIFICANT CHANGE UP (ref 22–29)
CREAT SERPL-MCNC: 0.56 MG/DL — SIGNIFICANT CHANGE UP (ref 0.5–1.3)
CULTURE RESULTS: SIGNIFICANT CHANGE UP
CULTURE RESULTS: SIGNIFICANT CHANGE UP
EGFR: 130 ML/MIN/1.73M2 — SIGNIFICANT CHANGE UP
GLUCOSE SERPL-MCNC: 112 MG/DL — HIGH (ref 70–99)
HCT VFR BLD CALC: 30.9 % — LOW (ref 39–50)
HGB BLD-MCNC: 10.8 G/DL — LOW (ref 13–17)
MCHC RBC-ENTMCNC: 29.3 PG — SIGNIFICANT CHANGE UP (ref 27–34)
MCHC RBC-ENTMCNC: 35 GM/DL — SIGNIFICANT CHANGE UP (ref 32–36)
MCV RBC AUTO: 83.7 FL — SIGNIFICANT CHANGE UP (ref 80–100)
PLATELET # BLD AUTO: 142 K/UL — LOW (ref 150–400)
POTASSIUM SERPL-MCNC: 3.3 MMOL/L — LOW (ref 3.5–5.3)
POTASSIUM SERPL-SCNC: 3.3 MMOL/L — LOW (ref 3.5–5.3)
PROT SERPL-MCNC: 5.7 G/DL — LOW (ref 6.6–8.7)
RBC # BLD: 3.69 M/UL — LOW (ref 4.2–5.8)
RBC # FLD: 12.8 % — SIGNIFICANT CHANGE UP (ref 10.3–14.5)
SODIUM SERPL-SCNC: 141 MMOL/L — SIGNIFICANT CHANGE UP (ref 135–145)
SPECIMEN SOURCE: SIGNIFICANT CHANGE UP
SPECIMEN SOURCE: SIGNIFICANT CHANGE UP
WBC # BLD: 3.59 K/UL — LOW (ref 3.8–10.5)
WBC # FLD AUTO: 3.59 K/UL — LOW (ref 3.8–10.5)

## 2022-09-25 PROCEDURE — 99232 SBSQ HOSP IP/OBS MODERATE 35: CPT

## 2022-09-25 RX ADMIN — Medication 1 TABLET(S): at 06:21

## 2022-09-25 RX ADMIN — Medication 1 MILLIGRAM(S): at 11:33

## 2022-09-25 RX ADMIN — SODIUM CHLORIDE 75 MILLILITER(S): 9 INJECTION INTRAMUSCULAR; INTRAVENOUS; SUBCUTANEOUS at 09:53

## 2022-09-25 RX ADMIN — CEFTRIAXONE 1000 MILLIGRAM(S): 500 INJECTION, POWDER, FOR SOLUTION INTRAMUSCULAR; INTRAVENOUS at 17:09

## 2022-09-25 RX ADMIN — Medication 100 MILLIGRAM(S): at 11:33

## 2022-09-25 RX ADMIN — CHLORHEXIDINE GLUCONATE 1 APPLICATION(S): 213 SOLUTION TOPICAL at 06:21

## 2022-09-25 RX ADMIN — ENOXAPARIN SODIUM 40 MILLIGRAM(S): 100 INJECTION SUBCUTANEOUS at 22:29

## 2022-09-25 RX ADMIN — Medication 1 TABLET(S): at 11:32

## 2022-09-25 RX ADMIN — SODIUM CHLORIDE 75 MILLILITER(S): 9 INJECTION INTRAMUSCULAR; INTRAVENOUS; SUBCUTANEOUS at 14:32

## 2022-09-25 RX ADMIN — Medication 1 TABLET(S): at 11:33

## 2022-09-26 LAB
ALBUMIN SERPL ELPH-MCNC: 3.2 G/DL — LOW (ref 3.3–5.2)
ALP SERPL-CCNC: 89 U/L — SIGNIFICANT CHANGE UP (ref 40–120)
ALT FLD-CCNC: 719 U/L — HIGH
ANION GAP SERPL CALC-SCNC: 11 MMOL/L — SIGNIFICANT CHANGE UP (ref 5–17)
AST SERPL-CCNC: 106 U/L — HIGH
BILIRUB SERPL-MCNC: 0.4 MG/DL — SIGNIFICANT CHANGE UP (ref 0.4–2)
BUN SERPL-MCNC: 11.4 MG/DL — SIGNIFICANT CHANGE UP (ref 8–20)
CALCIUM SERPL-MCNC: 8.6 MG/DL — SIGNIFICANT CHANGE UP (ref 8.4–10.5)
CHLORIDE SERPL-SCNC: 105 MMOL/L — SIGNIFICANT CHANGE UP (ref 98–107)
CO2 SERPL-SCNC: 24 MMOL/L — SIGNIFICANT CHANGE UP (ref 22–29)
CREAT SERPL-MCNC: 0.61 MG/DL — SIGNIFICANT CHANGE UP (ref 0.5–1.3)
EGFR: 127 ML/MIN/1.73M2 — SIGNIFICANT CHANGE UP
GLUCOSE SERPL-MCNC: 106 MG/DL — HIGH (ref 70–99)
HCT VFR BLD CALC: 28.9 % — LOW (ref 39–50)
HGB BLD-MCNC: 9.8 G/DL — LOW (ref 13–17)
MCHC RBC-ENTMCNC: 28.6 PG — SIGNIFICANT CHANGE UP (ref 27–34)
MCHC RBC-ENTMCNC: 33.9 GM/DL — SIGNIFICANT CHANGE UP (ref 32–36)
MCV RBC AUTO: 84.3 FL — SIGNIFICANT CHANGE UP (ref 80–100)
MYOGLOBIN UR-MCNC: 11 NG/ML — SIGNIFICANT CHANGE UP (ref 0–13)
PLATELET # BLD AUTO: 156 K/UL — SIGNIFICANT CHANGE UP (ref 150–400)
POTASSIUM SERPL-MCNC: 3.3 MMOL/L — LOW (ref 3.5–5.3)
POTASSIUM SERPL-SCNC: 3.3 MMOL/L — LOW (ref 3.5–5.3)
PROT SERPL-MCNC: 5.4 G/DL — LOW (ref 6.6–8.7)
RBC # BLD: 3.43 M/UL — LOW (ref 4.2–5.8)
RBC # FLD: 12.8 % — SIGNIFICANT CHANGE UP (ref 10.3–14.5)
SARS-COV-2 RNA SPEC QL NAA+PROBE: SIGNIFICANT CHANGE UP
SODIUM SERPL-SCNC: 140 MMOL/L — SIGNIFICANT CHANGE UP (ref 135–145)
VZV DNA, PCR RESULT: NEGATIVE — SIGNIFICANT CHANGE UP
WBC # BLD: 6.48 K/UL — SIGNIFICANT CHANGE UP (ref 3.8–10.5)
WBC # FLD AUTO: 6.48 K/UL — SIGNIFICANT CHANGE UP (ref 3.8–10.5)

## 2022-09-26 PROCEDURE — 99233 SBSQ HOSP IP/OBS HIGH 50: CPT

## 2022-09-26 RX ORDER — PALIPERIDONE 1.5 MG/1
3 TABLET, EXTENDED RELEASE ORAL DAILY
Refills: 0 | Status: DISCONTINUED | OUTPATIENT
Start: 2022-09-26 | End: 2022-09-28

## 2022-09-26 RX ORDER — POTASSIUM CHLORIDE 20 MEQ
40 PACKET (EA) ORAL DAILY
Refills: 0 | Status: DISCONTINUED | OUTPATIENT
Start: 2022-09-27 | End: 2022-09-28

## 2022-09-26 RX ORDER — POTASSIUM CHLORIDE 20 MEQ
40 PACKET (EA) ORAL ONCE
Refills: 0 | Status: COMPLETED | OUTPATIENT
Start: 2022-09-26 | End: 2022-09-26

## 2022-09-26 RX ADMIN — Medication 100 MILLIGRAM(S): at 12:12

## 2022-09-26 RX ADMIN — SODIUM CHLORIDE 75 MILLILITER(S): 9 INJECTION INTRAMUSCULAR; INTRAVENOUS; SUBCUTANEOUS at 12:12

## 2022-09-26 RX ADMIN — Medication 1 TABLET(S): at 12:12

## 2022-09-26 RX ADMIN — ENOXAPARIN SODIUM 40 MILLIGRAM(S): 100 INJECTION SUBCUTANEOUS at 22:26

## 2022-09-26 RX ADMIN — Medication 40 MILLIEQUIVALENT(S): at 09:02

## 2022-09-26 RX ADMIN — PALIPERIDONE 3 MILLIGRAM(S): 1.5 TABLET, EXTENDED RELEASE ORAL at 12:27

## 2022-09-26 RX ADMIN — Medication 1 MILLIGRAM(S): at 12:12

## 2022-09-26 RX ADMIN — CHLORHEXIDINE GLUCONATE 1 APPLICATION(S): 213 SOLUTION TOPICAL at 06:10

## 2022-09-26 NOTE — BH CONSULTATION LIAISON PROGRESS NOTE - NSBHASSESSMENTFT_PSY_ALL_CORE
Overall the patient continues to not display any overt psychotic symptoms like delusions, hallucinations, or grossly disorganized speech or behaviour.  Appears a little blunted and has been noted to be pacing / walking the halls.  Continues without any suicidal thoughts.  Is remorseful about being here in the hospital and the stress this has caused his family.  Open to trialing paliperidone as well as possible inpatient psychiatric hospitalization.      DDx:  -  Schizophrenia  -  EtOH use disorder  -  R/O BP with psychotic features, Schizoaffective Disorder.    Plan:  -  Start Paliperidone 3mg PO daily for history of psychosis  -  Plan to increase to 6mg in 3 days (9/29/22)  -  Recommend constant 1:1 observation for suicide risk.    -  Recommend Ativan 1mg PO Q6 PRN for anxiety or agitation, 2mg IM for sever agitation  -  Would benefit from inpatient psych admission for medication titration however current labs, though down trending likely make patient eligible for admit at this time.   Overall the patient continues to not display any overt psychotic symptoms like delusions, hallucinations, or grossly disorganized speech or behaviour.  Appears a little blunted and has been noted to be pacing / walking the halls.  Continues without any suicidal thoughts.  Is remorseful about being here in the hospital and the stress this has caused his family.  Open to trialing paliperidone as well as possible inpatient psychiatric hospitalization.      DDx:  -  Schizophrenia  -  EtOH use disorder  -  R/O BP with psychotic features, Schizoaffective Disorder.    Plan:  -  Start Paliperidone 3mg PO daily for history of psychosis  -  Plan to increase to 6mg in 3 days (9/29/22)  -  Recommend constant 1:1 observation for suicide risk.    -  Recommend Ativan 1mg PO Q6 PRN for anxiety or agitation, 2mg IM for sever agitation  -  Would benefit from inpatient psych admission for medication titration however current labs, though down trending likely make patient ineligible for admit at this time.

## 2022-09-26 NOTE — PROGRESS NOTE ADULT - ASSESSMENT
37 y.o. M with psych hx since age 13, h/o PTSD, in the past diagnosed with bipolar with psychotic features, with h/o multiple admissions (6) and 2-3 Suicidal attempts, (cutting b/l wrists and dorsal aspect of left arm and by overdose on aspirin),last hospitalized in [facility] on 8/23/22 for paranoia and suicidal ideation but was evaluated by psych and deemed able to be discharged home. Pt presented with AMS/ unresponsiveness. Per hx by family recent heavy alcohol intake and found unresponsive with multiple pill bottles near him concerning for overdose (tylenol/ zyprexa/gabapentin/ oxycodone). In the ER Noted with elevated acetaminophen level >600 range, AG metabolic acidosis. Pt was intubated for airway protection and admitted to the MICU for acute metabolic encephalopathy secondary to polysubstance ingestion and tylenol toxicity. Pt went into shock thought to be secondary to sedatives and required pressor support. S/p activated charcoal and continued on NAC as well as fomepizole, nephro consulted and pt underwent emergent hemodialysis on 9/20. Pt was able to be extubated and weaned off of pressor support. Shiley cath was removed. Although tylenol level is down trended pts liver enzymes increased significantly. Pt deemed appropriate to be downgraded from the MICU to the hospitalist service.     Acute metabolic encephalopathy secondary to poly substance abuse   underlying hx Bipolar DO (severe depression)/ suicidal ideation/ attempts and PTSD   s/p intubation for airway protection on 9/20 and extubated 9/21   s/p pressor support secondary to shock from sedatives   U tox positive for THZ, lithium and salicylate negative   Hold zyprexa for now   Hold all home Psych meds for now   c/w constant observation 1:1 for suicidal ideation   Psych consult appreciated, started on Ativan PRN   - Appreciate psych recs- started Invega     Tylenol toxicity   Tylenol level currently wnl   AST/ALT trending down   INR now trending down   Discussed with toxicology- S/p NAC protocol given LFTs downtrending.   Trend LFTs/ tylenol/coags and CMP  Hold hepatotoxic agents    Acute liver failure  Hepatology consult appreciated   Trend LFTs  Work up sent per Hepatology   CT A/P triphasic reviewed: no acute liver pathology     Rhabdomyolysis- downtrending  likely secondary to unclear down time   trending down   nephro f/u noted and appreciated     Hypokalemia  - started potassium chloride 40 mg QD    Aspiration pna  Leukocytosis improving   CT Chest: Consolidation of the lingula and patchy opacities within left upper lobe likely representing pneumonia  S/p Rocephin   BCx with NGTD    UA negative - f/u U cx     Alcohol abuse  Alcohol level <10   Completed ciwa   unknown last drink and pt s/p sedative weaning   c/w thiamine, folic acid and MVI      Hyperglycemia   Hba1c 5.6   Improving     Hypertension   likely from anxiety/ agitation   c/w monitoring BP      DVT PPx - c/w Lovenox SQ      Activity level: Increase as tolerated    Dispo: Pending dispo to inpatient psych rehab. Patient will need ALT close to baseline to be accepted by rehab

## 2022-09-27 LAB
ALBUMIN SERPL ELPH-MCNC: 3.3 G/DL — SIGNIFICANT CHANGE UP (ref 3.3–5.2)
ALP SERPL-CCNC: 67 U/L — SIGNIFICANT CHANGE UP (ref 40–120)
ALT FLD-CCNC: 490 U/L — HIGH
ANION GAP SERPL CALC-SCNC: 7 MMOL/L — SIGNIFICANT CHANGE UP (ref 5–17)
AST SERPL-CCNC: 51 U/L — HIGH
BILIRUB SERPL-MCNC: 0.4 MG/DL — SIGNIFICANT CHANGE UP (ref 0.4–2)
BUN SERPL-MCNC: 10.5 MG/DL — SIGNIFICANT CHANGE UP (ref 8–20)
CALCIUM SERPL-MCNC: 8.5 MG/DL — SIGNIFICANT CHANGE UP (ref 8.4–10.5)
CHLORIDE SERPL-SCNC: 106 MMOL/L — SIGNIFICANT CHANGE UP (ref 98–107)
CK MB CFR SERPL CALC: 1.4 NG/ML — SIGNIFICANT CHANGE UP (ref 0–6.7)
CK SERPL-CCNC: 231 U/L — HIGH (ref 30–200)
CO2 SERPL-SCNC: 28 MMOL/L — SIGNIFICANT CHANGE UP (ref 22–29)
CREAT SERPL-MCNC: 0.62 MG/DL — SIGNIFICANT CHANGE UP (ref 0.5–1.3)
EGFR: 126 ML/MIN/1.73M2 — SIGNIFICANT CHANGE UP
GLUCOSE SERPL-MCNC: 102 MG/DL — HIGH (ref 70–99)
HCT VFR BLD CALC: 30.1 % — LOW (ref 39–50)
HEV IGM SER QL: SIGNIFICANT CHANGE UP
HGB BLD-MCNC: 10.2 G/DL — LOW (ref 13–17)
MCHC RBC-ENTMCNC: 28.6 PG — SIGNIFICANT CHANGE UP (ref 27–34)
MCHC RBC-ENTMCNC: 33.9 GM/DL — SIGNIFICANT CHANGE UP (ref 32–36)
MCV RBC AUTO: 84.3 FL — SIGNIFICANT CHANGE UP (ref 80–100)
PLATELET # BLD AUTO: 201 K/UL — SIGNIFICANT CHANGE UP (ref 150–400)
POTASSIUM SERPL-MCNC: 3.8 MMOL/L — SIGNIFICANT CHANGE UP (ref 3.5–5.3)
POTASSIUM SERPL-SCNC: 3.8 MMOL/L — SIGNIFICANT CHANGE UP (ref 3.5–5.3)
PROT SERPL-MCNC: 5.3 G/DL — LOW (ref 6.6–8.7)
RBC # BLD: 3.57 M/UL — LOW (ref 4.2–5.8)
RBC # FLD: 12.7 % — SIGNIFICANT CHANGE UP (ref 10.3–14.5)
SODIUM SERPL-SCNC: 141 MMOL/L — SIGNIFICANT CHANGE UP (ref 135–145)
SOLUBLE LIVER IGG SER IA-ACNC: 1.2 — SIGNIFICANT CHANGE UP (ref 0–20)
WBC # BLD: 7.42 K/UL — SIGNIFICANT CHANGE UP (ref 3.8–10.5)
WBC # FLD AUTO: 7.42 K/UL — SIGNIFICANT CHANGE UP (ref 3.8–10.5)

## 2022-09-27 PROCEDURE — 99233 SBSQ HOSP IP/OBS HIGH 50: CPT

## 2022-09-27 PROCEDURE — 99232 SBSQ HOSP IP/OBS MODERATE 35: CPT

## 2022-09-27 RX ORDER — LANOLIN ALCOHOL/MO/W.PET/CERES
3 CREAM (GRAM) TOPICAL ONCE
Refills: 0 | Status: COMPLETED | OUTPATIENT
Start: 2022-09-27 | End: 2022-09-27

## 2022-09-27 RX ADMIN — Medication 3 MILLIGRAM(S): at 23:36

## 2022-09-27 RX ADMIN — Medication 100 MILLIGRAM(S): at 12:49

## 2022-09-27 RX ADMIN — CHLORHEXIDINE GLUCONATE 1 APPLICATION(S): 213 SOLUTION TOPICAL at 06:37

## 2022-09-27 RX ADMIN — Medication 1 TABLET(S): at 12:48

## 2022-09-27 RX ADMIN — Medication 40 MILLIEQUIVALENT(S): at 12:49

## 2022-09-27 RX ADMIN — PALIPERIDONE 3 MILLIGRAM(S): 1.5 TABLET, EXTENDED RELEASE ORAL at 12:49

## 2022-09-27 RX ADMIN — ENOXAPARIN SODIUM 40 MILLIGRAM(S): 100 INJECTION SUBCUTANEOUS at 22:10

## 2022-09-27 RX ADMIN — Medication 1 MILLIGRAM(S): at 12:49

## 2022-09-27 NOTE — DIETITIAN INITIAL EVALUATION ADULT - PERTINENT LABORATORY DATA
09-27    141  |  106  |  10.5  ----------------------------<  102<H>  3.8   |  28.0  |  0.62    Ca    8.5      27 Sep 2022 04:52    TPro  5.3<L>  /  Alb  3.3  /  TBili  0.4  /  DBili  x   /  AST  51<H>  /  ALT  490<H>  /  AlkPhos  67  09-27  A1C with Estimated Average Glucose Result: 5.6 % (09-22-22 @ 06:20)

## 2022-09-27 NOTE — PROGRESS NOTE ADULT - SUBJECTIVE AND OBJECTIVE BOX
[facility] Division of Hospital Medicine    Chief Complaint:  Acetaminophen toxicity    SUBJECTIVE / OVERNIGHT EVENTS:  Pt says he feels OK   Very upset after discussing his liver failure and asking 'if he is going ot die' and if 'someone can save him'     Patient denies chest pain, SOB, abd pain, N/V, fever, chills, dysuria or any other complaints. All remainder ROS negative.     MEDICATIONS  (STANDING):  acetylcysteine 30 Gram(s),Dextrose 5% in Water 1000 milliLiter(s) 30 Gram(s) (100 mL/Hr) IV Continuous <Continuous>  cefTRIAXone Injectable. 1000 milliGRAM(s) IV Push every 24 hours  chlorhexidine 2% Cloths 1 Application(s) Topical <User Schedule>  enoxaparin Injectable 40 milliGRAM(s) SubCutaneous every 24 hours  folic acid 1 milliGRAM(s) Oral daily  lactated ringers. 1000 milliLiter(s) (150 mL/Hr) IV Continuous <Continuous>  multivitamin 1 Tablet(s) Oral daily  potassium phosphate / sodium phosphate Tablet (K-PHOS No. 2) 1 Tablet(s) Oral four times a day  potassium phosphate IVPB 30 milliMole(s) IV Intermittent once  thiamine 100 milliGRAM(s) Oral daily    MEDICATIONS  (PRN):  sodium chloride 0.9% lock flush 10 milliLiter(s) IV Push every 1 hour PRN Pre/post blood products, medications, blood draw, and to maintain line patency        I&O's Summary    21 Sep 2022 07:  -  22 Sep 2022 07:00  --------------------------------------------------------  IN: 3525 mL / OUT: 150 mL / NET: 3375 mL    22 Sep 2022 07:  -  22 Sep 2022 14:11  --------------------------------------------------------  IN: 850 mL / OUT: 0 mL / NET: 850 mL        PHYSICAL EXAM:  Vital Signs Last 24 Hrs  T(C): 36.4 (22 Sep 2022 10:56), Max: 37.2 (21 Sep 2022 17:15)  T(F): 97.6 (22 Sep 2022 10:56), Max: 98.9 (21 Sep 2022 17:15)  HR: 72 (22 Sep 2022 10:56) (72 - 98)  BP: 119/84 (22 Sep 2022 10:56) (114/69 - 130/85)  BP(mean): --  RR: 19 (22 Sep 2022 10:56) (18 - 19)  SpO2: 100% (22 Sep 2022 10:56) (95% - 100%)    Parameters below as of 22 Sep 2022 10:56  Patient On (Oxygen Delivery Method): room air          CONSTITUTIONAL: NAD, sitting up in bed  ENMT: Moist oral mucosa, EOMI   RESPIRATORY: Normal respiratory effort; lungs are clear to auscultation bilaterally  CARDIOVASCULAR: Regular rate and rhythm, normal S1 and S2, No lower extremity edema  ABDOMEN: Nontender to palpation, normoactive bowel sounds  MUSCULOSKELETAL:  No clubbing or cyanosis of digits   PSYCH: A+O to person, place, not time; odd affect   NEUROLOGY: No gross sensory or motor deficits   SKIN: No rashes; no palpable lesions      LABS:                        12.0   8.40  )-----------( 121      ( 22 Sep 2022 06:20 )             34.4     09-    142  |  106  |  8.0  ----------------------------<  116<H>  3.2<L>   |  22.0  |  0.72    Ca    8.6      22 Sep 2022 10:42  Phos  1.6       Mg     1.8     -22    TPro  5.8<L>  /  Alb  3.5  /  TBili  1.2  /  DBili  x   /  AST  1653<H>  /  ALT  3249<H>  /  AlkPhos  68  09-22    PT/INR - ( 22 Sep 2022 10:42 )   PT: 18.4 sec;   INR: 1.58 ratio         PTT - ( 22 Sep 2022 06:20 )  PTT:39.3 sec  CARDIAC MARKERS ( 22 Sep 2022 06:20 )  x     / x     / 9414 U/L / x     / 31.1 ng/mL  CARDIAC MARKERS ( 21 Sep 2022 05:15 )  x     / x     / 5379 U/L / x     / 29.0 ng/mL  CARDIAC MARKERS ( 21 Sep 2022 01:25 )  x     / x     / 5419 U/L / x     / 40.3 ng/mL  CARDIAC MARKERS ( 20 Sep 2022 16:20 )  x     / x     / 2444 U/L / x     / 20.1 ng/mL      Urinalysis Basic - ( 22 Sep 2022 12:00 )    Color: Yellow / Appearance: Clear / S.025 / pH: x  Gluc: x / Ketone: Moderate  / Bili: Negative / Urobili: Negative mg/dL   Blood: x / Protein: 15 / Nitrite: Negative   Leuk Esterase: Trace / RBC: 0-2 /HPF / WBC 0-2 /HPF   Sq Epi: x / Non Sq Epi: Occasional / Bacteria: Occasional        Culture - Blood (collected 20 Sep 2022 14:25)  Source: .Blood Blood-Peripheral  Preliminary Report (21 Sep 2022 20:01):    No growth to date.    Culture - Blood (collected 20 Sep 2022 14:20)  Source: .Blood Blood-Peripheral  Preliminary Report (21 Sep 2022 20:01):    No growth to date.    Culture - Urine (collected 20 Sep 2022 12:56)  Source: Catheterized Catheterized  Final Report (21 Sep 2022 19:49):    No growth  
[facility] Division of Hospital Medicine    Chief Complaint: Acetaminophen toxicity       SUBJECTIVE / OVERNIGHT EVENTS: No acute events overnight. HD stable. Patient walking in the room.     Patient denies chest pain, SOB, abd pain, N/V, fever, chills, dysuria or any other complaints. All remainder ROS negative.     MEDICATIONS  (STANDING):  chlorhexidine 2% Cloths 1 Application(s) Topical <User Schedule>  enoxaparin Injectable 40 milliGRAM(s) SubCutaneous every 24 hours  folic acid 1 milliGRAM(s) Oral daily  multivitamin 1 Tablet(s) Oral daily  paliperidone ER. 3 milliGRAM(s) Oral daily  thiamine 100 milliGRAM(s) Oral daily    MEDICATIONS  (PRN):  LORazepam     Tablet 1 milliGRAM(s) Oral every 6 hours PRN Agitation  LORazepam   Injectable 2 milliGRAM(s) IntraMuscular every 6 hours PRN severe agitation  sodium chloride 0.9% lock flush 10 milliLiter(s) IV Push every 1 hour PRN Pre/post blood products, medications, blood draw, and to maintain line patency    I&O's Summary      PHYSICAL EXAM:  Vital Signs Last 24 Hrs  T(C): 37.1 (26 Sep 2022 05:04), Max: 37.1 (26 Sep 2022 05:04)  T(F): 98.8 (26 Sep 2022 05:04), Max: 98.8 (26 Sep 2022 05:04)  HR: 73 (26 Sep 2022 05:04) (73 - 81)  BP: 155/84 (26 Sep 2022 05:04) (130/70 - 155/84)  BP(mean): --  RR: 18 (26 Sep 2022 05:04) (16 - 18)  SpO2: 97% (25 Sep 2022 15:30) (97% - 97%)    Parameters below as of 25 Sep 2022 15:30  Patient On (Oxygen Delivery Method): room air    CONSTITUTIONAL: NAD, well-developed  RESPIRATORY: Normal respiratory effort; lungs are clear to auscultation bilaterally  CARDIOVASCULAR: Regular rate and rhythm, normal S1 and S2, no murmur/rub/gallop  ABDOMEN: Nontender to palpation, normoactive bowel sounds  PSYCH: A+O to person, place, and time; affect appropriate  NEUROLOGY: CN 2-12 are intact and symmetric      LABS:                        9.8    6.48  )-----------( 156      ( 26 Sep 2022 06:08 )             28.9     09-26    140  |  105  |  11.4  ----------------------------<  106<H>  3.3<L>   |  24.0  |  0.61    Ca    8.6      26 Sep 2022 06:08    TPro  5.4<L>  /  Alb  3.2<L>  /  TBili  0.4  /  DBili  x   /  AST  106<H>  /  ALT  719<H>  /  AlkPhos  89  09-26              CAPILLARY BLOOD GLUCOSE            RADIOLOGY & ADDITIONAL TESTS:  Results Reviewed:   Imaging Personally Reviewed:  Electrocardiogram Personally Reviewed:                                          
[facility] Division of Hospital Medicine    Chief Complaint: Acetaminophen toxicity      SUBJECTIVE / OVERNIGHT EVENTS: No acute events overnight. HD stable.     Patient denies chest pain, SOB, abd pain, N/V, fever, chills, dysuria or any other complaints. All remainder ROS negative.     MEDICATIONS  (STANDING):  chlorhexidine 2% Cloths 1 Application(s) Topical <User Schedule>  enoxaparin Injectable 40 milliGRAM(s) SubCutaneous every 24 hours  folic acid 1 milliGRAM(s) Oral daily  multivitamin 1 Tablet(s) Oral daily  paliperidone ER. 3 milliGRAM(s) Oral daily  potassium chloride    Tablet ER 40 milliEquivalent(s) Oral daily  thiamine 100 milliGRAM(s) Oral daily    MEDICATIONS  (PRN):  LORazepam     Tablet 1 milliGRAM(s) Oral every 6 hours PRN Agitation  LORazepam   Injectable 2 milliGRAM(s) IntraMuscular every 6 hours PRN severe agitation  sodium chloride 0.9% lock flush 10 milliLiter(s) IV Push every 1 hour PRN Pre/post blood products, medications, blood draw, and to maintain line patency        I&O's Summary      PHYSICAL EXAM:  Vital Signs Last 24 Hrs  T(C): 36.5 (27 Sep 2022 13:55), Max: 36.5 (27 Sep 2022 13:55)  T(F): 97.7 (27 Sep 2022 13:55), Max: 97.7 (27 Sep 2022 13:55)  HR: 78 (27 Sep 2022 13:55) (63 - 93)  BP: 126/88 (27 Sep 2022 13:55) (125/79 - 136/88)  BP(mean): --  RR: 18 (27 Sep 2022 13:55) (18 - 18)  SpO2: 97% (27 Sep 2022 13:55) (95% - 97%)    Parameters below as of 27 Sep 2022 12:50  Patient On (Oxygen Delivery Method): room air          CONSTITUTIONAL: NAD, ambulating in hallway  RESPIRATORY: Normal respiratory effort; lungs are clear to auscultation bilaterally  CARDIOVASCULAR: Regular rate and rhythm, normal S1 and S2, no murmur/rub/gallop  ABDOMEN: Nontender to palpation, normoactive bowel sounds  PSYCH: A+O to person, place, and time; affect appropriate  NEUROLOGY: CN 2-12 are intact and symmetric      LABS:                        10.2   7.42  )-----------( 201      ( 27 Sep 2022 04:52 )             30.1     09-27    141  |  106  |  10.5  ----------------------------<  102<H>  3.8   |  28.0  |  0.62    Ca    8.5      27 Sep 2022 04:52    TPro  5.3<L>  /  Alb  3.3  /  TBili  0.4  /  DBili  x   /  AST  51<H>  /  ALT  490<H>  /  AlkPhos  67  09-27      CARDIAC MARKERS ( 27 Sep 2022 04:52 )  x     / x     / 231 U/L / x     / 1.4 ng/mL          CAPILLARY BLOOD GLUCOSE            RADIOLOGY & ADDITIONAL TESTS:  Results Reviewed:   Imaging Personally Reviewed:  Electrocardiogram Personally Reviewed:                                          
Patient is unable to recall events leading to hospitalization but states that his parents are out to "sabotage" him. Denied SOB. On 1:1.     ICU Vital Signs Last  48 Hrs,    T(C): 36.4 (22 Sep 2022 10:56), Max: 37.2 (21 Sep 2022 17:15)  T(F): 97.6 (22 Sep 2022 10:56), Max: 98.9 (21 Sep 2022 17:15)  HR: 72 (22 Sep 2022 10:56) (72 - 106)  BP: 119/84 (22 Sep 2022 10:56) (114/69 - 155/72)  BP(mean): 96 (21 Sep 2022 14:00) (88 - 96)  RR: 19 (22 Sep 2022 10:56) (18 - 19)  SpO2: 100% (22 Sep 2022 10:56) (95% - 100%)    Parameters below as of 22 Sep 2022 10:56  Patient On (Oxygen Delivery Method): room air    T(C): 37.3 (21 Sep 2022 10:51), Max: 37.9 (21 Sep 2022 03:30)  T(F): 99.2 (21 Sep 2022 10:51), Max: 100.2 (21 Sep 2022 03:30)  HR: 88 (21 Sep 2022 10:00) (61 - 116)  BP: 153/84 (21 Sep 2022 10:00) (88/58 - 153/87)  BP(mean): 104 (21 Sep 2022 10:00) (65 - 113)  RR: 19 (21 Sep 2022 10:00) (14 - 415)  SpO2: 95% (21 Sep 2022 10:00) (95% - 100%)    O2 Parameters below as of 21 Sep 2022 08:00  Patient On (Oxygen Delivery Method): room air    I&O's Detail    20 Sep 2022 07:01  -  21 Sep 2022 07:00  --------------------------------------------------------  IN:    freetext medication - Infusion: 1300 mL    IV PiggyBack: 100 mL    IV PiggyBack: 500 mL    Lactated Ringers: 600 mL    Lactated Ringers Bolus: 1000 mL    Norepinephrine: 91.1 mL    Other (mL): 1200 mL    Propofol: 297.7 mL  Total IN: 5088.8 mL    OUT:    Indwelling Catheter - Urethral (mL): 2375 mL    Other (mL): 1200 mL  Total OUT: 3575 mL  Total NET: 1513.8 mL  21 Sep 2022 07:01  -  21 Sep 2022 11:00  --------------------------------------------------------  IN:    freetext medication - Infusion: 300 mL    IV PiggyBack: 100 mL    IV PiggyBack: 200 mL    Lactated Ringers: 100 mL  Total IN: 700 mL    OUT:    Indwelling Catheter - Urethral (mL): 150 mL    Propofol: 0 mL  Total OUT: 150 mL    Total NET: 550 mL    Physical Exam  General: WDWN male in NAD  Cardiac: S1S2 RRR  Respiratory: CTAB  Abdomen: Soft, NT  Extremities: No edema  Neuro: Alert and awake     142    |  106    |  8.0    ----------------------------<  116<H>  Ca:8.6   (22 Sep 2022 10:42)  3.2<L>   |  22.0   |  0.72       TPro  5.8<L>  /  Alb  3.5    /  TBili  1.2    /  DBili  x      /  AST  1653<H>  /  ALT  3249<H>  /  AlkPhos  68     22 Sep 2022 10:42                        12.0<L>  8.40  )-----------( 121<L>    ( 22 Sep 2022 06:20 )             34.4<L>    Phos:1.6 mg/dL<L> M.8 mg/dL PTH:-- Uric acid:-- Serum Osm:--  Ferritin:-- Iron:-- TIBC:-- Tsat:--  B12:-- TSH:-- ( @ 10:42)    Urinalysis Basic - ( 20 Sep 2022 12:56 )  Color: Yellow / Appearance: Clear / S.025 / pH: x  Gluc: x / Ketone: Trace<!>  / Bili: Negative / Urobili: Negative mg/dL   Blood: x / Protein: 100 mg/dL<!> / Nitrite: Negative   Leuk Esterase: Negative / RBC: 3-5 /HPF<!> / WBC 0-2 /HPF   Sq Epi: x / Non Sq Epi: Few / Bacteria: Few<!>    ______________________________________________________        146<H>  |  99  |  9.5  ----------------------------<  194<H>  3.1<L>   |  20.0<L>  |  0.88    Ca    8.5      21 Sep 2022 05:15  Phos  3.2       Mg     1.9         TPro  6.2<L>  /  Alb  3.7  /  TBili  0.9  /  DBili  x   /  AST  196<H>  /  ALT  188<H>  /  AlkPhos  64                            16.4   20.58 )-----------( 265      ( 21 Sep 2022 05:15 )             45.9     MEDICATIONS  (STANDING):  acetylcysteine 30 Gram(s),Dextrose 5% in Water 1000 milliLiter(s) 30 Gram(s) (100 mL/Hr) IV Continuous <Continuous>  chlorhexidine 2% Cloths 1 Application(s) Topical <User Schedule>  enoxaparin Injectable 40 milliGRAM(s) SubCutaneous every 24 hours  fomepizole IVPB 1200 milliGRAM(s) IV Intermittent every 12 hours    MEDICATIONS  (PRN):  sodium chloride 0.9% lock flush 10 milliLiter(s) IV Push every 1 hour PRN Pre/post blood products, medications, blood draw, and to maintain line patency    < from: CT Chest No Cont (22 @ 20:03) >    ACC: [id number] EXAM:  CT CHEST                          PROCEDURE DATE:  2022          INTERPRETATION:  INDICATION: leukocytosis r.o pneumonia  TECHNIQUE: Unenhanced CT of the chest. Coronal, sagittal, and MIP images   were reconstructed and reviewed.  COMPARISON: No prior chest CT.    FINDINGS:    AIRWAYS, LUNGS and PLEURA: There is obliteration of the lingular   bronchus. The other central airways are patent. Consolidation of the   lingula and patchy opacities within left upper lobe likely representing   pneumonia. No pleural effusion.    MEDIASTINUM AND MASON: No lymphadenopathy.    HEART AND VESSELS: Heart size is normal. No pericardial effusion.   Thoracic aorta and pulmonary artery normal in diameter.    VISUALIZED UPPER ABDOMEN: Within normal limits.    CHEST WALL AND BONES: No aggressive osseous lesion. Gynecomastia.    LOWER NECK: Within normal limits.    IMPRESSION:    Consolidation of the lingula and patchy opacities within left upper lobe   likely representing pneumonia.Recommend follow-up chest CT in 4 weeks to   determine resolution.    --- End of Report ---            ALEXIS ESPARZA MD; Attending Radiologist  This document has been electronically signed. Sep 21 2022  8:17PM    < end of copied text >  Creatinine, Serum: 0.72 mg/dL (22 @ 10:42)       Historical Values  Creatinine, Serum: 0.72 mg/dL (22 @ 10:42)   Creatinine, Serum: 0.76 mg/dL (22 @ 06:20)   Creatinine, Serum: 0.97 mg/dL (22 @ 17:00)   Creatinine, Serum: 0.98 mg/dL (22 @ 11:45)   Creatinine, Serum: 0.88 mg/dL (22 @ 05:15)   Creatinine, Serum: 0.63 mg/dL (22 @ 01:25)   Creatinine, Serum: 0.79 mg/dL (22 @ 16:20)   Creatinine, Serum: 0.96 mg/dL (22 @ 13:17)   Creatinine, Serum: 0.68 mg/dL (22 @ 21:51) Creatine Kinase, Serum: 9414 U/L (22 @ 06:20)       Historical Values  Creatine Kinase, Serum: 9414 U/L (22 @ 06:20)   Creatine Kinase, Serum: 5379 U/L (22 @ 05:15)   Creatine Kinase, Serum: 5419 U/L (22 @ 01:25)   Creatine Kinase, Serum: 2444 U/L (22 @ 16:20)   Creatine Kinase, Serum: 735: Hemolyzed. Interpret with caution U/L (22 @ 13:17) < from: 12 Lead ECG (22 @ 20:53) >    Ventricular Rate 69 BPM    Atrial Rate 69 BPM    P-R Interval 106 ms    QRS Duration 98 ms    Q-T Interval 376 ms    QTC Calculation(Bazett) 402 ms    P Axis 14 degrees    R Axis 18 degrees    T Axis 5 degrees    Diagnosis Line Sinus rhythm with short VA  Otherwise normal ECG    Confirmed by PHUONG ALLRED ([id number]) on 2022 2:03:34 AM    Per Toxicology :    Patient s/p massive APAP overdose, with declining levels, but AST/ALT Remain Elevated,   Currently on Randy dosing of NAC, and Fomepizole for Cyp2E6 inhibition.     To stop fomepizole when APAP undetectable.      Continue IV NAC at phase 3 dosing (100mg/kg/16hrs) until AST/ALT peak, and subsequently diminish by 50% peak value.            
Reason for visit: Acetaminophen toxicity requiring HD    Subjective: No acute overnight event. Patient denied any cardiac or urinary complains. No fever/chills. Lt arm swelling.    ROS: All systems were reviewed in detail pertinent positive and negative mentioned above, rest are negative.    Physical Exam:  Gen: no acute distress  MS: alert, conversing normally  Eyes: EOMI, no icterus  HENT: NCAT, MMM  CV: rhythm reg reg, rate normal, no m/g/r  Chest: CTAB, no w/r/r,  Abd: soft, NT, ND  Extremities: No edema. Lt arm swelling    =======================================================  Vital Signs Last 24 Hrs  T(C): 36.4 (24 Sep 2022 18:45), Max: 36.9 (24 Sep 2022 08:00)  T(F): 97.6 (24 Sep 2022 18:45), Max: 98.4 (24 Sep 2022 08:00)  HR: 83 (24 Sep 2022 18:45) (71 - 83)  BP: 133/85 (24 Sep 2022 18:45) (122/68 - 133/85)  BP(mean): --  RR: 18 (24 Sep 2022 18:45) (16 - 18)  SpO2: 96% (24 Sep 2022 18:45) (96% - 97%)    Parameters below as of 24 Sep 2022 18:45  Patient On (Oxygen Delivery Method): room air      I&O's Summary    23 Sep 2022 07:01  -  24 Sep 2022 07:00  --------------------------------------------------------  IN: 0 mL / OUT: 900 mL / NET: -900 mL      =======================================================  Current Antibiotics:  cefTRIAXone Injectable. 1000 milliGRAM(s) IV Push every 24 hours    Other medications:  chlorhexidine 2% Cloths 1 Application(s) Topical <User Schedule>  enoxaparin Injectable 40 milliGRAM(s) SubCutaneous every 24 hours  folic acid 1 milliGRAM(s) Oral daily  multivitamin 1 Tablet(s) Oral daily  potassium phosphate / sodium phosphate Tablet (K-PHOS No. 2) 1 Tablet(s) Oral four times a day  sodium chloride 0.9%. 1000 milliLiter(s) IV Continuous <Continuous>  thiamine 100 milliGRAM(s) Oral daily    =======================================================  09-24    142  |  105  |  7.6<L>  ----------------------------<  91  3.0<L>   |  24.0  |  0.51    Ca    8.6      24 Sep 2022 07:01  Phos  1.9     09-23  Mg     1.7     09-23    TPro  5.7<L>  /  Alb  3.3  /  TBili  1.0  /  DBili  0.3  /  AST  211<H>  /  ALT  1287<H>  /  AlkPhos  70  09-24    Creatinine, Serum: 0.51 mg/dL (09-24-22 @ 07:01)  Creatinine, Serum: 0.62 mg/dL (09-23-22 @ 06:11)  Creatinine, Serum: 0.58 mg/dL (09-22-22 @ 15:31)  Creatinine, Serum: 0.72 mg/dL (09-22-22 @ 10:42)  Creatinine, Serum: 0.76 mg/dL (09-22-22 @ 06:20)  Creatinine, Serum: 0.97 mg/dL (09-21-22 @ 17:00)  Creatinine, Serum: 0.98 mg/dL (09-21-22 @ 11:45)  Creatinine, Serum: 0.88 mg/dL (09-21-22 @ 05:15)  Creatinine, Serum: 0.63 mg/dL (09-21-22 @ 01:25)  Creatinine, Serum: 0.79 mg/dL (09-20-22 @ 16:20)  Creatinine, Serum: 0.96 mg/dL (09-20-22 @ 13:17)      =======================================================    
[facility] Division of Hospital Medicine     Chief Complaint:  Acetaminophen toxicity     SUBJECTIVE / OVERNIGHT EVENTS:   Pt says he feels OK   Asking about his swollen arms, discussed issues with IV     Patient denies chest pain, SOB, abd pain, N/V, fever, chills, dysuria or any other complaints. All remainder ROS negative.     MEDICATIONS  (STANDING):   acetylcysteine 30 Gram(s),Dextrose 5% in Water 1000 milliLiter(s) 30 Gram(s) (100 mL/Hr) IV Continuous <Continuous>   cefTRIAXone Injectable. 1000 milliGRAM(s) IV Push every 24 hours  chlorhexidine 2% Cloths 1 Application(s) Topical <User Schedule>  enoxaparin Injectable 40 milliGRAM(s) SubCutaneous every 24 hours  folic acid 1 milliGRAM(s) Oral daily  multivitamin 1 Tablet(s) Oral daily  potassium phosphate / sodium phosphate Tablet (K-PHOS No. 2) 1 Tablet(s) Oral four times a day  sodium chloride 0.9%. 1000 milliLiter(s) (75 mL/Hr) IV Continuous <Continuous>  thiamine 100 milliGRAM(s) Oral daily    MEDICATIONS  (PRN):  sodium chloride 0.9% lock flush 10 milliLiter(s) IV Push every 1 hour PRN Pre/post blood products, medications, blood draw, and to maintain line patency        I&O's Summary    22 Sep 2022 07:01  -  23 Sep 2022 07:00  --------------------------------------------------------  IN: 2100 mL / OUT: 0 mL / NET: 2100 mL        PHYSICAL EXAM:   Vital Signs Last 24 Hrs  T(C): 36.9 (23 Sep 2022 09:40), Max: 37.4 (23 Sep 2022 05:11)  T(F): 98.4 (23 Sep 2022 09:40), Max: 99.4 (23 Sep 2022 05:11)  HR: 73 (23 Sep 2022 09:40) (70 - 83)  BP: 130/74 (23 Sep 2022 09:40) (110/71 - 130/74)  BP(mean): --  RR: 18 (23 Sep 2022 09:40) (18 - 18)  SpO2: 98% (23 Sep 2022 09:40) (97% - 98%)    Parameters below as of 23 Sep 2022 09:40  Patient On (Oxygen Delivery Method): room air        CONSTITUTIONAL: NAD, sitting up in bed  ENMT: Moist oral mucosa, EOMI   RESPIRATORY: Normal respiratory effort; lungs are clear to auscultation bilaterally  CARDIOVASCULAR: Regular rate and rhythm, normal S1 and S2, No lower extremity edema  ABDOMEN: Nontender to palpation, normoactive bowel sounds  MUSCULOSKELETAL:  No clubbing or cyanosis of digits   PSYCH: A+O to person, place, not time; odd affect   NEUROLOGY: No gross sensory or motor deficits   SKIN: No rashes; no palpable lesions        LABS:                        10.6   4.74  )-----------( 103      ( 23 Sep 2022 06:11 )             30.0     09-23    143  |  105  |  3.6<L>  ----------------------------<  137<H>  3.0<L>   |  27.0  |  0.62    Ca    8.2<L>      23 Sep 2022 06:11  Phos  1.9     09-23  Mg     1.7     09-23    TPro  5.1<L>  /  Alb  3.1<L>  /  TBili  1.2  /  DBili  x   /  AST  412<H>  /  ALT  1899<H>  /  AlkPhos  51  09-23    PT/INR - ( 23 Sep 2022 06:11 )   PT: 15.2 sec;   INR: 1.31 ratio         PTT - ( 22 Sep 2022 06:20 )  PTT:39.3 sec  CARDIAC MARKERS ( 23 Sep 2022 06:11 )  x     / x     / 4120 U/L / x     / 7.3 ng/mL  CARDIAC MARKERS ( 22 Sep 2022 15:31 )  x     / x     / 7685 U/L / x     / 18.9 ng/mL  CARDIAC MARKERS ( 22 Sep 2022 06:20 )  x     / x     / 9414 U/L / x     / 31.1 ng/mL      Urinalysis Basic - ( 22 Sep 2022 12:00 )    Color: Yellow / Appearance: Clear / S.025 / pH: x  Gluc: x / Ketone: Moderate  / Bili: Negative / Urobili: Negative mg/dL   Blood: x / Protein: 15 / Nitrite: Negative   Leuk Esterase: Trace / RBC: 0-2 /HPF / WBC 0-2 /HPF   Sq Epi: x / Non Sq Epi: Occasional / Bacteria: Occasional    
MEDICAL TOXICOLOGY PROGRESS NOTE    Overnight events: Discussed case with floor resident. Patient reportedly extubated this morning without complications, and is status post ECTR. His reported mental status is AAOx3, although patient is agitated and not forthcoming with history. Patient at one point reports ingesting 4 bottles of acetaminophen, but at another time reports ingesting 8 bottles. Unclear dosage or any other co-ingestants. Please see MICU and medicine notes for details.     MEDICATIONS  (STANDING):  acetylcysteine 30 Gram(s),Dextrose 5% in Water 1000 milliLiter(s) 30 Gram(s) (100 mL/Hr) IV Continuous <Continuous>  chlorhexidine 2% Cloths 1 Application(s) Topical <User Schedule>  enoxaparin Injectable 40 milliGRAM(s) SubCutaneous every 24 hours  lactated ringers. 1000 milliLiter(s) (75 mL/Hr) IV Continuous <Continuous>  potassium chloride    Tablet ER 40 milliEquivalent(s) Oral once    MEDICATIONS  (PRN):  OLANZapine Injectable 10 milliGRAM(s) IntraMuscular every 8 hours PRN agitation  sodium chloride 0.9% lock flush 10 milliLiter(s) IV Push every 1 hour PRN Pre/post blood products, medications, blood draw, and to maintain line patency      Vital Signs Last 24 Hrs  T(C): 36.8 (21 Sep 2022 15:29), Max: 37.9 (21 Sep 2022 03:30)  T(F): 98.2 (21 Sep 2022 15:29), Max: 100.2 (21 Sep 2022 03:30)  HR: 98 (21 Sep 2022 15:29) (63 - 106)  BP: 130/85 (21 Sep 2022 15:29) (94/53 - 155/72)  BP(mean): 96 (21 Sep 2022 14:00) (65 - 116)  RR: 18 (21 Sep 2022 15:29) (12 - 415)  SpO2: 95% (21 Sep 2022 15:29) (95% - 100%)    Parameters below as of 21 Sep 2022 15:29  Patient On (Oxygen Delivery Method): room air        Allergies    Abilify (Other)    Intolerances    Pt prefers Lactaid milk. (Unknown)        REVIEW OF SYSTEMS:  unable to perform due to intoxication, dementia, or illness    PHYSICAL EXAM  Vital Signs Last 24 Hrs  T(C): 36.8 (21 Sep 2022 15:29), Max: 37.9 (21 Sep 2022 03:30)  T(F): 98.2 (21 Sep 2022 15:29), Max: 100.2 (21 Sep 2022 03:30)  HR: 98 (21 Sep 2022 15:29) (63 - 106)  BP: 130/85 (21 Sep 2022 15:29) (94/53 - 155/72)  BP(mean): 96 (21 Sep 2022 14:00) (65 - 116)  RR: 18 (21 Sep 2022 15:29) (12 - 415)  SpO2: 95% (21 Sep 2022 15:29) (95% - 100%)    Parameters below as of 21 Sep 2022 15:29  Patient On (Oxygen Delivery Method): room air    Exam per primary team    SIGNIFICANT LABORATORY STUDIES:                        16.4   20.58 )-----------( 265      ( 21 Sep 2022 05:15 )             45.9           141  |  103  |  11.8  ----------------------------<  167<H>  3.3<L>   |  19.0<L>  |  0.98    Ca    8.6      21 Sep 2022 11:45  Phos  2.4       Mg     2.3         TPro  6.2<L>  /  Alb  3.7  /  TBili  1.1  /  DBili  0.3  /  AST  1130<H>  /  ALT  1152<H>  /  AlkPhos  67        PT/INR - ( 21 Sep 2022 05:15 )   PT: 19.4 sec;   INR: 1.66 ratio         PTT - ( 20 Sep 2022 16:20 )  PTT:23.5 sec    Urinalysis Basic - ( 20 Sep 2022 12:56 )    Color: Yellow / Appearance: Clear / S.025 / pH: x  Gluc: x / Ketone: Trace  / Bili: Negative / Urobili: Negative mg/dL   Blood: x / Protein: 100 mg/dL / Nitrite: Negative   Leuk Esterase: Negative / RBC: 3-5 /HPF / WBC 0-2 /HPF   Sq Epi: x / Non Sq Epi: Few / Bacteria: Few        Anion Gap: 19<H>  @ 11:45  CK: --  @ 11:45  Troponin:  --   @ 11:45  Pro-BNP:  --   11:45  VBG:  --   11:45  Carboxyhemoglobin %:  --   11:45  Methemoglobin %:  --   11:45  Osmolality Serum:  314<H>   @ 11:45  Aspirin Level: --   11:45  Acetaminophen Level:  41.8<H>   @ 11:45  Ethanol Level:  --   @ 11:45  Digoxin Level:  --   @ 11:45  Phenytoin Level:  --   @ 11:45  Carbamazepine level:  --   @ 11:45  Lamotrigine level:  --   11:45  Anion Gap: 27<H>  @ 05:15  CK: 5379<H>  @ 05:15  Troponin:  --   @ 05:15  Pro-BNP:  --   @ 05:15  VB   @ 05:15  Carboxyhemoglobin %:  --   @ 05:15  Methemoglobin %:  --   @ 05:15  Osmolality Serum:  320<H>   @ 05:15  Aspirin Level: --   @ 05:15  Acetaminophen Level:  102.9<H>   @ 05:15  Ethanol Level:  --   @ 05:15  Digoxin Level:  --   @ 05:15  Phenytoin Level:  --   @ 05:15  Carbamazepine level:  --   @ 05:15  Lamotrigine level:  --   @ 05:15  Anion Gap: 20<H>  @ 01:25  CK: 5419<H>  @ 01:25  Troponin:  --   @ 01:25  Pro-BNP:  --  :25  VBG:  --   @ :25  Carboxyhemoglobin %:  --  :25  Methemoglobin %:  --   @ :25  Osmolality Serum:  312<H>   @ 01:25  Aspirin Level: --   @ :25  Acetaminophen Level:  154.3<HH>   @ 01:25  Ethanol Level:  --   @ 01:25  Digoxin Level:  --   @ 01:25  Phenytoin Level:  --   @ :25  Carbamazepine level:  --   @ 01:25  Lamotrigine level:  --   @ 01:25  Anion Gap: 17  @ 16:20  CK: 2444<H>  @ 16:20  Troponin:  --   @ 16:20  Pro-BNP:  --   @ 16:20  VB<H>   @ 16:20  Carboxyhemoglobin %:  --   @ 16:20  Methemoglobin %:  --   @ 16:20  Osmolality Serum:  318<H>   @ 16:20  Aspirin Level: --   @ 16:20  Acetaminophen Level:  561.2<HH>   @ 16:20  Ethanol Level:  --   @ 16:20  Digoxin Level:  --   @ 16:20  Phenytoin Level:  --   @ 16:20  Carbamazepine level:  --   @ 16:20  Lamotrigine level:  --   @ 16:20  Anion Gap: --  @ 14:48  CK: --  14:48  Troponin:  --   @ 14:48  Pro-BNP:  --   @ 14:48  VB<H>   @ 14:48  Carboxyhemoglobin %:  --   @ 14:48  Methemoglobin %:  --   @ 14:48  Osmolality Serum:  --   @ 14:48  Aspirin Level: --   @ 14:48  Acetaminophen Level:  --   @ 14:48  Ethanol Level:  --   @ 14:48  Digoxin Level:  --   @ 14:48  Phenytoin Level:  --   @ 14:48  Carbamazepine level:  --   @ 14:48  Lamotrigine level:  --   @ 14:48  Anion Gap: 24<H>  @ 13:17  CK: 735<H>  @ 13:17  Troponin:  --   @ 13:17  Pro-BNP:  --   @ 13:17  VBG:  --   @ 13:17  Carboxyhemoglobin %:  --   @ 13:17  Methemoglobin %:  --   @ 13:17  Osmolality Serum:  --   @ 13:17  Aspirin Level: <0.6<L>   @ 13:17  Acetaminophen Level:  >600.0<HH>   @ 13:17  Ethanol Level:  --   @ 13:17  Digoxin Level:  --   @ 13:17  Phenytoin Level:  --   @ 13:17  Carbamazepine level:  --   @ 13:17  Lamotrigine level:  --   @ 13:17  Anion Gap: --  @ 12:56  CK: --  @ 12:56  Troponin:  --   @ 12:56  Pro-BNP:  --   @ 12:56  VB   @ 12:56  Carboxyhemoglobin %:  --   12:56  Methemoglobin %:  --   @ 12:56  Osmolality Serum:  --   @ 12:56  Aspirin Level: --   @ 12:56  Acetaminophen Level:  --   @ 12:56  Ethanol Level:  --   @ 12:56  Digoxin Level:  --   @ 12:56  Phenytoin Level:  --   @ 12:56  Carbamazepine level:  --   @ 12:56  Lamotrigine level:  --   @ 12:56      ANION Gap  OSM Gap  CK  ASA  APAP  Ethanol  VBG  Carboxy/Met-hemoglobin %  Lactate  ___drug level    ECG:  rate, rhythm, SD, QRS, QTc    RADIOLOGIC STUDIES  
[facility] DIVISION OF KIDNEY DISEASES AND HYPERTENSION -- FOLLOW UP NOTE  --------------------------------------------------------------------------------  Chief Complaint:  Rhabdomyolysis  24 hour events/subjective:  no acute event  pt seen and examined; feels well  parents at bedside  pt is on 1;1 observation    PAST HISTORY  --------------------------------------------------------------------------------  No significant changes to PMH, PSH, FHx, SHx, unless otherwise noted    ALLERGIES & MEDICATIONS  --------------------------------------------------------------------------------  Allergies    Abilify (Other)    Intolerances    Pt prefers Lactaid milk. (Unknown)    Standing Inpatient Medications  acetylcysteine 30 Gram(s),Dextrose 5% in Water 1000 milliLiter(s) 30 Gram(s) IV Continuous <Continuous>  cefTRIAXone Injectable. 1000 milliGRAM(s) IV Push every 24 hours  chlorhexidine 2% Cloths 1 Application(s) Topical <User Schedule>  enoxaparin Injectable 40 milliGRAM(s) SubCutaneous every 24 hours  folic acid 1 milliGRAM(s) Oral daily  multivitamin 1 Tablet(s) Oral daily  potassium phosphate / sodium phosphate Tablet (K-PHOS No. 2) 1 Tablet(s) Oral four times a day  sodium chloride 0.9%. 1000 milliLiter(s) IV Continuous <Continuous>  thiamine 100 milliGRAM(s) Oral daily    PRN Inpatient Medications  LORazepam     Tablet 1 milliGRAM(s) Oral every 6 hours PRN  LORazepam   Injectable 2 milliGRAM(s) IntraMuscular every 6 hours PRN  sodium chloride 0.9% lock flush 10 milliLiter(s) IV Push every 1 hour PRN      REVIEW OF SYSTEMS  --------------------------------------------------------------------------------  Gen: No weight changes, fatigue, fevers/chills, weakness  Skin: No rashes  Head/Eyes/Ears/Mouth: No headache; Normal hearing; Normal vision w/o blurriness; No sinus pain/discomfort, sore throat  Respiratory: No dyspnea, cough, wheezing, hemoptysis  CV: No chest pain, PND, orthopnea  GI: No abdominal pain, diarrhea, constipation, nausea, vomiting, melena, hematochezia  : No increased frequency, dysuria, hematuria, nocturia  MSK: No joint pain/swelling; no back pain; no edema  Neuro: No dizziness/lightheadedness, weakness, seizures, numbness, tingling  Heme: No easy bruising or bleeding  Endo: No heat/cold intolerance  Psych: No significant nervousness, anxiety, stress, depression    All other systems were reviewed and are negative, except as noted.    VITALS/PHYSICAL EXAM  --------------------------------------------------------------------------------  T(C): 36.9 (09-23-22 @ 09:40), Max: 37.4 (09-23-22 @ 05:11)  HR: 73 (09-23-22 @ 09:40) (70 - 83)  BP: 130/74 (09-23-22 @ 09:40) (110/71 - 130/74)  RR: 18 (09-23-22 @ 09:40) (18 - 18)  SpO2: 98% (09-23-22 @ 09:40) (97% - 98%)  Wt(kg): --        09-22-22 @ 07:01  -  09-23-22 @ 07:00  --------------------------------------------------------  IN: 2100 mL / OUT: 0 mL / NET: 2100 mL      Physical Exam:  	Gen: NAD  	HEENT:  supple neck, clear oropharynx  	Pulm: CTA B/L  	CV: RRR, S1S2; no rub  	Back: No spinal or CVA tenderness; no sacral edema  	Abd: +BS, soft, nontender/nondistended  	: No suprapubic tenderness  	UE: Warm,+ edema  	LE: Warm,  no edema  	Neuro: No focal deficit  	Psych: Normal affect and mood  	Skin: Warm      LABS/STUDIES  --------------------------------------------------------------------------------              10.6   4.74  >-----------<  103      [09-23-22 @ 06:11]              30.0     143  |  105  |  3.6  ----------------------------<  137      [09-23-22 @ 06:11]  3.0   |  27.0  |  0.62        Ca     8.2     [09-23-22 @ 06:11]      Mg     1.7     [09-23-22 @ 06:11]      Phos  1.9     [09-23-22 @ 06:11]    TPro  5.1  /  Alb  3.1  /  TBili  1.2  /  DBili  x   /  AST  412  /  ALT  1899  /  AlkPhos  51  [09-23-22 @ 06:11]    PT/INR: PT 15.2 , INR 1.31       [09-23-22 @ 06:11]  PTT: 39.3       [09-22-22 @ 06:20]    CK 4120      [09-23-22 @ 06:11]  Serum Osmolality 297      [09-22-22 @ 06:20]    Creatinine Trend:  SCr 0.62 [09-23 @ 06:11]  SCr 0.58 [09-22 @ 15:31]  SCr 0.72 [09-22 @ 10:42]  SCr 0.76 [09-22 @ 06:20]  SCr 0.97 [09-21 @ 17:00]    Urinalysis - [09-22-22 @ 12:00]      Color Yellow / Appearance Clear / SG 1.025 / pH 5.0      Gluc 100 / Ketone Moderate  / Bili Negative / Urobili Negative       Blood Trace / Protein 15 / Leuk Est Trace / Nitrite Negative      RBC 0-2 / WBC 0-2 / Hyaline  / Gran  / Sq Epi  / Non Sq Epi Occasional / Bacteria Occasional    Urine Creatinine 204      [09-22-22 @ 12:00]  Urine Protein 80.0      [09-22-22 @ 12:00]  Urine Sodium 31      [09-22-22 @ 12:00]  Urine Urea Nitrogen 722      [09-22-22 @ 12:00]  Urine Osmolality 666      [09-22-22 @ 12:00]    TSH 1.37      [09-20-22 @ 13:17]    HBsAb 16.9      [09-20-22 @ 20:52]  HBsAb Reactive      [09-20-22 @ 20:52]  HBsAg Nonreact      [09-20-22 @ 20:52]  HBcAb Nonreact      [09-20-22 @ 20:52]  HCV 0.09, Nonreact      [09-20-22 @ 20:52]  HIV Nonreact      [09-22-22 @ 11:20]    Free Light Chains: kappa 1.15, lambda 1.12, ratio = 1.03      [09-22 @ 11:20]  
[facility] Division of Hospital Medicine    Chief Complaint: Acetaminophen toxicity     SUBJECTIVE / OVERNIGHT EVENTS: No acute events overnight. HD stable.     Patient denies chest pain, SOB, abd pain, N/V, fever, chills, dysuria or any other complaints. All remainder ROS negative.     MEDICATIONS  (STANDING):  cefTRIAXone Injectable. 1000 milliGRAM(s) IV Push every 24 hours  chlorhexidine 2% Cloths 1 Application(s) Topical <User Schedule>  enoxaparin Injectable 40 milliGRAM(s) SubCutaneous every 24 hours  folic acid 1 milliGRAM(s) Oral daily  multivitamin 1 Tablet(s) Oral daily  sodium chloride 0.9%. 1000 milliLiter(s) (75 mL/Hr) IV Continuous <Continuous>  thiamine 100 milliGRAM(s) Oral daily    MEDICATIONS  (PRN):  LORazepam     Tablet 1 milliGRAM(s) Oral every 6 hours PRN Agitation  LORazepam   Injectable 2 milliGRAM(s) IntraMuscular every 6 hours PRN severe agitation  sodium chloride 0.9% lock flush 10 milliLiter(s) IV Push every 1 hour PRN Pre/post blood products, medications, blood draw, and to maintain line patency        I&O's Summary      PHYSICAL EXAM:  Vital Signs Last 24 Hrs  T(C): 36.7 (25 Sep 2022 12:40), Max: 37.1 (25 Sep 2022 04:30)  T(F): 98 (25 Sep 2022 12:40), Max: 98.7 (25 Sep 2022 04:30)  HR: 86 (25 Sep 2022 12:40) (83 - 90)  BP: 138/81 (25 Sep 2022 12:40) (119/81 - 138/81)  BP(mean): --  RR: 18 (25 Sep 2022 12:40) (18 - 18)  SpO2: 95% (25 Sep 2022 12:40) (95% - 96%)    Parameters below as of 25 Sep 2022 12:40  Patient On (Oxygen Delivery Method): room air            CONSTITUTIONAL: NAD, well-developed, well-groomed  ENMT: Moist oral mucosa, no pharyngeal injection or exudates; normal dentition  RESPIRATORY: Normal respiratory effort; lungs are clear to auscultation bilaterally  CARDIOVASCULAR: Regular rate and rhythm, normal S1 and S2, no murmur/rub/gallop; No lower extremity edema; Peripheral pulses are 2+ bilaterally  ABDOMEN: Nontender to palpation, normoactive bowel sounds, no rebound/guarding; No hepatosplenomegaly  MUSCLOSKELETAL:  Normal gait; no clubbing or cyanosis of digits; no joint swelling or tenderness to palpation  PSYCH: A+O to person, place, and time; affect appropriate  NEUROLOGY: CN 2-12 are intact and symmetric; no gross sensory deficits;   SKIN: No rashes; no palpable lesions    LABS:                        10.8   3.59  )-----------( 142      ( 25 Sep 2022 04:45 )             30.9     09-25    141  |  107  |  9.8  ----------------------------<  112<H>  3.3<L>   |  24.0  |  0.56    Ca    8.4      25 Sep 2022 04:45    TPro  5.7<L>  /  Alb  3.4  /  TBili  0.7  /  DBili  x   /  AST  207<H>  /  ALT  1034<H>  /  AlkPhos  74  09-25    PT/INR - ( 24 Sep 2022 07:01 )   PT: 14.2 sec;   INR: 1.22 ratio           CARDIAC MARKERS ( 24 Sep 2022 07:01 )  x     / x     / 1679 U/L / x     / 3.8 ng/mL          CAPILLARY BLOOD GLUCOSE            RADIOLOGY & ADDITIONAL TESTS:  Results Reviewed:   Imaging Personally Reviewed:  Electrocardiogram Personally Reviewed:                                          
Extubated this a.m. On room air, eating breakfast, sitting in chair. Patient is unable to recall events leading to hospitalization but states that his parents are out to "sabotage" him. Denied SOB. On 1:1.     ICU Vital Signs Last 24 Hrs  T(C): 37.3 (21 Sep 2022 10:51), Max: 37.9 (21 Sep 2022 03:30)  T(F): 99.2 (21 Sep 2022 10:51), Max: 100.2 (21 Sep 2022 03:30)  HR: 88 (21 Sep 2022 10:00) (61 - 116)  BP: 153/84 (21 Sep 2022 10:00) (88/58 - 153/87)  BP(mean): 104 (21 Sep 2022 10:00) (65 - 113)  ABP: --  ABP(mean): --  RR: 19 (21 Sep 2022 10:00) (14 - 415)  SpO2: 95% (21 Sep 2022 10:00) (95% - 100%)    O2 Parameters below as of 21 Sep 2022 08:00  Patient On (Oxygen Delivery Method): room air    I&O's Detail    20 Sep 2022 07:01  -  21 Sep 2022 07:00  --------------------------------------------------------  IN:    freetext medication - Infusion: 1300 mL    IV PiggyBack: 100 mL    IV PiggyBack: 500 mL    Lactated Ringers: 600 mL    Lactated Ringers Bolus: 1000 mL    Norepinephrine: 91.1 mL    Other (mL): 1200 mL    Propofol: 297.7 mL  Total IN: 5088.8 mL    OUT:    Indwelling Catheter - Urethral (mL): 2375 mL    Other (mL): 1200 mL  Total OUT: 3575 mL  Total NET: 1513.8 mL  21 Sep 2022 07:01  -  21 Sep 2022 11:00  --------------------------------------------------------  IN:    freetext medication - Infusion: 300 mL    IV PiggyBack: 100 mL    IV PiggyBack: 200 mL    Lactated Ringers: 100 mL  Total IN: 700 mL    OUT:    Indwelling Catheter - Urethral (mL): 150 mL    Propofol: 0 mL  Total OUT: 150 mL    Total NET: 550 mL    Physical Exam  General: WDWN male in NAD  Cardiac: S1S2 RRR  Respiratory: CTAB  Abdomen: Soft, NT  Extremities: No edema  Neuro: Alert and awake     09-21    146<H>  |  99  |  9.5  ----------------------------<  194<H>  3.1<L>   |  20.0<L>  |  0.88    Ca    8.5      21 Sep 2022 05:15  Phos  3.2     09-21  Mg     1.9     09-21    TPro  6.2<L>  /  Alb  3.7  /  TBili  0.9  /  DBili  x   /  AST  196<H>  /  ALT  188<H>  /  AlkPhos  64  09-21                          16.4   20.58 )-----------( 265      ( 21 Sep 2022 05:15 )             45.9     MEDICATIONS  (STANDING):  acetylcysteine 30 Gram(s),Dextrose 5% in Water 1000 milliLiter(s) 30 Gram(s) (100 mL/Hr) IV Continuous <Continuous>  chlorhexidine 2% Cloths 1 Application(s) Topical <User Schedule>  enoxaparin Injectable 40 milliGRAM(s) SubCutaneous every 24 hours  fomepizole IVPB 1200 milliGRAM(s) IV Intermittent every 12 hours    MEDICATIONS  (PRN):  sodium chloride 0.9% lock flush 10 milliLiter(s) IV Push every 1 hour PRN Pre/post blood products, medications, blood draw, and to maintain line patency                  
[facility] Division of Hospital Medicine    Chief Complaint: Acetaminophen toxicity    SUBJECTIVE / OVERNIGHT EVENTS: No acute events overnight. HD stable. He continues to endorse LT arm swelling likely 2/2 IV.     Patient denies chest pain, SOB, abd pain, N/V, fever, chills, dysuria or any other complaints. All remainder ROS negative.     MEDICATIONS  (STANDING):  acetylcysteine 30 Gram(s),Dextrose 5% in Water 1000 milliLiter(s) 30 Gram(s) (100 mL/Hr) IV Continuous <Continuous>  cefTRIAXone Injectable. 1000 milliGRAM(s) IV Push every 24 hours  chlorhexidine 2% Cloths 1 Application(s) Topical <User Schedule>  enoxaparin Injectable 40 milliGRAM(s) SubCutaneous every 24 hours  folic acid 1 milliGRAM(s) Oral daily  multivitamin 1 Tablet(s) Oral daily  potassium phosphate / sodium phosphate Tablet (K-PHOS No. 2) 1 Tablet(s) Oral four times a day  sodium chloride 0.9%. 1000 milliLiter(s) (75 mL/Hr) IV Continuous <Continuous>  thiamine 100 milliGRAM(s) Oral daily    MEDICATIONS  (PRN):  LORazepam     Tablet 1 milliGRAM(s) Oral every 6 hours PRN Agitation  LORazepam   Injectable 2 milliGRAM(s) IntraMuscular every 6 hours PRN severe agitation  sodium chloride 0.9% lock flush 10 milliLiter(s) IV Push every 1 hour PRN Pre/post blood products, medications, blood draw, and to maintain line patency        I&O's Summary    23 Sep 2022 07:01  -  24 Sep 2022 07:00  --------------------------------------------------------  IN: 0 mL / OUT: 900 mL / NET: -900 mL        PHYSICAL EXAM:  Vital Signs Last 24 Hrs  T(C): 36.9 (24 Sep 2022 08:00), Max: 36.9 (24 Sep 2022 08:00)  T(F): 98.4 (24 Sep 2022 08:00), Max: 98.4 (24 Sep 2022 08:00)  HR: 71 (24 Sep 2022 08:00) (66 - 77)  BP: 122/68 (24 Sep 2022 08:00) (122/64 - 147/83)  BP(mean): --  RR: 16 (24 Sep 2022 08:00) (16 - 19)  SpO2: 97% (24 Sep 2022 08:00) (97% - 98%)    Parameters below as of 24 Sep 2022 08:00  Patient On (Oxygen Delivery Method): room air            CONSTITUTIONAL: NAD, well-developed  RESPIRATORY: Normal respiratory effort; lungs are clear to auscultation bilaterally  CARDIOVASCULAR: Regular rate and rhythm, normal S1 and S2, no murmur/rub/gallop  ABDOMEN: Nontender to palpation, normoactive bowel sounds  MUSCULOSKELETAL:  LUE swelling noted likely 2/2 PIV. Site c/d/i and non tender  PSYCH: A+O to person, place, and time; affect appropriate  NEUROLOGY: CN 2-12 are intact and symmetric; no gross sensory deficits    LABS:                        11.3   3.86  )-----------( 117      ( 24 Sep 2022 07:01 )             32.5     09-24    142  |  105  |  7.6<L>  ----------------------------<  91  3.0<L>   |  24.0  |  0.51    Ca    8.6      24 Sep 2022 07:01  Phos  1.9     09-23  Mg     1.7     09-23    TPro  5.7<L>  /  Alb  3.3  /  TBili  1.0  /  DBili  0.3  /  AST  211<H>  /  ALT  1287<H>  /  AlkPhos  70  09-24    PT/INR - ( 24 Sep 2022 07:01 )   PT: 14.2 sec;   INR: 1.22 ratio           CARDIAC MARKERS ( 24 Sep 2022 07:01 )  x     / x     / 1679 U/L / x     / 3.8 ng/mL  CARDIAC MARKERS ( 23 Sep 2022 06:11 )  x     / x     / 4120 U/L / x     / 7.3 ng/mL  CARDIAC MARKERS ( 22 Sep 2022 15:31 )  x     / x     / 7685 U/L / x     / 18.9 ng/mL          CAPILLARY BLOOD GLUCOSE            RADIOLOGY & ADDITIONAL TESTS:  Results Reviewed:   Imaging Personally Reviewed:  Electrocardiogram Personally Reviewed:                                          
Patient is a 37y old  Male who presents with a chief complaint of Acetaminophen toxicity (20 Sep 2022 16:45)      BRIEF HOSPITAL COURSE:   37M PMH bipolar disorder who presented on 22 with AMS, unresponsive mental status, dark emesis with recent heavy EtOH intake. Per documentation patient has a h/o of taking multiple pills at once. No witnessed ingestion but multiple pill bottles found including Tylenol, valproate, olanzapine, oxycodone, gabapentin, and other OTCs were found near his person. Acetaminophen level > 600 and started on NAC infusion. Profound AG-metabolic acidosis. Intubated for airway protection. Developed shock state requiring IV vasopressor therapy. Dialysis initiated  given elevated serum osmolality, concern for co-ingestion.       PAST MEDICAL & SURGICAL HISTORY:  Anxiety      Post traumatic stress disorder (PTSD)      Bipolar disorder      Polysubstance abuse      No significant past surgical history      No significant past surgical history            Medications:    norepinephrine Infusion 0.05 MICROgram(s)/kG/Min IV Continuous <Continuous>      propofol Infusion 30 MICROgram(s)/kG/Min IV Continuous <Continuous>      enoxaparin Injectable 40 milliGRAM(s) SubCutaneous every 24 hours    pantoprazole  Injectable 40 milliGRAM(s) IV Push every 12 hours        sodium chloride 0.9% lock flush 10 milliLiter(s) IV Push every 1 hour PRN      chlorhexidine 0.12% Liquid 15 milliLiter(s) Oral Mucosa every 12 hours  chlorhexidine 2% Cloths 1 Application(s) Topical <User Schedule>  chlorhexidine 4% Liquid 1 Application(s) Topical <User Schedule>    acetylcysteine 30 Gram(s),Dextrose 5% in Water 1000 milliLiter(s) 30 Gram(s) IV Continuous <Continuous>  fomepizole IVPB 1200 milliGRAM(s) IV Intermittent every 12 hours      Mode: AC/ CMV (Assist Control/ Continuous Mandatory Ventilation)  RR (machine): 26  TV (machine): 450  FiO2: 40  PEEP: 5  MAP: 9  PIP: 18      ICU Vital Signs Last 24 Hrs  T(C): 37.6 (21 Sep 2022 00:35), Max: 37.6 (20 Sep 2022 23:45)  T(F): 99.7 (21 Sep 2022 00:35), Max: 99.7 (20 Sep 2022 23:45)  HR: 92 (21 Sep 2022 00:35) (61 - 116)  BP: 133/83 (21 Sep 2022 00:35) (88/58 - 153/87)  BP(mean): 101 (21 Sep 2022 00:30) (65 - 105)  ABP: --  ABP(mean): --  RR: 226 (21 Sep 2022 00:35) (14 - 415)  SpO2: 100% (21 Sep 2022 00:35) (96% - 100%)    O2 Parameters below as of 21 Sep 2022 00:35  Patient On (Oxygen Delivery Method): ventilator            ABG - ( 20 Sep 2022 16:38 )  pH, Arterial: 7.350 pH, Blood: x     /  pCO2: 23    /  pO2: 133   / HCO3: 13    / Base Excess: -12.9 /  SaO2: 100.0               I&O's Detail    20 Sep 2022 07:01  -  21 Sep 2022 01:22  --------------------------------------------------------  IN:    freetext medication - Infusion: 600 mL    Norepinephrine: 91.1 mL    Propofol: 145.7 mL  Total IN: 836.8 mL    OUT:    Indwelling Catheter - Urethral (mL): 1795 mL  Total OUT: 1795 mL    Total NET: -958.2 mL            LABS:                        17.1   22.02 )-----------( 306      ( 20 Sep 2022 13:17 )             49.3     09-20    141  |  111<H>  |  16.1  ----------------------------<  163<H>  3.9   |  13.0<L>  |  0.79    Ca    7.9<L>      20 Sep 2022 16:20  Mg     2.3     09-20    TPro  6.0<L>  /  Alb  3.8  /  TBili  1.2  /  DBili  x   /  AST  109<H>  /  ALT  112<H>  /  AlkPhos  61  09-20      CARDIAC MARKERS ( 20 Sep 2022 16:20 )  x     / x     / 2444 U/L / x     / 20.1 ng/mL  CARDIAC MARKERS ( 20 Sep 2022 13:17 )  x     / <0.01 ng/mL / 735 U/L / x     / 7.9 ng/mL      CAPILLARY BLOOD GLUCOSE      POCT Blood Glucose.: 190 mg/dL (20 Sep 2022 12:17)    PT/INR - ( 20 Sep 2022 16:20 )   PT: 16.3 sec;   INR: 1.40 ratio         PTT - ( 20 Sep 2022 16:20 )  PTT:23.5 sec  Urinalysis Basic - ( 20 Sep 2022 12:56 )    Color: Yellow / Appearance: Clear / S.025 / pH: x  Gluc: x / Ketone: Trace  / Bili: Negative / Urobili: Negative mg/dL   Blood: x / Protein: 100 mg/dL / Nitrite: Negative   Leuk Esterase: Negative / RBC: 3-5 /HPF / WBC 0-2 /HPF   Sq Epi: x / Non Sq Epi: Few / Bacteria: Few      CULTURES:  Rapid RVP Result: NotDetec ( @ 12:56)      Physical Examination:  GENERAL: In NAD, intubated, sedated  HEENT: NC/AT  NECK: Supple, trachea midline  PULM: CTA anteriorly  CVS: +S1, S2  ABD: Soft, non-tender  EXTREMITIES: No pedal edema B/L  SKIN: No open wounds  NEURO: Grossly non-focal    DEVICES:     RADIOLOGY:   < from: CT Head No Cont (22 @ 13:25) >    ACC: [id number] EXAM:  CT CERVICAL SPINE                        ACC: [id number] EXAM:  CT BRAIN                          PROCEDURE DATE:  2022          INTERPRETATION:  CLINICAL Indications:  AMS    COMPARISON: CT head dated 3/6/2019    TECHNIQUE: Noncontrast CT of the head. Multiplanar reformations are   submitted.    FINDINGS:  There is no compelling evidence for an acute transcortical infarction.   There is no evidence of mass, mass effect, midline shift or extra-axial   fluid collection. The lateral ventricles and cortical sulci are   age-appropriate in size and configuration. Chronic bilateral medial wall   orbital fracture deformities. Complete opacification the right maxillary   sinus. Mild inflammatory mucosal changes are seen throughout the various   portions of the paranasal sinuses. The orbits and mastoid air cells are   otherwise unremarkable. The calvarium is intact. Consider follow-up CT or   MRI as clinically warranted.    Moderate sized left frontal scalp soft tissue swelling/hematoma.      ============================    CLINICAL INDICATIONS: AMS    COMPARISON: None.    TECHNIQUE: Noncontrast CT of the cervical spine. Multiple contiguous   axial images through the cervical spine as well as multiplanar   reformatted images are submitted for interpretation without the   administration of intravenous contrast. 3-D images.    FINDINGS:  Moderate rightward curvature to the mid cervical spine.  There is no evidence for acute fracture. Mild reversal to the normal   lordosis is noted. Craniocervical junction is normal. The   cervicovertebral body heights and intervertebral disc spaces are   preserved. There is no prevertebral soft tissue abnormality. The odontoid   process is intact. The spinal canal and foramen are widely patent. There   is no evidence of significant disc herniation. Thyroid gland is   unremarkable. The airway is patent. Moderate left upper lobe airspace   disease on image 2040 of series 4. Consider dedicated chest x-ray or CT.    Evaluation of the individual levels:  C2/C3 level: No spinal canal stenosis or foraminal narrowing.  C3/C4 level: No spinal canal stenosis or foraminal narrowing.  C4/C5 level: No spinal canal stenosis or foraminal narrowing.  C5/C6 level: No spinal canal stenosis or foraminal narrowing.  C6/C7 level: No spinal canal stenosis or foraminal narrowing.  C7/T1 level: No spinal canal stenosis or foraminal narrowing.    IMPRESSION:    CT head: Moderate left frontal scalp soft tissue swelling/hematoma. No   evidence of an acute transcortical infarction or hemorrhage.    CT C-spine: No acute fracture dislocation of the cervical spine. Consider   MRI as clinically warranted. Left upper lobe airspace disease. Consider   chest x-ray or CT correlation.    --- End ofReport ---            AUTUMN RODAS MD; Attending Radiologist  This document has been electronically signed. Sep 20 2022  1:58PM    < end of copied text >

## 2022-09-27 NOTE — DIETITIAN INITIAL EVALUATION ADULT - ETIOLOGY
related to elevated LFTs			 related to acute liver failure 2/2 substance abuse related to likely energy intake exceeds energy expenditure

## 2022-09-27 NOTE — DIETITIAN INITIAL EVALUATION ADULT - PERTINENT MEDS FT
MEDICATIONS  (STANDING):  chlorhexidine 2% Cloths 1 Application(s) Topical <User Schedule>  enoxaparin Injectable 40 milliGRAM(s) SubCutaneous every 24 hours  folic acid 1 milliGRAM(s) Oral daily  multivitamin 1 Tablet(s) Oral daily  paliperidone ER. 3 milliGRAM(s) Oral daily  potassium chloride    Tablet ER 40 milliEquivalent(s) Oral daily  thiamine 100 milliGRAM(s) Oral daily    MEDICATIONS  (PRN):  LORazepam     Tablet 1 milliGRAM(s) Oral every 6 hours PRN Agitation  LORazepam   Injectable 2 milliGRAM(s) IntraMuscular every 6 hours PRN severe agitation  sodium chloride 0.9% lock flush 10 milliLiter(s) IV Push every 1 hour PRN Pre/post blood products, medications, blood draw, and to maintain line patency

## 2022-09-27 NOTE — DIETITIAN INITIAL EVALUATION ADULT - REASON
Pt visually well nourished and adequate PO intake	 No overt s/s of muscle/fat loss, no weight loss reported, generally good PO intake

## 2022-09-27 NOTE — BH CONSULTATION LIAISON PROGRESS NOTE - NSBHASSESSMENTFT_PSY_ALL_CORE
Overall the patient continues to not display any overt psychotic symptoms like delusions, hallucinations, or grossly disorganized speech or behaviour.  Now with a full and appropriately reactive affect.  Continues without any suicidal thoughts.    Parents feel comfortable with patient returning home.      -  The patient's parents gave some more insight into the specifics of the patient's struggle with mental health.  They said that the patient can have psychotic episodes comprised of auditory hallucinations and paranoia while concurrently exhibiting either manic or depressive symptomology.  They also said that he has displayed psychosis while not displaying any mood symptoms.  This presentation is more cogent to a Schizoaffective diagnosis than Bipolar 1 with psychotic features.    DDx:  -  Schizoaffective disorder, bipolar type  -  EtOH use disorder  -  R/O BP with psychotic features, schizophrenia    Plan:  -  Continue Paliperidone 3mg PO daily for history of psychosis  -  Recommend routine observation   -  Recommend Ativan 1mg PO Q6 PRN for anxiety or agitation, 2mg IM for sever agitation  -  Patient has f/u appointments at [facility]:  Therapist on 9/29/22 @ 5:45pm,  Psychiatrist on 10/4/22 @ 5:40pm

## 2022-09-27 NOTE — PROGRESS NOTE ADULT - REASON FOR ADMISSION
Acetaminophen toxicity

## 2022-09-27 NOTE — DIETITIAN INITIAL EVALUATION ADULT - SIGNS/SYMPTOMS
as evidenced by acute liver failure 2/2 substance abuse  as evidenced by elevated LFTs			 as evidenced by BMI 34.9

## 2022-09-27 NOTE — PROGRESS NOTE ADULT - PROVIDER SPECIALTY LIST ADULT
Hospitalist
Hospitalist
Nephrology
Toxicology (Non-Face-To-Face)
Hospitalist
Hospitalist
MICU
Nephrology
Nephrology
Hospitalist
Hospitalist
Nephrology

## 2022-09-27 NOTE — DIETITIAN INITIAL EVALUATION ADULT - OTHER INFO
37 y.o. M with psych hx since age 13, h/o PTSD, in the past diagnosed with bipolar with psychotic features, with h/o multiple admissions (6) and 2-3 Suicidal attempts, (cutting b/l wrists and dorsal aspect of left arm and by overdose on aspirin),last hospitalized in [facility] on 8/23/22 for paranoia and suicidal ideation but was evaluated by psych and deemed able to be discharged home. Pt presented with AMS/ unresponsiveness. Per hx by family recent heavy alcohol intake and found unresponsive with multiple pill bottles near him concerning for overdose (tylenol/ zyprexa/gabapentin/ oxycodone). In the ER Noted with elevated acetaminophen level >600 range, AG metabolic acidosis. Pt was intubated for airway protection and admitted to the MICU for acute metabolic encephalopathy secondary to polysubstance ingestion and tylenol toxicity. Pt went into shock thought to be secondary to sedatives and required pressor support. S/p activated charcoal and continued on NAC as well as fomepizole, nephro consulted and pt underwent emergent hemodialysis on 9/20. Pt was able to be extubated and weaned off of pressor support. Shiley cath was removed. Although tylenol level is down trended pts liver enzymes increased significantly. Pt deemed appropriate to be downgraded from the MICU to the hospitalist service.  37 y.o. M with psych hx since age 13, h/o PTSD, in the past diagnosed with bipolar with psychotic features, with h/o multiple admissions and 2-3 Suicidal attempts, last hospitalized in [facility] on 8/23/22 for paranoia and suicidal ideation. On admission, Pt presented with AMS/unresponsiveness. Per hx by family recent heavy alcohol intake and found unresponsive with multiple pill bottles near him concerning for overdose (tylenol/ zyprexa/gabapentin/ oxycodone). In the ER Noted with elevated acetaminophen level >600 range, AG metabolic acidosis. Pt was intubated for airway protection and admitted to the MICU for acute metabolic encephalopathy secondary to polysubstance ingestion and tylenol toxicity. Pt went into shock thought to be secondary to sedatives and required pressor support. S/p activated charcoal and continued on NAC as well as fomepizole, nephro consulted and pt underwent emergent hemodialysis on 9/20. Pt was able to be extubated (9/21). Pt pending improved LFTs, plan for placement to inpatient psych.

## 2022-09-27 NOTE — DIETITIAN INITIAL EVALUATION ADULT - ORAL INTAKE PTA/DIET HISTORY
Pt reports decreased appetite prior to admissions, during admissions adequate appetite and PO intake. Pt reports UBW to be 215-220lbs, recorded wt on admissions is 230lbs. Pt does not follow a specific diet at home. Reports frequent BM during the day. Discussed increasing fiber through foods such as fruits to decrease BM frequency. Pt reports decreased appetite PTA, improving in house completing >75% of meals. Pt reports not following a specific diet at home. Pt reports UBW to be 215-220lbs, admission weight 230lbs, fluctuating edema noted, will continue to monitor. Pt reports increased frequency of BM in house, normal consistency. Pt declined nutrition education at this time.

## 2022-09-27 NOTE — BH CONSULTATION LIAISON PROGRESS NOTE - NSBHATTESTCOMMENTATTENDFT_PSY_A_CORE
agree with above.   Patient started on Invega with good response and no reported adverse effect. discussed with Sw and primary team, will reassess patient tomorrow with likely plan to clear for DC and f/u with [facility] thursday 
agree with above

## 2022-09-27 NOTE — PROGRESS NOTE ADULT - ASSESSMENT
37 y.o. M with psych hx since age 13, h/o PTSD, in the past diagnosed with bipolar with psychotic features, with h/o multiple admissions (6) and 2-3 Suicidal attempts, (cutting b/l wrists and dorsal aspect of left arm and by overdose on aspirin),last hospitalized in [facility] on 8/23/22 for paranoia and suicidal ideation but was evaluated by psych and deemed able to be discharged home. Pt presented with AMS/ unresponsiveness. Per hx by family recent heavy alcohol intake and found unresponsive with multiple pill bottles near him concerning for overdose (tylenol/ zyprexa/gabapentin/ oxycodone). In the ER Noted with elevated acetaminophen level >600 range, AG metabolic acidosis. Pt was intubated for airway protection and admitted to the MICU for acute metabolic encephalopathy secondary to polysubstance ingestion and tylenol toxicity. Pt went into shock thought to be secondary to sedatives and required pressor support. S/p activated charcoal and continued on NAC as well as fomepizole, nephro consulted and pt underwent emergent hemodialysis on 9/20. Pt was able to be extubated and weaned off of pressor support. Shiley cath was removed. Although tylenol level is down trended pts liver enzymes increased significantly. Pt deemed appropriate to be downgraded from the MICU to the hospitalist service.     Acute metabolic encephalopathy secondary to poly substance abuse   underlying hx Bipolar DO (severe depression)/ suicidal ideation/ attempts and PTSD   s/p intubation for airway protection on 9/20 and extubated 9/21   s/p pressor support secondary to shock from sedatives   U tox positive for THZ, lithium and salicylate negative   Hold zyprexa for now   Hold all home Psych meds for now   c/w constant observation 1:1 for suicidal ideation   Psych consult appreciated, started on Ativan PRN   - Appreciate psych recs- C/w Invega     Tylenol toxicity   Tylenol level currently wnl   AST/ALT trending down   INR now trending down   Discussed with toxicology- S/p NAC protocol given LFTs downtrending.   Trend LFTs/ tylenol/coags and CMP  Hold hepatotoxic agents    Acute liver failure  Hepatology consult appreciated   Trend LFTs  Work up sent per Hepatology   CT A/P triphasic reviewed: no acute liver pathology     Rhabdomyolysis- downtrending  likely secondary to unclear down time   trending down   nephro f/u noted and appreciated     Hypokalemia  - started potassium chloride 40 mg QD    Aspiration pna  Leukocytosis improving   CT Chest: Consolidation of the lingula and patchy opacities within left upper lobe likely representing pneumonia  S/p Rocephin   BCx with NGTD    UA negative - f/u U cx     Alcohol abuse  Alcohol level <10   Completed ciwa   unknown last drink and pt s/p sedative weaning   c/w thiamine, folic acid and MVI      Hyperglycemia   Hba1c 5.6   Improving     Hypertension   likely from anxiety/ agitation   c/w monitoring BP      DVT PPx - c/w Lovenox SQ      Activity level: Increase as tolerated    Dispo: Pending dispo to inpatient psych rehab. Patient will need ALT close to baseline to be accepted by rehab. If not downtrended tomorrow to desired goal, will likely discharge home tomorrow 9/28

## 2022-09-27 NOTE — DIETITIAN INITIAL EVALUATION ADULT - NUTRITION CONSULT
no
Implemented All Universal Safety Interventions:  [place] to call system. Call bell, personal items and telephone within reach. Instruct patient to call for assistance. Room bathroom lighting operational. Non-slip footwear when patient is off stretcher. Physically safe environment: no spills, clutter or unnecessary equipment. Stretcher in lowest position, wheels locked, appropriate side rails in place.

## 2022-09-28 ENCOUNTER — TRANSCRIPTION ENCOUNTER (OUTPATIENT)
Age: 37
End: 2022-09-28

## 2022-09-28 VITALS
SYSTOLIC BLOOD PRESSURE: 132 MMHG | OXYGEN SATURATION: 97 % | TEMPERATURE: 98 F | RESPIRATION RATE: 18 BRPM | HEART RATE: 81 BPM | DIASTOLIC BLOOD PRESSURE: 85 MMHG

## 2022-09-28 LAB
ALBUMIN SERPL ELPH-MCNC: 3.4 G/DL — SIGNIFICANT CHANGE UP (ref 3.3–5.2)
ALP SERPL-CCNC: 65 U/L — SIGNIFICANT CHANGE UP (ref 40–120)
ALT FLD-CCNC: 372 U/L — HIGH
ANION GAP SERPL CALC-SCNC: 8 MMOL/L — SIGNIFICANT CHANGE UP (ref 5–17)
AST SERPL-CCNC: 35 U/L — SIGNIFICANT CHANGE UP
BILIRUB SERPL-MCNC: 0.5 MG/DL — SIGNIFICANT CHANGE UP (ref 0.4–2)
BUN SERPL-MCNC: 11.8 MG/DL — SIGNIFICANT CHANGE UP (ref 8–20)
CALCIUM SERPL-MCNC: 8.6 MG/DL — SIGNIFICANT CHANGE UP (ref 8.4–10.5)
CHLORIDE SERPL-SCNC: 104 MMOL/L — SIGNIFICANT CHANGE UP (ref 98–107)
CO2 SERPL-SCNC: 28 MMOL/L — SIGNIFICANT CHANGE UP (ref 22–29)
CREAT SERPL-MCNC: 0.67 MG/DL — SIGNIFICANT CHANGE UP (ref 0.5–1.3)
EGFR: 123 ML/MIN/1.73M2 — SIGNIFICANT CHANGE UP
GLUCOSE SERPL-MCNC: 134 MG/DL — HIGH (ref 70–99)
HSV1 AB FLD QL: NEGATIVE — SIGNIFICANT CHANGE UP
HSV2 AB FLD-ACNC: NEGATIVE — SIGNIFICANT CHANGE UP
POTASSIUM SERPL-MCNC: 3.8 MMOL/L — SIGNIFICANT CHANGE UP (ref 3.5–5.3)
POTASSIUM SERPL-SCNC: 3.8 MMOL/L — SIGNIFICANT CHANGE UP (ref 3.5–5.3)
PROT SERPL-MCNC: 5.8 G/DL — LOW (ref 6.6–8.7)
SARS-COV-2 RNA SPEC QL NAA+PROBE: SIGNIFICANT CHANGE UP
SODIUM SERPL-SCNC: 140 MMOL/L — SIGNIFICANT CHANGE UP (ref 135–145)

## 2022-09-28 PROCEDURE — 83935 ASSAY OF URINE OSMOLALITY: CPT

## 2022-09-28 PROCEDURE — 31500 INSERT EMERGENCY AIRWAY: CPT

## 2022-09-28 PROCEDURE — 86850 RBC ANTIBODY SCREEN: CPT

## 2022-09-28 PROCEDURE — 87641 MR-STAPH DNA AMP PROBE: CPT

## 2022-09-28 PROCEDURE — U0005: CPT

## 2022-09-28 PROCEDURE — 84295 ASSAY OF SERUM SODIUM: CPT

## 2022-09-28 PROCEDURE — 0225U NFCT DS DNA&RNA 21 SARSCOV2: CPT

## 2022-09-28 PROCEDURE — 84484 ASSAY OF TROPONIN QUANT: CPT

## 2022-09-28 PROCEDURE — 87798 DETECT AGENT NOS DNA AMP: CPT

## 2022-09-28 PROCEDURE — 82390 ASSAY OF CERULOPLASMIN: CPT

## 2022-09-28 PROCEDURE — 85014 HEMATOCRIT: CPT

## 2022-09-28 PROCEDURE — 82330 ASSAY OF CALCIUM: CPT

## 2022-09-28 PROCEDURE — 83036 HEMOGLOBIN GLYCOSYLATED A1C: CPT

## 2022-09-28 PROCEDURE — 80307 DRUG TEST PRSMV CHEM ANLYZR: CPT

## 2022-09-28 PROCEDURE — 82784 ASSAY IGA/IGD/IGG/IGM EACH: CPT

## 2022-09-28 PROCEDURE — 70450 CT HEAD/BRAIN W/O DYE: CPT | Mod: MA

## 2022-09-28 PROCEDURE — 86038 ANTINUCLEAR ANTIBODIES: CPT

## 2022-09-28 PROCEDURE — 81001 URINALYSIS AUTO W/SCOPE: CPT

## 2022-09-28 PROCEDURE — 86790 VIRUS ANTIBODY NOS: CPT

## 2022-09-28 PROCEDURE — 82150 ASSAY OF AMYLASE: CPT

## 2022-09-28 PROCEDURE — 84540 ASSAY OF URINE/UREA-N: CPT

## 2022-09-28 PROCEDURE — 82550 ASSAY OF CK (CPK): CPT

## 2022-09-28 PROCEDURE — 84300 ASSAY OF URINE SODIUM: CPT

## 2022-09-28 PROCEDURE — 82140 ASSAY OF AMMONIA: CPT

## 2022-09-28 PROCEDURE — 87340 HEPATITIS B SURFACE AG IA: CPT

## 2022-09-28 PROCEDURE — 82947 ASSAY GLUCOSE BLOOD QUANT: CPT

## 2022-09-28 PROCEDURE — 84443 ASSAY THYROID STIM HORMONE: CPT

## 2022-09-28 PROCEDURE — 83735 ASSAY OF MAGNESIUM: CPT

## 2022-09-28 PROCEDURE — 99261: CPT

## 2022-09-28 PROCEDURE — 86695 HERPES SIMPLEX TYPE 1 TEST: CPT

## 2022-09-28 PROCEDURE — 82553 CREATINE MB FRACTION: CPT

## 2022-09-28 PROCEDURE — 83605 ASSAY OF LACTIC ACID: CPT

## 2022-09-28 PROCEDURE — 83520 IMMUNOASSAY QUANT NOS NONAB: CPT

## 2022-09-28 PROCEDURE — 80053 COMPREHEN METABOLIC PANEL: CPT

## 2022-09-28 PROCEDURE — 82435 ASSAY OF BLOOD CHLORIDE: CPT

## 2022-09-28 PROCEDURE — 86255 FLUORESCENT ANTIBODY SCREEN: CPT

## 2022-09-28 PROCEDURE — 84436 ASSAY OF TOTAL THYROXINE: CPT

## 2022-09-28 PROCEDURE — 80048 BASIC METABOLIC PNL TOTAL CA: CPT

## 2022-09-28 PROCEDURE — 86663 EPSTEIN-BARR ANTIBODY: CPT

## 2022-09-28 PROCEDURE — G0480: CPT

## 2022-09-28 PROCEDURE — 94003 VENT MGMT INPAT SUBQ DAY: CPT

## 2022-09-28 PROCEDURE — 84132 ASSAY OF SERUM POTASSIUM: CPT

## 2022-09-28 PROCEDURE — 87040 BLOOD CULTURE FOR BACTERIA: CPT

## 2022-09-28 PROCEDURE — 84480 ASSAY TRIIODOTHYRONINE (T3): CPT

## 2022-09-28 PROCEDURE — 80178 ASSAY OF LITHIUM: CPT

## 2022-09-28 PROCEDURE — 74177 CT ABD & PELVIS W/CONTRAST: CPT

## 2022-09-28 PROCEDURE — 99233 SBSQ HOSP IP/OBS HIGH 50: CPT

## 2022-09-28 PROCEDURE — 96375 TX/PRO/DX INJ NEW DRUG ADDON: CPT

## 2022-09-28 PROCEDURE — C1751: CPT

## 2022-09-28 PROCEDURE — 82962 GLUCOSE BLOOD TEST: CPT

## 2022-09-28 PROCEDURE — 86704 HEP B CORE ANTIBODY TOTAL: CPT

## 2022-09-28 PROCEDURE — 71250 CT THORAX DX C-: CPT

## 2022-09-28 PROCEDURE — 87529 HSV DNA AMP PROBE: CPT

## 2022-09-28 PROCEDURE — 36556 INSERT NON-TUNNEL CV CATH: CPT

## 2022-09-28 PROCEDURE — 71045 X-RAY EXAM CHEST 1 VIEW: CPT

## 2022-09-28 PROCEDURE — 96365 THER/PROPH/DIAG IV INF INIT: CPT | Mod: XU

## 2022-09-28 PROCEDURE — 82010 KETONE BODYS QUAN: CPT

## 2022-09-28 PROCEDURE — 84100 ASSAY OF PHOSPHORUS: CPT

## 2022-09-28 PROCEDURE — 87640 STAPH A DNA AMP PROBE: CPT

## 2022-09-28 PROCEDURE — 84156 ASSAY OF PROTEIN URINE: CPT

## 2022-09-28 PROCEDURE — 80076 HEPATIC FUNCTION PANEL: CPT

## 2022-09-28 PROCEDURE — 85730 THROMBOPLASTIN TIME PARTIAL: CPT

## 2022-09-28 PROCEDURE — 85018 HEMOGLOBIN: CPT

## 2022-09-28 PROCEDURE — 99239 HOSP IP/OBS DSCHRG MGMT >30: CPT

## 2022-09-28 PROCEDURE — 86665 EPSTEIN-BARR CAPSID VCA: CPT

## 2022-09-28 PROCEDURE — 86645 CMV ANTIBODY IGM: CPT

## 2022-09-28 PROCEDURE — 86703 HIV-1/HIV-2 1 RESULT ANTBDY: CPT

## 2022-09-28 PROCEDURE — 86901 BLOOD TYPING SEROLOGIC RH(D): CPT

## 2022-09-28 PROCEDURE — U0003: CPT

## 2022-09-28 PROCEDURE — 83690 ASSAY OF LIPASE: CPT

## 2022-09-28 PROCEDURE — 99291 CRITICAL CARE FIRST HOUR: CPT | Mod: 25

## 2022-09-28 PROCEDURE — 86900 BLOOD TYPING SEROLOGIC ABO: CPT

## 2022-09-28 PROCEDURE — 36415 COLL VENOUS BLD VENIPUNCTURE: CPT

## 2022-09-28 PROCEDURE — 82570 ASSAY OF URINE CREATININE: CPT

## 2022-09-28 PROCEDURE — 87086 URINE CULTURE/COLONY COUNT: CPT

## 2022-09-28 PROCEDURE — 87799 DETECT AGENT NOS DNA QUANT: CPT

## 2022-09-28 PROCEDURE — 83874 ASSAY OF MYOGLOBIN: CPT

## 2022-09-28 PROCEDURE — 86706 HEP B SURFACE ANTIBODY: CPT

## 2022-09-28 PROCEDURE — 82803 BLOOD GASES ANY COMBINATION: CPT

## 2022-09-28 PROCEDURE — 86787 VARICELLA-ZOSTER ANTIBODY: CPT

## 2022-09-28 PROCEDURE — 85610 PROTHROMBIN TIME: CPT

## 2022-09-28 PROCEDURE — 85027 COMPLETE CBC AUTOMATED: CPT

## 2022-09-28 PROCEDURE — 83930 ASSAY OF BLOOD OSMOLALITY: CPT

## 2022-09-28 PROCEDURE — 86664 EPSTEIN-BARR NUCLEAR ANTIGEN: CPT

## 2022-09-28 PROCEDURE — 94760 N-INVAS EAR/PLS OXIMETRY 1: CPT

## 2022-09-28 PROCEDURE — 86803 HEPATITIS C AB TEST: CPT

## 2022-09-28 PROCEDURE — 72125 CT NECK SPINE W/O DYE: CPT | Mod: MA

## 2022-09-28 PROCEDURE — 94002 VENT MGMT INPAT INIT DAY: CPT

## 2022-09-28 PROCEDURE — 85025 COMPLETE CBC W/AUTO DIFF WBC: CPT

## 2022-09-28 RX ORDER — PALIPERIDONE 1.5 MG/1
1 TABLET, EXTENDED RELEASE ORAL
Qty: 14 | Refills: 0
Start: 2022-09-28 | End: 2022-10-11

## 2022-09-28 RX ORDER — THIAMINE MONONITRATE (VIT B1) 100 MG
1 TABLET ORAL
Qty: 0 | Refills: 0 | DISCHARGE
Start: 2022-09-28

## 2022-09-28 RX ORDER — CLONAZEPAM 1 MG
1 TABLET ORAL
Qty: 0 | Refills: 0 | DISCHARGE

## 2022-09-28 RX ORDER — GABAPENTIN 400 MG/1
1 CAPSULE ORAL
Qty: 0 | Refills: 0 | DISCHARGE

## 2022-09-28 RX ORDER — FOLIC ACID 0.8 MG
1 TABLET ORAL
Qty: 0 | Refills: 0 | DISCHARGE
Start: 2022-09-28

## 2022-09-28 RX ORDER — BENZTROPINE MESYLATE 1 MG
1 TABLET ORAL
Qty: 0 | Refills: 0 | DISCHARGE

## 2022-09-28 RX ORDER — OLANZAPINE 15 MG/1
1 TABLET, FILM COATED ORAL
Qty: 0 | Refills: 0 | DISCHARGE

## 2022-09-28 RX ADMIN — Medication 40 MILLIEQUIVALENT(S): at 11:37

## 2022-09-28 RX ADMIN — Medication 100 MILLIGRAM(S): at 11:37

## 2022-09-28 RX ADMIN — PALIPERIDONE 3 MILLIGRAM(S): 1.5 TABLET, EXTENDED RELEASE ORAL at 11:37

## 2022-09-28 RX ADMIN — Medication 1 MILLIGRAM(S): at 11:37

## 2022-09-28 RX ADMIN — CHLORHEXIDINE GLUCONATE 1 APPLICATION(S): 213 SOLUTION TOPICAL at 05:31

## 2022-09-28 RX ADMIN — Medication 1 TABLET(S): at 11:37

## 2022-09-28 NOTE — BH CONSULTATION LIAISON PROGRESS NOTE - NSBHCHARTREVIEWVS_PSY_A_CORE FT
Vital Signs Last 24 Hrs  T(C): 36.5 (27 Sep 2022 13:55), Max: 36.5 (27 Sep 2022 13:55)  T(F): 97.7 (27 Sep 2022 13:55), Max: 97.7 (27 Sep 2022 13:55)  HR: 78 (27 Sep 2022 13:55) (63 - 93)  BP: 126/88 (27 Sep 2022 13:55) (125/79 - 136/88)  BP(mean): --  RR: 18 (27 Sep 2022 13:55) (18 - 18)  SpO2: 97% (27 Sep 2022 13:55) (95% - 97%)    Parameters below as of 27 Sep 2022 12:50  Patient On (Oxygen Delivery Method): room air    
Vital Signs Last 24 Hrs  T(C): 36.8 (28 Sep 2022 13:55), Max: 36.8 (28 Sep 2022 11:00)  T(F): 98.2 (28 Sep 2022 13:55), Max: 98.2 (28 Sep 2022 11:00)  HR: 81 (28 Sep 2022 13:55) (68 - 87)  BP: 132/85 (28 Sep 2022 13:55) (100/65 - 152/87)  BP(mean): --  RR: 18 (28 Sep 2022 13:55) (18 - 21)  SpO2: 97% (28 Sep 2022 13:55) (93% - 97%)    Parameters below as of 28 Sep 2022 13:55  Patient On (Oxygen Delivery Method): room air    
Vital Signs Last 24 Hrs  T(C): 36.8 (23 Sep 2022 16:39), Max: 37.4 (23 Sep 2022 05:11)  T(F): 98.2 (23 Sep 2022 16:39), Max: 99.4 (23 Sep 2022 05:11)  HR: 66 (23 Sep 2022 16:39) (66 - 83)  BP: 122/64 (23 Sep 2022 16:39) (110/71 - 130/74)  BP(mean): --  RR: 19 (23 Sep 2022 16:39) (18 - 19)  SpO2: 97% (23 Sep 2022 16:39) (97% - 98%)    Parameters below as of 23 Sep 2022 16:39  Patient On (Oxygen Delivery Method): room air    
Vital Signs Last 24 Hrs  T(C): 37.1 (26 Sep 2022 05:04), Max: 37.1 (26 Sep 2022 05:04)  T(F): 98.8 (26 Sep 2022 05:04), Max: 98.8 (26 Sep 2022 05:04)  HR: 73 (26 Sep 2022 05:04) (73 - 81)  BP: 155/84 (26 Sep 2022 05:04) (130/70 - 155/84)  BP(mean): --  RR: 18 (26 Sep 2022 05:04) (16 - 18)  SpO2: 97% (25 Sep 2022 15:30) (97% - 97%)    Parameters below as of 25 Sep 2022 15:30  Patient On (Oxygen Delivery Method): room air

## 2022-09-28 NOTE — BH CONSULTATION LIAISON PROGRESS NOTE - NSBHATTESTTYPEVISIT_PSY_A_CORE
Attending with Resident/Fellow/Student
Attending Only
Attending with Resident/Fellow/Student
Attending with Resident/Fellow/Student

## 2022-09-28 NOTE — BH CONSULTATION LIAISON PROGRESS NOTE - NSBHFUPINTERVALHXFT_PSY_A_CORE
On my previous days visit with the patient he affected an innocent /naive / childlike demeanor.  Today he sounded more assured and confidant.  More adult. The patient however still demonstrates poor insight as he continues to minimize the gravity of his actions and denies any previous or current suicidal ideation or attempts.  Denies any current AVH.    The patient's parents gave some more insight into the specifics of the patient's struggle with mental health.  They said that the patient can have psychotic episodes comprised of auditory hallucinations and paranoia while concurrently exhibiting either manic or depressive symptomology.  They also said that he has displayed psychosis while not displaying any mood symptoms.  This presentation is more cogent to a Schizoaffective diagnosis than Bipolar 1 with psychotic features.
No overnight complaints, no reported outbursts, not needing PRNs.  Eating and sleeping well.  Easy to engage.  A little blunted but still with a mostly full affect.  AOx3, thoughts essentially clear, logical and linear.  No reports of AVH.  No suicidal ideation or homicidal ideation.    Discussed the utility of starting a trial of oral paliperidone with the possibility of converting to Q monthly Invega Sustenna with the patient.  Of note he says that he had tried Risperidone in the past but said it was discontinued 2/2 to gynecomastia.  He said however that he only tired it for 3 days.  He agreed to try 3mg PO paliperidone daily.
Patient seen for follow up and found to be calm, cooperative and pleasant. Patient states he spoke to his parents who are planning on picking him up today. Patient states he feels much better, has been taking invega with no difficulty and denies any s/h ideation as well as any AVH and no delusions elicited.   Unit staff report patient has been calm, pleasant and med compliant with no behavioral problems.   Collateral taken from family yesterday who agree patient is doing well, back to baseline.   
No reports of outbursts overnight.  Not requiring any PRNs.  Eating and sleeping well.  No reports of medication side effects.  Overall the patient appears euthymic.  Has a full and appropriately reactive affect.  Endorses a good mood.  Denies any suicidal ideation or homicidal ideation.  Denies having any AVH since admit.      I spoke with both the patient and his mom and dad in the patient's room.  Of note, the family says that they have not seen the patient look this happy in months.  They said that they were comfortable with the patient being discharged home.  The patient did become a little irritable when discussing going back home to his parents - accused them of not taking care of pest problems, suggested that they were the ones buying EtOH and mixing it with meds.  However, patient was redirectable and was open to the idea that on reflection, those thoughts could have been distorted secondary to his decompensated psychiatric state.      The patient stated that he will continue to take his prescribed meds and will abstain from EtOH and smoking as he knows these are bad for him.  He says that he will also follow up with his psychiatrist, Dr. Jose Luis Aldana for med management and with his therapist at [facility].

## 2022-09-28 NOTE — DISCHARGE NOTE NURSING/CASE MANAGEMENT/SOCIAL WORK - NSDCVIVACCINE_GEN_ALL_CORE_FT
Tdap; 06-Mar-2019 16:10; Cyndee Ron (REUBEN); [employer]; [id number] (Exp. Date: 26-Feb-2021); IntraMuscular; Deltoid Right.; 0.5 milliLiter(s); VIS (VIS Published: 09-May-2013, VIS Presented: 06-Mar-2019);

## 2022-09-28 NOTE — DISCHARGE NOTE PROVIDER - ATTENDING DISCHARGE PHYSICAL EXAMINATION:
Vital Signs Last 24 Hrs  T(F): 98.2 (28 Sep 2022 11:00), Max: 98.2 (28 Sep 2022 11:00)  HR: 80 (28 Sep 2022 11:00) (68 - 87)  BP: 129/87 (28 Sep 2022 11:00) (100/65 - 152/87)  RR: 18 (28 Sep 2022 11:00) (18 - 21)  SpO2: 97% (28 Sep 2022 11:00) (93% - 97%)    Physical Exam:  Constitutional: NAD, awake and alert, well-developed  Neck: Soft and supple, No LAD, No JVD  Respiratory: cta b/l no wheezing no rhonchi  Cardiovascular: +s1/s2 no edema b/l le  Gastrointestinal: soft nt nd bs+  Vascular: 2+ peripheral pulses  Neurological: A/O x 3, no focal deficits

## 2022-09-28 NOTE — BH CONSULTATION LIAISON PROGRESS NOTE - NSBHASSESSMENTFT_PSY_ALL_CORE
Overall the patient continues to not display any overt psychotic symptoms like delusions, hallucinations, or grossly disorganized speech or behaviour.  Now with a full and appropriately reactive affect.  Continues without any suicidal thoughts.    Parents feel comfortable with patient returning home.      -  The patient's parents gave some more insight into the specifics of the patient's struggle with mental health.  They said that the patient can have psychotic episodes comprised of auditory hallucinations and paranoia while concurrently exhibiting either manic or depressive symptomology.  They also said that he has displayed psychosis while not displaying any mood symptoms.  This presentation is more cogent to a Schizoaffective diagnosis than Bipolar 1 with psychotic features.    9/28: patient seen again today and found to be calm, cooperative. linear with no s/h ideation or AVH. Discussed with pts family and staff who reports patient has been calm, cooperative and with no behavioral problems.   Patient psychiatrically cleared with a plan to return home to his parents and f/u with [facility]     DDx:  -  Schizoaffective disorder, bipolar type  -  EtOH use disorder  -  R/O BP with psychotic features, schizophrenia    Plan:  -  Continue Paliperidone 3mg PO daily for history of psychosis  -  Recommend routine observation   -  Recommend Ativan 1mg PO Q6 PRN for anxiety or agitation, 2mg IM for sever agitation  -  Patient has f/u appointments at [facility]:  Therapist on 9/29/22 @ 5:45pm,  Psychiatrist on 10/4/22 @ 5:40pm

## 2022-09-28 NOTE — DISCHARGE NOTE NURSING/CASE MANAGEMENT/SOCIAL WORK - NSDCPEFALRISK_GEN_ALL_CORE
For information on Fall & Injury Prevention, visit: [email or web address] OR  [email or web address] OR  https://www.cdc.gov/steadi/patient.html

## 2022-09-28 NOTE — BH CONSULTATION LIAISON PROGRESS NOTE - NSBHMSEMOVE_PSY_A_CORE
No abnormal movements
No abnormal movements
Patient likes to take walks in the [place]./No abnormal movements
No abnormal movements

## 2022-09-28 NOTE — BH CONSULTATION LIAISON PROGRESS NOTE - NSICDXBHTERTIARYDX_PSY_ALL_CORE
R/O Severe bipolar affective disorder with psychosis   F31.89  R/O Schizophrenia   F20.9  

## 2022-09-28 NOTE — BH CONSULTATION LIAISON PROGRESS NOTE - NSICDXBHSECONDARYDX_PSY_ALL_CORE
Mild alcohol use disorder   F10.10  

## 2022-09-28 NOTE — DISCHARGE NOTE PROVIDER - NSDCCPCAREPLAN_GEN_ALL_CORE_FT
PRINCIPAL DISCHARGE DIAGNOSIS  Diagnosis: Metabolic encephalopathy  Assessment and Plan of Treatment: -Secondary to poly substance abuse   -Now resolved      SECONDARY DISCHARGE DIAGNOSES  Diagnosis: High anion gap metabolic acidosis  Assessment and Plan of Treatment: -Resolved    Diagnosis: Acute respiratory failure with hypoxia  Assessment and Plan of Treatment: -Resolved    Diagnosis: Acute liver failure  Assessment and Plan of Treatment: -Due to tylenol overdose  -Outpatient hepatology follow up on discharge    Diagnosis: Bipolar disorder  Assessment and Plan of Treatment: -Continue antipsychotics as prescribed  -Outpatient psych follow up on discharge  -  Patient has f/u appointments at [facility]. Therapist on 9/29/22 @ 5:45pm     PRINCIPAL DISCHARGE DIAGNOSIS  Diagnosis: Metabolic encephalopathy  Assessment and Plan of Treatment: -Secondary to poly substance abuse   -Now resolved      SECONDARY DISCHARGE DIAGNOSES  Diagnosis: High anion gap metabolic acidosis  Assessment and Plan of Treatment: -Resolved    Diagnosis: Acute respiratory failure with hypoxia  Assessment and Plan of Treatment: -Resolved    Diagnosis: Acute liver failure  Assessment and Plan of Treatment: -Due to tylenol overdose  -Outpatient hepatology follow up on discharge    Diagnosis: Bipolar disorder  Assessment and Plan of Treatment: - Please continue taking Invega, as prescribed.  -Outpatient psych follow up on discharge  -  Patient has f/u appointments at [facility]. Therapist on 9/29/22 @ 5:45pm

## 2022-09-28 NOTE — DISCHARGE NOTE PROVIDER - HOSPITAL COURSE
37 y.o. M with psych hx since age 13, h/o PTSD, in the past diagnosed with bipolar with psychotic features, with h/o multiple admissions (6) and 2-3 Suicidal attempts, (cutting b/l wrists and dorsal aspect of left arm and by overdose on aspirin), last hospitalized in [facility] on 8/23/22 for paranoia and suicidal ideation but was evaluated by psych and deemed able to be discharged home. Pt presented with AMS/ unresponsiveness. Per hx by family recent heavy alcohol intake and found unresponsive with multiple pill bottles near him concerning for overdose (tylenol/ zyprexa/gabapentin/ oxycodone). In the ER Noted with elevated acetaminophen level >600 range, AG metabolic acidosis. Pt was intubated for airway protection and admitted to the MICU for acute metabolic encephalopathy secondary to polysubstance ingestion and tylenol toxicity.  Utox positive for THZ, lithium and salicyclate negative. Pt went into shock thought to be secondary to sedatives and required pressor support. S/p activated charcoal and continued on NAC as well as fomepizole, nephro consulted and pt underwent emergent hemodialysis on 9/20. Pt was able to be extubated and weaned off of pressor support. Shiley cath was removed. Although Tylenol and LFTS improved. Pt deemed appropriate to be downgraded from the MICU to the hospitalist service. Pt was kept on constant observation for SI. Psych ws consulted, pt was continued on Invega and ativan PRN. Pt was deemed stable for DC home.          37 y.o. M with psych hx since age 13, h/o PTSD, in the past diagnosed with bipolar with psychotic features, with h/o multiple admissions (6) and 2-3 Suicidal attempts, (cutting b/l wrists and dorsal aspect of left arm and by overdose on aspirin), last hospitalized in [facility] on 8/23/22 for paranoia and suicidal ideation but was evaluated by psych and deemed able to be discharged home. Pt presented with AMS/ unresponsiveness. Per hx by family recent heavy alcohol intake and found unresponsive with multiple pill bottles near him concerning for overdose (tylenol/ zyprexa/gabapentin/ oxycodone). In the ER Noted with elevated acetaminophen level >600 range, AG metabolic acidosis. Pt was intubated for airway protection and admitted to the MICU for acute metabolic encephalopathy secondary to polysubstance ingestion and tylenol toxicity.  Utox positive for THZ, lithium and salicyclate negative. Pt went into shock thought to be secondary to sedatives and required pressor support. S/p activated charcoal and continued on NAC as well as fomepizole, nephro consulted and pt underwent emergent hemodialysis on 9/20. Pt was able to be extubated and weaned off of pressor support. Shiley cath was removed. Although Tylenol and LFTS improved. Pt deemed appropriate to be downgraded from the MICU to the hospitalist service. Pt was kept on constant observation for SI. Psych ws consulted, pt was continued on Invega. Pt was deemed stable for DC home.

## 2022-09-28 NOTE — BH CONSULTATION LIAISON PROGRESS NOTE - NSBHFUPREASONCONS_PSY_A_CORE
suicidality/psychosis
suicidality/psychosis/med management

## 2022-09-28 NOTE — BH CONSULTATION LIAISON PROGRESS NOTE - NSBHMSERECMEM_PSY_A_CORE
still with amnesia surrounding the events that led to this hospitalization./Impaired
Normal

## 2022-09-28 NOTE — BH CONSULTATION LIAISON PROGRESS NOTE - NSBHPROGSUIC_PSY_A_CORE
Sma, nips, 20 wk us pete.   See if there is any chance you can find CF screening or documentation of testing done in original ob records G1
no

## 2022-09-28 NOTE — BH CONSULTATION LIAISON PROGRESS NOTE - NSBHFUPINTERVALCCFT_PSY_A_CORE
"I feel good, no voices, the events that led to my hospitalization are still hazy"
"I feel good, can I get double portions?"
"I feel good, can I get double portions?"
"I feel pretty good, my parents are coming today."

## 2022-09-28 NOTE — BH CONSULTATION LIAISON PROGRESS NOTE - CURRENT MEDICATION
MEDICATIONS  (STANDING):  acetylcysteine 30 Gram(s),Dextrose 5% in Water 1000 milliLiter(s) 30 Gram(s) (100 mL/Hr) IV Continuous <Continuous>  cefTRIAXone Injectable. 1000 milliGRAM(s) IV Push every 24 hours  chlorhexidine 2% Cloths 1 Application(s) Topical <User Schedule>  enoxaparin Injectable 40 milliGRAM(s) SubCutaneous every 24 hours  folic acid 1 milliGRAM(s) Oral daily  multivitamin 1 Tablet(s) Oral daily  potassium phosphate / sodium phosphate Tablet (K-PHOS No. 2) 1 Tablet(s) Oral four times a day  sodium chloride 0.9%. 1000 milliLiter(s) (75 mL/Hr) IV Continuous <Continuous>  thiamine 100 milliGRAM(s) Oral daily    MEDICATIONS  (PRN):  LORazepam     Tablet 1 milliGRAM(s) Oral every 6 hours PRN Agitation  LORazepam   Injectable 2 milliGRAM(s) IntraMuscular every 6 hours PRN severe agitation  sodium chloride 0.9% lock flush 10 milliLiter(s) IV Push every 1 hour PRN Pre/post blood products, medications, blood draw, and to maintain line patency  
MEDICATIONS  (STANDING):  chlorhexidine 2% Cloths 1 Application(s) Topical <User Schedule>  enoxaparin Injectable 40 milliGRAM(s) SubCutaneous every 24 hours  folic acid 1 milliGRAM(s) Oral daily  multivitamin 1 Tablet(s) Oral daily  paliperidone ER. 3 milliGRAM(s) Oral daily  thiamine 100 milliGRAM(s) Oral daily    MEDICATIONS  (PRN):  LORazepam     Tablet 1 milliGRAM(s) Oral every 6 hours PRN Agitation  LORazepam   Injectable 2 milliGRAM(s) IntraMuscular every 6 hours PRN severe agitation  sodium chloride 0.9% lock flush 10 milliLiter(s) IV Push every 1 hour PRN Pre/post blood products, medications, blood draw, and to maintain line patency  
MEDICATIONS  (STANDING):  chlorhexidine 2% Cloths 1 Application(s) Topical <User Schedule>  enoxaparin Injectable 40 milliGRAM(s) SubCutaneous every 24 hours  folic acid 1 milliGRAM(s) Oral daily  multivitamin 1 Tablet(s) Oral daily  paliperidone ER. 3 milliGRAM(s) Oral daily  potassium chloride    Tablet ER 40 milliEquivalent(s) Oral daily  thiamine 100 milliGRAM(s) Oral daily    MEDICATIONS  (PRN):  LORazepam     Tablet 1 milliGRAM(s) Oral every 6 hours PRN Agitation  LORazepam   Injectable 2 milliGRAM(s) IntraMuscular every 6 hours PRN severe agitation  sodium chloride 0.9% lock flush 10 milliLiter(s) IV Push every 1 hour PRN Pre/post blood products, medications, blood draw, and to maintain line patency  
MEDICATIONS  (STANDING):  chlorhexidine 2% Cloths 1 Application(s) Topical <User Schedule>  enoxaparin Injectable 40 milliGRAM(s) SubCutaneous every 24 hours  folic acid 1 milliGRAM(s) Oral daily  multivitamin 1 Tablet(s) Oral daily  paliperidone ER. 3 milliGRAM(s) Oral daily  potassium chloride    Tablet ER 40 milliEquivalent(s) Oral daily  thiamine 100 milliGRAM(s) Oral daily    MEDICATIONS  (PRN):  LORazepam     Tablet 1 milliGRAM(s) Oral every 6 hours PRN Agitation  LORazepam   Injectable 2 milliGRAM(s) IntraMuscular every 6 hours PRN severe agitation  sodium chloride 0.9% lock flush 10 milliLiter(s) IV Push every 1 hour PRN Pre/post blood products, medications, blood draw, and to maintain line patency

## 2022-09-28 NOTE — BH CONSULTATION LIAISON PROGRESS NOTE - NSICDXBHPRIMARYDX_PSY_ALL_CORE
Schizoaffective disorder   F25.9  

## 2022-09-28 NOTE — DISCHARGE NOTE PROVIDER - NSDCMRMEDTOKEN_GEN_ALL_CORE_FT
benztropine 0.5 mg oral tablet: 1 tab(s) orally 2 times a day  clonazePAM 0.5 mg oral tablet: 1 tab(s) orally once a day, As Needed  FLUoxetine 10 mg oral capsule: 3 cap(s) orally once a day  gabapentin 300 mg oral capsule: 1 cap(s) orally 3 times a day  melatonin 3 mg oral tablet: 1 tab(s) orally once a day (at bedtime)  OLANZapine 10 mg oral tablet: 1 tab(s) orally once a day (at bedtime)   folic acid 1 mg oral tablet: 1 tab(s) orally once a day  Multiple Vitamins oral tablet: 1 tab(s) orally once a day  paliperidone 3 mg oral tablet, extended release: 1 tab(s) orally once a day   thiamine 100 mg oral tablet: 1 tab(s) orally once a day

## 2022-09-28 NOTE — DISCHARGE NOTE NURSING/CASE MANAGEMENT/SOCIAL WORK - PATIENT PORTAL LINK FT
You can access the FollowMyHealth Patient Portal offered by [facility] by registering at the following website: [email or web address]. By joining [facility]’s FollowMyHealth portal, you will also be able to view your health information using other applications (apps) compatible with our system.

## 2022-09-29 LAB
ANNOTATION COMMENT IMP: SIGNIFICANT CHANGE UP
HEV RNA SERPL NAA+PROBE-ACNC: SIGNIFICANT CHANGE UP IU/ML
HEV RNA SERPL NAA+PROBE-LOG IU: <3.3 LOG IU/ML — SIGNIFICANT CHANGE UP
SPECIMEN SOURCE: SIGNIFICANT CHANGE UP

## 2022-09-30 LAB — OLANZAPINE SERPL-MCNC: <5 NG/ML — LOW (ref 10–80)

## 2022-10-07 ENCOUNTER — EMERGENCY (EMERGENCY)
Facility: HOSPITAL | Age: 37
LOS: 1 days | Discharge: OTHER INSTITUTION | End: 2022-10-07
Attending: EMERGENCY MEDICINE
Payer: MEDICARE

## 2022-10-07 VITALS
HEART RATE: 82 BPM | DIASTOLIC BLOOD PRESSURE: 86 MMHG | RESPIRATION RATE: 16 BRPM | WEIGHT: 214.95 LBS | OXYGEN SATURATION: 99 % | TEMPERATURE: 98 F | SYSTOLIC BLOOD PRESSURE: 129 MMHG | HEIGHT: 68 IN

## 2022-10-07 VITALS
HEART RATE: 95 BPM | OXYGEN SATURATION: 98 % | RESPIRATION RATE: 20 BRPM | SYSTOLIC BLOOD PRESSURE: 151 MMHG | DIASTOLIC BLOOD PRESSURE: 92 MMHG | TEMPERATURE: 98 F

## 2022-10-07 DIAGNOSIS — Z90.49 ACQUIRED ABSENCE OF OTHER SPECIFIED PARTS OF DIGESTIVE TRACT: Chronic | ICD-10-CM

## 2022-10-07 DIAGNOSIS — F31.9 BIPOLAR DISORDER, UNSPECIFIED: ICD-10-CM

## 2022-10-07 PROBLEM — F19.10 OTHER PSYCHOACTIVE SUBSTANCE ABUSE, UNCOMPLICATED: Chronic | Status: ACTIVE | Noted: 2022-09-20

## 2022-10-07 LAB
AMPHET UR-MCNC: NEGATIVE — SIGNIFICANT CHANGE UP
ANION GAP SERPL CALC-SCNC: 12 MMOL/L — SIGNIFICANT CHANGE UP (ref 5–17)
APAP SERPL-MCNC: <3 UG/ML — LOW (ref 10–26)
APPEARANCE UR: CLEAR — SIGNIFICANT CHANGE UP
BARBITURATES UR SCN-MCNC: NEGATIVE — SIGNIFICANT CHANGE UP
BASOPHILS # BLD AUTO: 0.04 K/UL — SIGNIFICANT CHANGE UP (ref 0–0.2)
BASOPHILS NFR BLD AUTO: 0.6 % — SIGNIFICANT CHANGE UP (ref 0–2)
BENZODIAZ UR-MCNC: NEGATIVE — SIGNIFICANT CHANGE UP
BILIRUB UR-MCNC: NEGATIVE — SIGNIFICANT CHANGE UP
BUN SERPL-MCNC: 14.1 MG/DL — SIGNIFICANT CHANGE UP (ref 8–20)
CALCIUM SERPL-MCNC: 9.6 MG/DL — SIGNIFICANT CHANGE UP (ref 8.4–10.5)
CHLORIDE SERPL-SCNC: 100 MMOL/L — SIGNIFICANT CHANGE UP (ref 98–107)
CO2 SERPL-SCNC: 25 MMOL/L — SIGNIFICANT CHANGE UP (ref 22–29)
COCAINE METAB.OTHER UR-MCNC: NEGATIVE — SIGNIFICANT CHANGE UP
COLOR SPEC: YELLOW — SIGNIFICANT CHANGE UP
CREAT SERPL-MCNC: 0.84 MG/DL — SIGNIFICANT CHANGE UP (ref 0.5–1.3)
DIFF PNL FLD: NEGATIVE — SIGNIFICANT CHANGE UP
EGFR: 115 ML/MIN/1.73M2 — SIGNIFICANT CHANGE UP
EOSINOPHIL # BLD AUTO: 0.05 K/UL — SIGNIFICANT CHANGE UP (ref 0–0.5)
EOSINOPHIL NFR BLD AUTO: 0.8 % — SIGNIFICANT CHANGE UP (ref 0–6)
ETHANOL SERPL-MCNC: <10 MG/DL — SIGNIFICANT CHANGE UP (ref 0–9)
GLUCOSE SERPL-MCNC: 139 MG/DL — HIGH (ref 70–99)
GLUCOSE UR QL: NEGATIVE MG/DL — SIGNIFICANT CHANGE UP
HCT VFR BLD CALC: 39.4 % — SIGNIFICANT CHANGE UP (ref 39–50)
HGB BLD-MCNC: 13.7 G/DL — SIGNIFICANT CHANGE UP (ref 13–17)
IMM GRANULOCYTES NFR BLD AUTO: 0.3 % — SIGNIFICANT CHANGE UP (ref 0–0.9)
KETONES UR-MCNC: NEGATIVE — SIGNIFICANT CHANGE UP
LEUKOCYTE ESTERASE UR-ACNC: NEGATIVE — SIGNIFICANT CHANGE UP
LYMPHOCYTES # BLD AUTO: 1.38 K/UL — SIGNIFICANT CHANGE UP (ref 1–3.3)
LYMPHOCYTES # BLD AUTO: 21.9 % — SIGNIFICANT CHANGE UP (ref 13–44)
MCHC RBC-ENTMCNC: 28.5 PG — SIGNIFICANT CHANGE UP (ref 27–34)
MCHC RBC-ENTMCNC: 34.8 GM/DL — SIGNIFICANT CHANGE UP (ref 32–36)
MCV RBC AUTO: 81.9 FL — SIGNIFICANT CHANGE UP (ref 80–100)
METHADONE UR-MCNC: NEGATIVE — SIGNIFICANT CHANGE UP
MONOCYTES # BLD AUTO: 0.42 K/UL — SIGNIFICANT CHANGE UP (ref 0–0.9)
MONOCYTES NFR BLD AUTO: 6.7 % — SIGNIFICANT CHANGE UP (ref 2–14)
NEUTROPHILS # BLD AUTO: 4.4 K/UL — SIGNIFICANT CHANGE UP (ref 1.8–7.4)
NEUTROPHILS NFR BLD AUTO: 69.7 % — SIGNIFICANT CHANGE UP (ref 43–77)
NITRITE UR-MCNC: NEGATIVE — SIGNIFICANT CHANGE UP
OPIATES UR-MCNC: NEGATIVE — SIGNIFICANT CHANGE UP
PCP SPEC-MCNC: SIGNIFICANT CHANGE UP
PCP UR-MCNC: NEGATIVE — SIGNIFICANT CHANGE UP
PH UR: 6 — SIGNIFICANT CHANGE UP (ref 5–8)
PLATELET # BLD AUTO: 487 K/UL — HIGH (ref 150–400)
POTASSIUM SERPL-MCNC: 4.1 MMOL/L — SIGNIFICANT CHANGE UP (ref 3.5–5.3)
POTASSIUM SERPL-SCNC: 4.1 MMOL/L — SIGNIFICANT CHANGE UP (ref 3.5–5.3)
PROT UR-MCNC: NEGATIVE — SIGNIFICANT CHANGE UP
RBC # BLD: 4.81 M/UL — SIGNIFICANT CHANGE UP (ref 4.2–5.8)
RBC # FLD: 13.3 % — SIGNIFICANT CHANGE UP (ref 10.3–14.5)
SALICYLATES SERPL-MCNC: <0.6 MG/DL — LOW (ref 10–20)
SARS-COV-2 RNA SPEC QL NAA+PROBE: SIGNIFICANT CHANGE UP
SODIUM SERPL-SCNC: 137 MMOL/L — SIGNIFICANT CHANGE UP (ref 135–145)
SP GR SPEC: 1.02 — SIGNIFICANT CHANGE UP (ref 1.01–1.02)
THC UR QL: POSITIVE
UROBILINOGEN FLD QL: NEGATIVE MG/DL — SIGNIFICANT CHANGE UP
WBC # BLD: 6.31 K/UL — SIGNIFICANT CHANGE UP (ref 3.8–10.5)
WBC # FLD AUTO: 6.31 K/UL — SIGNIFICANT CHANGE UP (ref 3.8–10.5)

## 2022-10-07 PROCEDURE — U0003: CPT

## 2022-10-07 PROCEDURE — 85025 COMPLETE CBC W/AUTO DIFF WBC: CPT

## 2022-10-07 PROCEDURE — 93005 ELECTROCARDIOGRAM TRACING: CPT

## 2022-10-07 PROCEDURE — 99218: CPT

## 2022-10-07 PROCEDURE — 80048 BASIC METABOLIC PNL TOTAL CA: CPT

## 2022-10-07 PROCEDURE — 93010 ELECTROCARDIOGRAM REPORT: CPT

## 2022-10-07 PROCEDURE — 90792 PSYCH DIAG EVAL W/MED SRVCS: CPT

## 2022-10-07 PROCEDURE — U0005: CPT

## 2022-10-07 PROCEDURE — G0378: CPT

## 2022-10-07 PROCEDURE — 81003 URINALYSIS AUTO W/O SCOPE: CPT

## 2022-10-07 PROCEDURE — 99285 EMERGENCY DEPT VISIT HI MDM: CPT

## 2022-10-07 PROCEDURE — 36415 COLL VENOUS BLD VENIPUNCTURE: CPT

## 2022-10-07 PROCEDURE — 80307 DRUG TEST PRSMV CHEM ANLYZR: CPT

## 2022-10-07 RX ORDER — CLONAZEPAM 1 MG
1 TABLET ORAL ONCE
Refills: 0 | Status: DISCONTINUED | OUTPATIENT
Start: 2022-10-07 | End: 2022-10-07

## 2022-10-07 RX ORDER — HALOPERIDOL DECANOATE 100 MG/ML
5 INJECTION INTRAMUSCULAR EVERY 6 HOURS
Refills: 0 | Status: DISCONTINUED | OUTPATIENT
Start: 2022-10-07 | End: 2022-10-11

## 2022-10-07 RX ORDER — DIPHENHYDRAMINE HCL 50 MG
50 CAPSULE ORAL EVERY 6 HOURS
Refills: 0 | Status: DISCONTINUED | OUTPATIENT
Start: 2022-10-07 | End: 2022-10-11

## 2022-10-07 RX ADMIN — Medication 1 MILLIGRAM(S): at 22:09

## 2022-10-07 RX ADMIN — Medication 1 MILLIGRAM(S): at 17:22

## 2022-10-07 NOTE — ED ADULT NURSE REASSESSMENT NOTE - NS ED NURSE REASSESS COMMENT FT1
Clonopin given as ordered by NP, patient tolerated. Patient admitted at [facility] for in patient treatment, left facility in stable condition escorted by EMT from [facility]. All belongings returned to patient.
Endorsed patient care in the community area. Patient pacing the area, talking to self, no agitation, respond to questions but preoccupied. Patient had no attempt to harm self or other, safety maintained.
Airway patent, Nasal mucosa clear. Mouth with normal mucosa. Throat has no vesicles, no oropharyngeal exudates and uvula is midline.

## 2022-10-07 NOTE — ED BEHAVIORAL HEALTH ASSESSMENT NOTE - ADDITIONAL DETAILS ALL
4 prior SA, bu cutting b/l wrists and dorsal aspect of left arm and by od on aspirin, and most recently OD Tylenol admitted to ICU and recently discharged from [facility] medical floor

## 2022-10-07 NOTE — ED BEHAVIORAL HEALTH ASSESSMENT NOTE - OTHER
pacing denies, mumbling to himself, talking to himself all night while pacing his room patient currently sedated pending

## 2022-10-07 NOTE — ED BEHAVIORAL HEALTH ASSESSMENT NOTE - SUMMARY
Patient is a 37 YOHM, non-caregiver, resides with parents, with psych hx since age 13, h/o PTSD, in the past diagnosed with bipolar with psychotic features, with h/o  multiple admissions (6) and 4 prior SA, bu cutting b/l wrists and dorsal aspect of left arm and by od on aspirin, and most recently OD Tylenol admitted to ICU and recently discharged from [facility] medical floor while being treated by C/L psych team, bib EMS active by father for  paranoia and possible suicidal ideation as father found 5 bottles of Aleve in his room, recently purchased as father discards pills he finds after Pt leaves the house.            Pt came to  area, notably upset, pacing with multiple attempts to convey his concerns about father "lying" about him and that he wants to be discharged.   Father went down to check on him and Pt got in his face and said, "Get out, unless you want to see me kill myself".  Father reports he found 5 new bottles of Aleve in his room claiming he checks all the time for fear he will find him dead in basement (his room).   Pt is currently psychotic, paranoid with suicidal statements made recent purchase of 5 bottles of Aleve.  Pt requires involuntary psych admission for transfer once bed available.

## 2022-10-07 NOTE — ED PROVIDER NOTE - PROGRESS NOTE DETAILS
labs ordered, sent to , no concern for underlying medical concern at this time. no indication of overdose attempt but father is concerned that he would have tried to hurt himself with the Aleve bottles

## 2022-10-07 NOTE — ED BEHAVIORAL HEALTH ASSESSMENT NOTE - DESCRIPTION
T(C): 36.4 (10-07-22 @ 10:40), Max: 36.4 (10-07-22 @ 10:40)  T(F): 97.6 (10-07-22 @ 10:40), Max: 97.6 (10-07-22 @ 10:40)  HR: 82 (10-07-22 @ 10:40) (82 - 82)  BP: 129/86 (10-07-22 @ 10:40) (129/86 - 129/86)  RR: 16 (10-07-22 @ 10:40) (16 - 16)  SpO2: 99% (10-07-22 @ 10:40) (99% - 99%) resides with parents denies

## 2022-10-07 NOTE — CHART NOTE - NSCHARTNOTEFT_GEN_A_CORE
SW Note: Per  Staff, recc for pt is inpatient psychiatric services on a 9.37 basis. Worker contacted [place] admissions ([facility]) who reports that a bed is available, pending review of pts case. Worker faxed over all required clinicals for review ([phone]). Worker awaiting response from [facility].

## 2022-10-07 NOTE — ED BEHAVIORAL HEALTH ASSESSMENT NOTE - RISK ASSESSMENT
RF multiple past suicide attempt, recent suicide attempt by OD Tylenol, psychosis  PF supportive family High Acute Suicide Risk
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
## 2022-10-07 NOTE — ED ADULT TRIAGE NOTE - CHIEF COMPLAINT QUOTE
Pt with h/o schizophrenia BIBA for hearing voices and making suicidal comments as per patients father at home. Pt is agitated and manic in triage. Denies any drugs or alcohol.
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
## 2022-10-07 NOTE — ED CDU PROVIDER INITIAL DAY NOTE - OBJECTIVE STATEMENT
36 yo M with pmh bipolar with psychotic features on palperidone, prior SI attempts, recent MICU admission for tylenol overdose presents by father for concern for suicidal statements and auditory hallucinations. Patient denies SI/HI/any hallucinations. Also reports no alcohol, illicit drugs or tobacco use. Patient is reluctant to answer questions in triage. Spoke with father (emergency contact) who states yesterday patient  was saying "theyre trying to kill me" and pacing the house. Father tried to bring him to [facility] at that time but patient refused and jumped out of the car. father states that today he was "violent" and "loud" and stating he would "kill himself" Father states he found 3-4 completely full bottles of Aleve. no open bottles or empty bottles indicative of current SI attempt.

## 2022-10-07 NOTE — ED ADULT NURSE NOTE - SUICIDE RISK FACTORS
Agitation/Severe Anxiety/Panic/Access to lethal methods (pills, firearm, etc.: Ask specifically about presence or absence of a firearm in the home or ease of accessing/Impulsivity

## 2022-10-07 NOTE — ED BEHAVIORAL HEALTH ASSESSMENT NOTE - OTHER PAST PSYCHIATRIC HISTORY (INCLUDE DETAILS REGARDING ONSET, COURSE OF ILLNESS, INPATIENT/OUTPATIENT TREATMENT)
Multiple prior psychiatric hospitalizations, CPEP visits, at  3 SA: wrist  cutting and OD ASA and most recently Tylenol OD  Attend [facility] refused last visit

## 2022-10-07 NOTE — ED PROVIDER NOTE - PHYSICAL EXAMINATION
VS noted, patient intermittently agitated, intermittently with tardive movements of the mouth/face, HR regular, resp even and nonlabored. Saturating well on RA.

## 2022-10-07 NOTE — ED PROVIDER NOTE - OBJECTIVE STATEMENT
38 yo M with pmh bipolar with psychotic features on palperidone, prior SI attempts, recent MICU admission for tylenol overdose presents by father for concern for suicidal statements and auditory hallucinations. Patient denies SI/HI/any hallucinations. Also reports no alcohol, illicit drugs or tobacco use. Patient is reluctant to answer questions in triage. Spoke with father (emergency contact) who states yesterday patient  was saying "theyre trying to kill me" and pacing the house. Father tried to bring him to [facility] at that time but patient refused and jumped out of the car. father states that today he was "violent" and "loud" and stating he would "kill himself" Father states he found 3-4 completely full bottles of Aleve. no open bottles or empty bottles indicative of current SI attempt.

## 2022-10-07 NOTE — ED ADULT NURSE NOTE - HPI (INCLUDE ILLNESS QUALITY, SEVERITY, DURATION, TIMING, CONTEXT, MODIFYING FACTORS, ASSOCIATED SIGNS AND SYMPTOMS)
Patient is a 38 y/o male who lives at home with his parents. Comes to  after being medically cleared in main ED. Was recently discharged from [facility] [place] after recovering from an attempted suicide by acetaminophen overdose. Patient's father reports patient has been increasingly paranoid since d/c. Father stated patient wants TV's in the house turned off because "they are watching me and listening". Patient reportedly also brought five bottles of OTC medications containing acetaminophen, and is concerned may try to take his own life again. Patient presents guarded and suspicious, states he just wants to go home. He denied any suicidal thoughts, but easily agitated. Stated he is taking his medication and "not on drugs or drinking". Patient UTOX is positive for THC, and patient asked for printout because "that's not possible". Patient expressed belief staff somehow manipulated the results to make test positive. Patient deemed appropriate for inpatient transfer due to paranoia and is a danger to self/others. Patient informed, and has been pacing, but has been in good behavioral control so far.
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
## 2023-04-19 NOTE — PATIENT PROFILE ADULT - DO YOU FEEL UNSAFE AT WORK?
Patient had just gotten off the phone with pre-admit, all questions were answered. No other needs at this time.   not applicable

## 2023-04-19 NOTE — PROGRESS NOTE BEHAVIORAL HEALTH - NS ED BHA REVIEW OF ED CHART AVAILABLE LABS REVIEWED
[id number] today and td today if pharmacy is covered   TD is not covered and patient would have to pay $35. TD not given.       
Yes

## 2024-11-01 NOTE — ED PROVIDER NOTE - CHPI ED SYMPTOMS POS
alcohol intoxication, huffing paint patient to tolerate diet as advanced and meet estimated needs as diet advanced